# Patient Record
Sex: FEMALE | Race: WHITE | NOT HISPANIC OR LATINO | Employment: OTHER | URBAN - NONMETROPOLITAN AREA
[De-identification: names, ages, dates, MRNs, and addresses within clinical notes are randomized per-mention and may not be internally consistent; named-entity substitution may affect disease eponyms.]

---

## 2022-02-14 ENCOUNTER — TRANSFERRED RECORDS (OUTPATIENT)
Dept: HEALTH INFORMATION MANAGEMENT | Facility: HOSPITAL | Age: 77
End: 2022-02-14

## 2022-02-14 LAB — ALBUMIN/CREATININE RATIO: 18.1 UG/MG (ref 0–29.9)

## 2023-09-26 ENCOUNTER — OFFICE VISIT (OUTPATIENT)
Dept: FAMILY MEDICINE | Facility: OTHER | Age: 78
End: 2023-09-26
Attending: FAMILY MEDICINE
Payer: COMMERCIAL

## 2023-09-26 VITALS
TEMPERATURE: 98.2 F | HEART RATE: 85 BPM | WEIGHT: 174.06 LBS | SYSTOLIC BLOOD PRESSURE: 91 MMHG | OXYGEN SATURATION: 97 % | DIASTOLIC BLOOD PRESSURE: 54 MMHG

## 2023-09-26 DIAGNOSIS — N18.31 STAGE 3A CHRONIC KIDNEY DISEASE (H): ICD-10-CM

## 2023-09-26 DIAGNOSIS — R46.89 COGNITIVE AND BEHAVIORAL CHANGES: ICD-10-CM

## 2023-09-26 DIAGNOSIS — M10.9 GOUT OF MULTIPLE SITES, UNSPECIFIED CAUSE, UNSPECIFIED CHRONICITY: ICD-10-CM

## 2023-09-26 DIAGNOSIS — Z76.89 ESTABLISHING CARE WITH NEW DOCTOR, ENCOUNTER FOR: Primary | ICD-10-CM

## 2023-09-26 DIAGNOSIS — M51.369 DDD (DEGENERATIVE DISC DISEASE), LUMBAR: ICD-10-CM

## 2023-09-26 DIAGNOSIS — Z12.31 ENCOUNTER FOR SCREENING MAMMOGRAM FOR BREAST CANCER: ICD-10-CM

## 2023-09-26 DIAGNOSIS — M17.0 PRIMARY OSTEOARTHRITIS OF BOTH KNEES: ICD-10-CM

## 2023-09-26 DIAGNOSIS — E78.2 MIXED HYPERLIPIDEMIA: ICD-10-CM

## 2023-09-26 DIAGNOSIS — N76.4 VULVAR ABSCESS: ICD-10-CM

## 2023-09-26 DIAGNOSIS — R41.89 COGNITIVE AND BEHAVIORAL CHANGES: ICD-10-CM

## 2023-09-26 DIAGNOSIS — I50.22 CHRONIC SYSTOLIC CONGESTIVE HEART FAILURE (H): ICD-10-CM

## 2023-09-26 LAB
ALBUMIN SERPL BCG-MCNC: 4.3 G/DL (ref 3.5–5.2)
ALP SERPL-CCNC: 110 U/L (ref 35–104)
ALT SERPL W P-5'-P-CCNC: <5 U/L (ref 0–50)
ANION GAP SERPL CALCULATED.3IONS-SCNC: 14 MMOL/L (ref 7–15)
AST SERPL W P-5'-P-CCNC: 14 U/L (ref 0–45)
BASOPHILS # BLD AUTO: 0.1 10E3/UL (ref 0–0.2)
BASOPHILS NFR BLD AUTO: 1 %
BILIRUB SERPL-MCNC: 0.7 MG/DL
BUN SERPL-MCNC: 39.1 MG/DL (ref 8–23)
CALCIUM SERPL-MCNC: 9.4 MG/DL (ref 8.8–10.2)
CHLORIDE SERPL-SCNC: 103 MMOL/L (ref 98–107)
CREAT SERPL-MCNC: 1.38 MG/DL (ref 0.51–0.95)
DEPRECATED HCO3 PLAS-SCNC: 23 MMOL/L (ref 22–29)
EGFRCR SERPLBLD CKD-EPI 2021: 40 ML/MIN/1.73M2
EOSINOPHIL # BLD AUTO: 0.2 10E3/UL (ref 0–0.7)
EOSINOPHIL NFR BLD AUTO: 2 %
ERYTHROCYTE [DISTWIDTH] IN BLOOD BY AUTOMATED COUNT: 13.5 % (ref 10–15)
GLUCOSE SERPL-MCNC: 106 MG/DL (ref 70–99)
HCT VFR BLD AUTO: 36.9 % (ref 35–47)
HGB BLD-MCNC: 11.9 G/DL (ref 11.7–15.7)
IMM GRANULOCYTES # BLD: 0 10E3/UL
IMM GRANULOCYTES NFR BLD: 0 %
LYMPHOCYTES # BLD AUTO: 2.1 10E3/UL (ref 0.8–5.3)
LYMPHOCYTES NFR BLD AUTO: 23 %
MCH RBC QN AUTO: 32.8 PG (ref 26.5–33)
MCHC RBC AUTO-ENTMCNC: 32.2 G/DL (ref 31.5–36.5)
MCV RBC AUTO: 102 FL (ref 78–100)
MONOCYTES # BLD AUTO: 0.7 10E3/UL (ref 0–1.3)
MONOCYTES NFR BLD AUTO: 8 %
NEUTROPHILS # BLD AUTO: 6.3 10E3/UL (ref 1.6–8.3)
NEUTROPHILS NFR BLD AUTO: 66 %
NRBC # BLD AUTO: 0 10E3/UL
NRBC BLD AUTO-RTO: 0 /100
PLATELET # BLD AUTO: 349 10E3/UL (ref 150–450)
POTASSIUM SERPL-SCNC: 4.8 MMOL/L (ref 3.4–5.3)
PROT SERPL-MCNC: 7.6 G/DL (ref 6.4–8.3)
RBC # BLD AUTO: 3.63 10E6/UL (ref 3.8–5.2)
SODIUM SERPL-SCNC: 140 MMOL/L (ref 135–145)
TSH SERPL DL<=0.005 MIU/L-ACNC: 2.49 UIU/ML (ref 0.3–4.2)
WBC # BLD AUTO: 9.4 10E3/UL (ref 4–11)

## 2023-09-26 PROCEDURE — 82746 ASSAY OF FOLIC ACID SERUM: CPT | Mod: ZL | Performed by: FAMILY MEDICINE

## 2023-09-26 PROCEDURE — 82607 VITAMIN B-12: CPT | Mod: ZL | Performed by: FAMILY MEDICINE

## 2023-09-26 PROCEDURE — 2894A PR VOIDCORRECT: CPT | Performed by: FAMILY MEDICINE

## 2023-09-26 PROCEDURE — 90662 IIV NO PRSV INCREASED AG IM: CPT | Performed by: FAMILY MEDICINE

## 2023-09-26 PROCEDURE — 85025 COMPLETE CBC W/AUTO DIFF WBC: CPT | Mod: ZL | Performed by: FAMILY MEDICINE

## 2023-09-26 PROCEDURE — 84443 ASSAY THYROID STIM HORMONE: CPT | Mod: ZL | Performed by: FAMILY MEDICINE

## 2023-09-26 PROCEDURE — G0008 ADMIN INFLUENZA VIRUS VAC: HCPCS | Performed by: FAMILY MEDICINE

## 2023-09-26 PROCEDURE — 80053 COMPREHEN METABOLIC PANEL: CPT | Mod: ZL | Performed by: FAMILY MEDICINE

## 2023-09-26 PROCEDURE — 36415 COLL VENOUS BLD VENIPUNCTURE: CPT | Mod: ZL | Performed by: FAMILY MEDICINE

## 2023-09-26 PROCEDURE — G0463 HOSPITAL OUTPT CLINIC VISIT: HCPCS | Mod: 25 | Performed by: FAMILY MEDICINE

## 2023-09-26 PROCEDURE — 99204 OFFICE O/P NEW MOD 45 MIN: CPT | Mod: 25 | Performed by: FAMILY MEDICINE

## 2023-09-26 RX ORDER — CARVEDILOL 6.25 MG/1
3.12 TABLET ORAL
COMMUNITY
Start: 2023-08-29 | End: 2023-09-26

## 2023-09-26 RX ORDER — COLCHICINE 0.6 MG/1
TABLET ORAL
Qty: 15 TABLET | Refills: 0 | COMMUNITY
Start: 2023-09-26 | End: 2023-09-26

## 2023-09-26 RX ORDER — CARVEDILOL 6.25 MG/1
3.12 TABLET ORAL 2 TIMES DAILY WITH MEALS
Qty: 90 TABLET | Refills: 3 | COMMUNITY
Start: 2023-09-26 | End: 2023-09-26

## 2023-09-26 RX ORDER — FUROSEMIDE 20 MG
20 TABLET ORAL DAILY
Qty: 90 TABLET | Refills: 3 | Status: SHIPPED | OUTPATIENT
Start: 2023-09-26 | End: 2024-06-17

## 2023-09-26 RX ORDER — LISINOPRIL 10 MG/1
10 TABLET ORAL AT BEDTIME
COMMUNITY
Start: 2023-08-29 | End: 2023-09-26

## 2023-09-26 RX ORDER — LISINOPRIL 10 MG/1
10 TABLET ORAL DAILY
Qty: 90 TABLET | Refills: 3 | Status: ON HOLD | OUTPATIENT
Start: 2023-09-26 | End: 2023-10-04

## 2023-09-26 RX ORDER — PREDNISONE 10 MG/1
TABLET ORAL
Qty: 28 TABLET | Refills: 0 | COMMUNITY
Start: 2023-09-26 | End: 2023-09-26

## 2023-09-26 RX ORDER — LIDOCAINE 50 MG/G
OINTMENT TOPICAL
COMMUNITY
Start: 2023-08-07 | End: 2023-09-26

## 2023-09-26 RX ORDER — CARVEDILOL 6.25 MG/1
3.12 TABLET ORAL 2 TIMES DAILY WITH MEALS
Qty: 90 TABLET | Refills: 3 | Status: SHIPPED | OUTPATIENT
Start: 2023-09-26 | End: 2024-06-17

## 2023-09-26 RX ORDER — PREDNISONE 10 MG/1
TABLET ORAL
Qty: 28 TABLET | Refills: 1 | Status: SHIPPED | OUTPATIENT
Start: 2023-09-26 | End: 2024-04-17

## 2023-09-26 RX ORDER — FUROSEMIDE 20 MG
20 TABLET ORAL DAILY
COMMUNITY
Start: 2023-06-09 | End: 2023-09-26

## 2023-09-26 RX ORDER — COLCHICINE 0.6 MG/1
TABLET ORAL
Qty: 15 TABLET | Refills: 1 | Status: SHIPPED | OUTPATIENT
Start: 2023-09-26 | End: 2024-05-21

## 2023-09-26 RX ORDER — ACETAMINOPHEN 500 MG
500 TABLET ORAL EVERY 8 HOURS PRN
Status: ON HOLD | COMMUNITY
Start: 2022-09-01 | End: 2023-10-15

## 2023-09-26 RX ORDER — ASPIRIN 81 MG/1
81 TABLET ORAL DAILY
COMMUNITY
Start: 2023-03-03 | End: 2023-10-13

## 2023-09-26 ASSESSMENT — PAIN SCALES - GENERAL: PAINLEVEL: NO PAIN (0)

## 2023-09-26 NOTE — PATIENT INSTRUCTIONS
Results for orders placed or performed in visit on 09/26/23   Comprehensive metabolic panel     Status: Abnormal   Result Value Ref Range    Sodium 140 135 - 145 mmol/L    Potassium 4.8 3.4 - 5.3 mmol/L    Carbon Dioxide (CO2) 23 22 - 29 mmol/L    Anion Gap 14 7 - 15 mmol/L    Urea Nitrogen 39.1 (H) 8.0 - 23.0 mg/dL    Creatinine 1.38 (H) 0.51 - 0.95 mg/dL    GFR Estimate 40 (L) >60 mL/min/1.73m2    Calcium 9.4 8.8 - 10.2 mg/dL    Chloride 103 98 - 107 mmol/L    Glucose 106 (H) 70 - 99 mg/dL    Alkaline Phosphatase 110 (H) 35 - 104 U/L    AST 14 0 - 45 U/L    ALT <5 0 - 50 U/L    Protein Total 7.6 6.4 - 8.3 g/dL    Albumin 4.3 3.5 - 5.2 g/dL    Bilirubin Total 0.7 <=1.2 mg/dL   CBC with platelets and differential     Status: Abnormal   Result Value Ref Range    WBC Count 9.4 4.0 - 11.0 10e3/uL    RBC Count 3.63 (L) 3.80 - 5.20 10e6/uL    Hemoglobin 11.9 11.7 - 15.7 g/dL    Hematocrit 36.9 35.0 - 47.0 %     (H) 78 - 100 fL    MCH 32.8 26.5 - 33.0 pg    MCHC 32.2 31.5 - 36.5 g/dL    RDW 13.5 10.0 - 15.0 %    Platelet Count 349 150 - 450 10e3/uL    % Neutrophils 66 %    % Lymphocytes 23 %    % Monocytes 8 %    % Eosinophils 2 %    % Basophils 1 %    % Immature Granulocytes 0 %    NRBCs per 100 WBC 0 <1 /100    Absolute Neutrophils 6.3 1.6 - 8.3 10e3/uL    Absolute Lymphocytes 2.1 0.8 - 5.3 10e3/uL    Absolute Monocytes 0.7 0.0 - 1.3 10e3/uL    Absolute Eosinophils 0.2 0.0 - 0.7 10e3/uL    Absolute Basophils 0.1 0.0 - 0.2 10e3/uL    Absolute Immature Granulocytes 0.0 <=0.4 10e3/uL    Absolute NRBCs 0.0 10e3/uL   TSH     Status: Normal   Result Value Ref Range    TSH 2.49 0.30 - 4.20 uIU/mL   CBC with Platelets & Differential     Status: Abnormal    Narrative    The following orders were created for panel order CBC with Platelets & Differential.  Procedure                               Abnormality         Status                     ---------                               -----------         ------                      CBC with platelets and d...[[id number]]  Abnormal            Final result                 Please view results for these tests on the individual orders.

## 2023-09-26 NOTE — PROGRESS NOTES
Assessment & Plan     Establishing care with new doctor, encounter for  Will assume care   - CBC with Platelets & Differential; Future  - Comprehensive metabolic panel; Future  - LABELS; Future  - Folate; Future  - Vitamin B12; Future  - LABELS  - CBC with Platelets & Differential  - Comprehensive metabolic panel  - Folate  - Vitamin B12    Vulvar abscess  Resolved. No s/s of malignancy that can find in her chart   - CBC with Platelets & Differential; Future  - Comprehensive metabolic panel; Future  - LABELS; Future  - Folate; Future  - Vitamin B12; Future  - LABELS  - CBC with Platelets & Differential  - Comprehensive metabolic panel  - Folate  - Vitamin B12    Cognitive and behavioral changes  Mild changes per dtr.  Some hoarding and some different behaviors new to her-- keeping lots of cash on her etc.  Labs ordered and will monitor.   - CBC with Platelets & Differential; Future  - Comprehensive metabolic panel; Future  - LABELS; Future  - Folate; Future  - Vitamin B12; Future  - LABELS  - CBC with Platelets & Differential  - Comprehensive metabolic panel  - Folate  - Vitamin B12  - TSH  - furosemide (LASIX) 20 MG tablet; Take 1 tablet (20 mg) by mouth daily  - lisinopril (ZESTRIL) 10 MG tablet; Take 1 tablet (10 mg) by mouth daily  - carvedilol (COREG) 6.25 MG tablet; Take 0.5 tablets (3.125 mg) by mouth 2 times daily (with meals)    Chronic systolic congestive heart failure (H)  Well compensated with meds - Pt has been instructed on behavioral changes to improve CHF including fluid and salt restrictions along with watching weights and elevating legs. Pt is aware of when to come back or call  if signs and symptoms of CHF worsen.    Stage 3a chronic kidney disease (H)  Stable - will watch     Gout of multiple sites, unspecified cause, unspecified chronicity  Rare - PRN meds given   - predniSONE (DELTASONE) 10 MG tablet; 4 tabs orally for 4 days then 2 tabs orally for 4 days then 1 tablet orally for 4 days  -  colchicine (COLCRYS) 0.6 MG tablet; 2 tablets at onset of gout and may repeat in 1 hour to improve gout at onset and may take 1 tablet daily for 5 more days if gout persists    Mixed hyperlipidemia  Stable in past. Recheck in future     Primary osteoarthritis of both knees  Needs handicap sticker for this and her CHF- done     DDD (degenerative disc disease), lumbar  As aboe. Stable     Refusal of blood transfusions as patient is Mu-ism  Will honor     Encounter for screening mammogram for breast cancer  Due - ordered mammogram  - MA Screening Digital Bilateral; Future      Flu shot given     Review of external notes as documented elsewhere in note  Assessment requiring an independent historian(s) - family - dtr   Diagnosis or treatment significantly limited by social determinants of health - complex hx/ new pt / updated chart via looking at Care Every[facility]  Ordering of each unique test  Prescription drug management  45 minutes spent by me on the date of the encounter doing chart review, history and exam, documentation and further activities per the note           No follow-ups on file.    Nikita Hutchinson MD  [facility] - DEVIN Agarwal is a 77 year old, presenting for the following health issues:  Establish Care        9/26/2023    12:50 PM   Additional Questions   Roomed by Aleah Harry   Accompanied by Daughter - Amanda         9/26/2023    12:50 PM   Patient Reported Additional Medications   Patient reports taking the following new medications None       HPI     **Patient is wanting flu shot, and needs Disability parking form filled out     Establish Care -- pt moved from [place] with dtr to live with r here  [facility] reviewed  Just had Vulvar abscess drained    Hypertension Follow-up    Do you check your blood pressure regularly outside of the clinic? Yes   Are you following a low salt diet? Yes  Are your blood pressures ever more than 140 on the top  number (systolic) OR more   than 90 on the bottom number (diastolic), for example 140/90? No    Heart Failure Follow-up  Are you experiencing any shortness of breath? No  Are you experiencing any swelling in your legs or feet?  Stable  Are you using more pillows than usual? No  Do you cough at night?  No  Do you check your weight daily?  No  Have you had a weight change recently?  No  Are you having any of the following side effects from your medications? (Select all that apply)    Since your last visit, how many times have you gone to the cardiologist, urgent care, emergency room, or hospital because of your heart failure?     Last Echo: No results found. CARE EVERY WHERE EF around 40%      Chronic Kidney Disease Follow-up    Do you take any over the counter pain medicine?: No      Review of Systems   Constitutional, HEENT, cardiovascular, pulmonary, GI, , musculoskeletal, neuro, skin, endocrine and psych systems are negative, except as otherwise noted.      Objective    BP 91/54 (BP Location: Right arm, Patient Position: Sitting, Cuff Size: Adult Regular)   Pulse 85   Temp 98.2  F (36.8  C) (Tympanic)   Wt 79 kg (174 lb 1 oz)   SpO2 97%   There is no height or weight on file to calculate BMI.  Physical Exam   GENERAL: healthy, alert and no distress  EYES: Eyes grossly normal to inspection, PERRL and conjunctivae and sclerae normal  HENT: ear canals and TM's normal, nose and mouth without ulcers or lesions  NECK: no adenopathy, no asymmetry, masses, or scars and thyroid normal to palpation  RESP: lungs clear to auscultation - no rales, rhonchi or wheezes  BREAST: normal without masses, tenderness or nipple discharge and no palpable axillary masses or adenopathy  CV: regular rate and rhythm, normal S1 S2, no S3 or S4, no murmur, click or rub, no peripheral edema and peripheral pulses strong  ABDOMEN: soft, nontender, no hepatosplenomegaly, no masses and bowel sounds normal   (female): normal female external  genitalia, normal urethral meatus, vaginal mucosa pink,hemorrhoidal extra skin noted   MS: no gross musculoskeletal defects noted, plus 1 edema  SKIN: no suspicious lesions or rashes  NEURO: Normal strength and tone, mentation intact and speech normal  PSYCH: mentation appears normal, affect normal/bright        Results for orders placed or performed in visit on 09/26/23   Comprehensive metabolic panel     Status: Abnormal   Result Value Ref Range    Sodium 140 135 - 145 mmol/L    Potassium 4.8 3.4 - 5.3 mmol/L    Carbon Dioxide (CO2) 23 22 - 29 mmol/L    Anion Gap 14 7 - 15 mmol/L    Urea Nitrogen 39.1 (H) 8.0 - 23.0 mg/dL    Creatinine 1.38 (H) 0.51 - 0.95 mg/dL    GFR Estimate 40 (L) >60 mL/min/1.73m2    Calcium 9.4 8.8 - 10.2 mg/dL    Chloride 103 98 - 107 mmol/L    Glucose 106 (H) 70 - 99 mg/dL    Alkaline Phosphatase 110 (H) 35 - 104 U/L    AST 14 0 - 45 U/L    ALT <5 0 - 50 U/L    Protein Total 7.6 6.4 - 8.3 g/dL    Albumin 4.3 3.5 - 5.2 g/dL    Bilirubin Total 0.7 <=1.2 mg/dL   CBC with platelets and differential     Status: Abnormal   Result Value Ref Range    WBC Count 9.4 4.0 - 11.0 10e3/uL    RBC Count 3.63 (L) 3.80 - 5.20 10e6/uL    Hemoglobin 11.9 11.7 - 15.7 g/dL    Hematocrit 36.9 35.0 - 47.0 %     (H) 78 - 100 fL    MCH 32.8 26.5 - 33.0 pg    MCHC 32.2 31.5 - 36.5 g/dL    RDW 13.5 10.0 - 15.0 %    Platelet Count 349 150 - 450 10e3/uL    % Neutrophils 66 %    % Lymphocytes 23 %    % Monocytes 8 %    % Eosinophils 2 %    % Basophils 1 %    % Immature Granulocytes 0 %    NRBCs per 100 WBC 0 <1 /100    Absolute Neutrophils 6.3 1.6 - 8.3 10e3/uL    Absolute Lymphocytes 2.1 0.8 - 5.3 10e3/uL    Absolute Monocytes 0.7 0.0 - 1.3 10e3/uL    Absolute Eosinophils 0.2 0.0 - 0.7 10e3/uL    Absolute Basophils 0.1 0.0 - 0.2 10e3/uL    Absolute Immature Granulocytes 0.0 <=0.4 10e3/uL    Absolute NRBCs 0.0 10e3/uL   TSH     Status: Normal   Result Value Ref Range    TSH 2.49 0.30 - 4.20 uIU/mL   CBC with  Platelets & Differential     Status: Abnormal    Narrative    The following orders were created for panel order CBC with Platelets & Differential.  Procedure                               Abnormality         Status                     ---------                               -----------         ------                     CBC with platelets and d...[[id number]]  Abnormal            Final result                 Please view results for these tests on the individual orders.

## 2023-09-27 LAB
FOLATE SERPL-MCNC: 7.1 NG/ML (ref 4.6–34.8)
VIT B12 SERPL-MCNC: 352 PG/ML (ref 232–1245)

## 2023-10-02 ENCOUNTER — TELEPHONE (OUTPATIENT)
Dept: FAMILY MEDICINE | Facility: OTHER | Age: 78
End: 2023-10-02

## 2023-10-02 ENCOUNTER — ANESTHESIA (OUTPATIENT)
Dept: MEDSURG UNIT | Facility: HOSPITAL | Age: 78
DRG: 854 | End: 2023-10-02
Payer: MEDICARE

## 2023-10-02 ENCOUNTER — HOSPITAL ENCOUNTER (INPATIENT)
Facility: HOSPITAL | Age: 78
LOS: 3 days | Discharge: HOME | DRG: 854 | End: 2023-10-05
Attending: NURSE PRACTITIONER | Admitting: SURGERY
Payer: MEDICARE

## 2023-10-02 ENCOUNTER — ANESTHESIA EVENT (OUTPATIENT)
Dept: MEDSURG UNIT | Facility: HOSPITAL | Age: 78
DRG: 854 | End: 2023-10-02
Payer: MEDICARE

## 2023-10-02 ENCOUNTER — APPOINTMENT (OUTPATIENT)
Dept: CT IMAGING | Facility: HOSPITAL | Age: 78
DRG: 854 | End: 2023-10-02
Attending: NURSE PRACTITIONER
Payer: MEDICARE

## 2023-10-02 ENCOUNTER — ANESTHESIA EVENT (OUTPATIENT)
Dept: SURGERY | Facility: HOSPITAL | Age: 78
DRG: 854 | End: 2023-10-02
Payer: MEDICARE

## 2023-10-02 ENCOUNTER — ANESTHESIA (OUTPATIENT)
Dept: SURGERY | Facility: HOSPITAL | Age: 78
DRG: 854 | End: 2023-10-02
Payer: MEDICARE

## 2023-10-02 DIAGNOSIS — R46.89 COGNITIVE AND BEHAVIORAL CHANGES: ICD-10-CM

## 2023-10-02 DIAGNOSIS — K61.1 PERIRECTAL ABSCESS: Primary | ICD-10-CM

## 2023-10-02 DIAGNOSIS — N18.31 STAGE 3A CHRONIC KIDNEY DISEASE (H): ICD-10-CM

## 2023-10-02 DIAGNOSIS — K61.0 PERIANAL ABSCESS: ICD-10-CM

## 2023-10-02 DIAGNOSIS — R41.89 COGNITIVE AND BEHAVIORAL CHANGES: ICD-10-CM

## 2023-10-02 PROBLEM — I50.1 LEFT HEART FAILURE (H): Chronic | Status: ACTIVE | Noted: 2019-01-04

## 2023-10-02 LAB
ALBUMIN UR-MCNC: NEGATIVE MG/DL
ANION GAP SERPL CALCULATED.3IONS-SCNC: 12 MMOL/L (ref 7–15)
APPEARANCE UR: CLEAR
BACTERIA #/AREA URNS HPF: ABNORMAL /HPF
BASO+EOS+MONOS # BLD AUTO: ABNORMAL 10*3/UL
BASO+EOS+MONOS NFR BLD AUTO: ABNORMAL %
BASOPHILS # BLD AUTO: 0.1 10E3/UL (ref 0–0.2)
BASOPHILS NFR BLD AUTO: 0 %
BILIRUB UR QL STRIP: NEGATIVE
BUN SERPL-MCNC: 38.1 MG/DL (ref 8–23)
CALCIUM SERPL-MCNC: 8.8 MG/DL (ref 8.8–10.2)
CHLORIDE SERPL-SCNC: 104 MMOL/L (ref 98–107)
COLOR UR AUTO: ABNORMAL
CREAT SERPL-MCNC: 1.6 MG/DL (ref 0.51–0.95)
DEPRECATED HCO3 PLAS-SCNC: 23 MMOL/L (ref 22–29)
EGFRCR SERPLBLD CKD-EPI 2021: 33 ML/MIN/1.73M2
EOSINOPHIL # BLD AUTO: 0.1 10E3/UL (ref 0–0.7)
EOSINOPHIL NFR BLD AUTO: 0 %
ERYTHROCYTE [DISTWIDTH] IN BLOOD BY AUTOMATED COUNT: 13.2 % (ref 10–15)
ERYTHROCYTE [DISTWIDTH] IN BLOOD BY AUTOMATED COUNT: 13.2 % (ref 10–15)
GLUCOSE SERPL-MCNC: 110 MG/DL (ref 70–99)
GLUCOSE UR STRIP-MCNC: NEGATIVE MG/DL
HCT VFR BLD AUTO: 33.1 % (ref 35–47)
HCT VFR BLD AUTO: 33.2 % (ref 35–47)
HGB BLD-MCNC: 10.7 G/DL (ref 11.7–15.7)
HGB BLD-MCNC: 10.8 G/DL (ref 11.7–15.7)
HGB UR QL STRIP: NEGATIVE
HOLD SPECIMEN: NORMAL
HYALINE CASTS: 6 /LPF
IMM GRANULOCYTES # BLD: 0.1 10E3/UL
IMM GRANULOCYTES NFR BLD: 1 %
KETONES UR STRIP-MCNC: NEGATIVE MG/DL
LACTATE SERPL-SCNC: 1 MMOL/L (ref 0.7–2)
LEUKOCYTE ESTERASE UR QL STRIP: NEGATIVE
LYMPHOCYTES # BLD AUTO: 1.5 10E3/UL (ref 0.8–5.3)
LYMPHOCYTES NFR BLD AUTO: 7 %
MAGNESIUM SERPL-MCNC: 2 MG/DL (ref 1.7–2.3)
MCH RBC QN AUTO: 32.4 PG (ref 26.5–33)
MCH RBC QN AUTO: 32.9 PG (ref 26.5–33)
MCHC RBC AUTO-ENTMCNC: 32.3 G/DL (ref 31.5–36.5)
MCHC RBC AUTO-ENTMCNC: 32.5 G/DL (ref 31.5–36.5)
MCV RBC AUTO: 100 FL (ref 78–100)
MCV RBC AUTO: 101 FL (ref 78–100)
MONOCYTES # BLD AUTO: 1.3 10E3/UL (ref 0–1.3)
MONOCYTES NFR BLD AUTO: 6 %
MUCOUS THREADS #/AREA URNS LPF: PRESENT /LPF
NEUTROPHILS # BLD AUTO: 17.6 10E3/UL (ref 1.6–8.3)
NEUTROPHILS NFR BLD AUTO: 86 %
NITRATE UR QL: NEGATIVE
NRBC # BLD AUTO: 0 10E3/UL
NRBC BLD AUTO-RTO: 0 /100
NT-PROBNP SERPL-MCNC: 5092 PG/ML (ref 0–900)
PH UR STRIP: 5 [PH] (ref 4.7–8)
PLATELET # BLD AUTO: 268 10E3/UL (ref 150–450)
PLATELET # BLD AUTO: 305 10E3/UL (ref 150–450)
POTASSIUM SERPL-SCNC: 4.3 MMOL/L (ref 3.4–5.3)
PROCALCITONIN SERPL IA-MCNC: 0.31 NG/ML
RBC # BLD AUTO: 3.28 10E6/UL (ref 3.8–5.2)
RBC # BLD AUTO: 3.3 10E6/UL (ref 3.8–5.2)
RBC URINE: 1 /HPF
SODIUM SERPL-SCNC: 139 MMOL/L (ref 135–145)
SP GR UR STRIP: 1.01 (ref 1–1.03)
SQUAMOUS EPITHELIAL: 1 /HPF
TSH SERPL DL<=0.005 MIU/L-ACNC: 1.15 UIU/ML (ref 0.3–4.2)
UROBILINOGEN UR STRIP-MCNC: NORMAL MG/DL
WBC # BLD AUTO: 20.7 10E3/UL (ref 4–11)
WBC # BLD AUTO: 21.7 10E3/UL (ref 4–11)
WBC URINE: <1 /HPF

## 2023-10-02 PROCEDURE — 96374 THER/PROPH/DIAG INJ IV PUSH: CPT

## 2023-10-02 PROCEDURE — 999N000157 HC STATISTIC RCP TIME EA 10 MIN

## 2023-10-02 PROCEDURE — 360N000075 HC SURGERY LEVEL 2, PER MIN: Performed by: SURGERY

## 2023-10-02 PROCEDURE — 710N000010 HC RECOVERY PHASE 1, LEVEL 2, PER MIN: Performed by: SURGERY

## 2023-10-02 PROCEDURE — 74177 CT ABD & PELVIS W/CONTRAST: CPT

## 2023-10-02 PROCEDURE — 99140 ANES COMP EMERGENCY COND: CPT | Performed by: NURSE ANESTHETIST, CERTIFIED REGISTERED

## 2023-10-02 PROCEDURE — 250N000011 HC RX IP 250 OP 636: Performed by: NURSE PRACTITIONER

## 2023-10-02 PROCEDURE — 87077 CULTURE AEROBIC IDENTIFY: CPT | Performed by: SURGERY

## 2023-10-02 PROCEDURE — 83735 ASSAY OF MAGNESIUM: CPT | Performed by: INTERNAL MEDICINE

## 2023-10-02 PROCEDURE — 250N000013 HC RX MED GY IP 250 OP 250 PS 637: Performed by: SURGERY

## 2023-10-02 PROCEDURE — 258N000003 HC RX IP 258 OP 636: Performed by: NURSE PRACTITIONER

## 2023-10-02 PROCEDURE — 87075 CULTR BACTERIA EXCEPT BLOOD: CPT | Performed by: SURGERY

## 2023-10-02 PROCEDURE — 84443 ASSAY THYROID STIM HORMONE: CPT | Performed by: INTERNAL MEDICINE

## 2023-10-02 PROCEDURE — 36415 COLL VENOUS BLD VENIPUNCTURE: CPT | Performed by: NURSE PRACTITIONER

## 2023-10-02 PROCEDURE — 80048 BASIC METABOLIC PNL TOTAL CA: CPT | Performed by: NURSE PRACTITIONER

## 2023-10-02 PROCEDURE — 83880 ASSAY OF NATRIURETIC PEPTIDE: CPT | Performed by: INTERNAL MEDICINE

## 2023-10-02 PROCEDURE — 83605 ASSAY OF LACTIC ACID: CPT | Performed by: NURSE PRACTITIONER

## 2023-10-02 PROCEDURE — 46050 I&D PERIANAL ABSCESS SUPFC: CPT | Performed by: NURSE ANESTHETIST, CERTIFIED REGISTERED

## 2023-10-02 PROCEDURE — 0W9N0ZZ DRAINAGE OF FEMALE PERINEUM, OPEN APPROACH: ICD-10-PCS | Performed by: SURGERY

## 2023-10-02 PROCEDURE — 370N000017 HC ANESTHESIA TECHNICAL FEE, PER MIN: Performed by: SURGERY

## 2023-10-02 PROCEDURE — 87205 SMEAR GRAM STAIN: CPT | Performed by: SURGERY

## 2023-10-02 PROCEDURE — 46050 I&D PERIANAL ABSCESS SUPFC: CPT | Performed by: SURGERY

## 2023-10-02 PROCEDURE — 272N000001 HC OR GENERAL SUPPLY STERILE: Performed by: SURGERY

## 2023-10-02 PROCEDURE — 250N000009 HC RX 250: Performed by: NURSE ANESTHETIST, CERTIFIED REGISTERED

## 2023-10-02 PROCEDURE — 99285 EMERGENCY DEPT VISIT HI MDM: CPT | Mod: 25

## 2023-10-02 PROCEDURE — 258N000003 HC RX IP 258 OP 636: Performed by: SURGERY

## 2023-10-02 PROCEDURE — 85027 COMPLETE CBC AUTOMATED: CPT | Performed by: INTERNAL MEDICINE

## 2023-10-02 PROCEDURE — 99100 ANES PT EXTEME AGE<1 YR&>70: CPT | Performed by: NURSE ANESTHETIST, CERTIFIED REGISTERED

## 2023-10-02 PROCEDURE — 258N000003 HC RX IP 258 OP 636: Performed by: NURSE ANESTHETIST, CERTIFIED REGISTERED

## 2023-10-02 PROCEDURE — 99285 EMERGENCY DEPT VISIT HI MDM: CPT | Performed by: NURSE PRACTITIONER

## 2023-10-02 PROCEDURE — 36415 COLL VENOUS BLD VENIPUNCTURE: CPT | Performed by: INTERNAL MEDICINE

## 2023-10-02 PROCEDURE — 81001 URINALYSIS AUTO W/SCOPE: CPT | Performed by: NURSE PRACTITIONER

## 2023-10-02 PROCEDURE — 85025 COMPLETE CBC W/AUTO DIFF WBC: CPT | Performed by: NURSE PRACTITIONER

## 2023-10-02 PROCEDURE — 250N000011 HC RX IP 250 OP 636: Performed by: STUDENT IN AN ORGANIZED HEALTH CARE EDUCATION/TRAINING PROGRAM

## 2023-10-02 PROCEDURE — 250N000011 HC RX IP 250 OP 636: Mod: JZ | Performed by: NURSE ANESTHETIST, CERTIFIED REGISTERED

## 2023-10-02 PROCEDURE — 87040 BLOOD CULTURE FOR BACTERIA: CPT | Performed by: NURSE PRACTITIONER

## 2023-10-02 PROCEDURE — 120N000001 HC R&B MED SURG/OB

## 2023-10-02 PROCEDURE — 84145 PROCALCITONIN (PCT): CPT | Performed by: NURSE PRACTITIONER

## 2023-10-02 RX ORDER — FENTANYL CITRATE 50 UG/ML
INJECTION, SOLUTION INTRAMUSCULAR; INTRAVENOUS PRN
Status: DISCONTINUED | OUTPATIENT
Start: 2023-10-02 | End: 2023-10-02

## 2023-10-02 RX ORDER — HYDROMORPHONE HCL IN WATER/PF 6 MG/30 ML
0.4 PATIENT CONTROLLED ANALGESIA SYRINGE INTRAVENOUS
Status: DISCONTINUED | OUTPATIENT
Start: 2023-10-02 | End: 2023-10-05 | Stop reason: HOSPADM

## 2023-10-02 RX ORDER — HYDRALAZINE HYDROCHLORIDE 20 MG/ML
2.5-5 INJECTION INTRAMUSCULAR; INTRAVENOUS EVERY 10 MIN PRN
Status: DISCONTINUED | OUTPATIENT
Start: 2023-10-02 | End: 2023-10-02

## 2023-10-02 RX ORDER — LIDOCAINE 40 MG/G
CREAM TOPICAL
Status: DISCONTINUED | OUTPATIENT
Start: 2023-10-02 | End: 2023-10-05 | Stop reason: HOSPADM

## 2023-10-02 RX ORDER — ONDANSETRON 2 MG/ML
4 INJECTION INTRAMUSCULAR; INTRAVENOUS EVERY 30 MIN PRN
Status: DISCONTINUED | OUTPATIENT
Start: 2023-10-02 | End: 2023-10-02 | Stop reason: HOSPADM

## 2023-10-02 RX ORDER — FUROSEMIDE 20 MG
20 TABLET ORAL DAILY
Status: DISCONTINUED | OUTPATIENT
Start: 2023-10-02 | End: 2023-10-02

## 2023-10-02 RX ORDER — HYDROMORPHONE HCL IN WATER/PF 6 MG/30 ML
0.2 PATIENT CONTROLLED ANALGESIA SYRINGE INTRAVENOUS
Status: DISCONTINUED | OUTPATIENT
Start: 2023-10-02 | End: 2023-10-05 | Stop reason: HOSPADM

## 2023-10-02 RX ORDER — LABETALOL 20 MG/4 ML (5 MG/ML) INTRAVENOUS SYRINGE
10
Status: DISCONTINUED | OUTPATIENT
Start: 2023-10-02 | End: 2023-10-02 | Stop reason: HOSPADM

## 2023-10-02 RX ORDER — SODIUM CHLORIDE, SODIUM LACTATE, POTASSIUM CHLORIDE, CALCIUM CHLORIDE 600; 310; 30; 20 MG/100ML; MG/100ML; MG/100ML; MG/100ML
INJECTION, SOLUTION INTRAVENOUS CONTINUOUS
Status: DISCONTINUED | OUTPATIENT
Start: 2023-10-02 | End: 2023-10-02 | Stop reason: HOSPADM

## 2023-10-02 RX ORDER — LIDOCAINE HYDROCHLORIDE 20 MG/ML
INJECTION, SOLUTION INFILTRATION; PERINEURAL PRN
Status: DISCONTINUED | OUTPATIENT
Start: 2023-10-02 | End: 2023-10-02

## 2023-10-02 RX ORDER — SODIUM CHLORIDE, SODIUM LACTATE, POTASSIUM CHLORIDE, CALCIUM CHLORIDE 600; 310; 30; 20 MG/100ML; MG/100ML; MG/100ML; MG/100ML
INJECTION, SOLUTION INTRAVENOUS CONTINUOUS
Status: DISCONTINUED | OUTPATIENT
Start: 2023-10-02 | End: 2023-10-02

## 2023-10-02 RX ORDER — PROCHLORPERAZINE MALEATE 5 MG
5 TABLET ORAL EVERY 6 HOURS PRN
Status: DISCONTINUED | OUTPATIENT
Start: 2023-10-02 | End: 2023-10-05 | Stop reason: HOSPADM

## 2023-10-02 RX ORDER — ONDANSETRON 2 MG/ML
INJECTION INTRAMUSCULAR; INTRAVENOUS PRN
Status: DISCONTINUED | OUTPATIENT
Start: 2023-10-02 | End: 2023-10-02

## 2023-10-02 RX ORDER — METRONIDAZOLE 500 MG/100ML
500 INJECTION, SOLUTION INTRAVENOUS EVERY 12 HOURS
Status: DISCONTINUED | OUTPATIENT
Start: 2023-10-03 | End: 2023-10-04

## 2023-10-02 RX ORDER — METRONIDAZOLE 500 MG/100ML
500 INJECTION, SOLUTION INTRAVENOUS ONCE
Status: COMPLETED | OUTPATIENT
Start: 2023-10-02 | End: 2023-10-02

## 2023-10-02 RX ORDER — CIPROFLOXACIN 2 MG/ML
200 INJECTION, SOLUTION INTRAVENOUS ONCE
Status: COMPLETED | OUTPATIENT
Start: 2023-10-02 | End: 2023-10-02

## 2023-10-02 RX ORDER — OXYCODONE HYDROCHLORIDE 5 MG/1
10 TABLET ORAL EVERY 4 HOURS PRN
Status: DISCONTINUED | OUTPATIENT
Start: 2023-10-02 | End: 2023-10-05 | Stop reason: HOSPADM

## 2023-10-02 RX ORDER — OXYCODONE HYDROCHLORIDE 5 MG/1
5 TABLET ORAL EVERY 4 HOURS PRN
Status: DISCONTINUED | OUTPATIENT
Start: 2023-10-02 | End: 2023-10-05 | Stop reason: HOSPADM

## 2023-10-02 RX ORDER — ONDANSETRON 4 MG/1
4 TABLET, ORALLY DISINTEGRATING ORAL EVERY 30 MIN PRN
Status: DISCONTINUED | OUTPATIENT
Start: 2023-10-02 | End: 2023-10-02 | Stop reason: HOSPADM

## 2023-10-02 RX ORDER — ONDANSETRON 4 MG/1
4 TABLET, ORALLY DISINTEGRATING ORAL EVERY 30 MIN PRN
Status: DISCONTINUED | OUTPATIENT
Start: 2023-10-02 | End: 2023-10-02

## 2023-10-02 RX ORDER — LABETALOL 20 MG/4 ML (5 MG/ML) INTRAVENOUS SYRINGE
10
Status: DISCONTINUED | OUTPATIENT
Start: 2023-10-02 | End: 2023-10-02

## 2023-10-02 RX ORDER — ALBUTEROL SULFATE 0.83 MG/ML
2.5 SOLUTION RESPIRATORY (INHALATION) EVERY 4 HOURS PRN
Status: DISCONTINUED | OUTPATIENT
Start: 2023-10-02 | End: 2023-10-02 | Stop reason: HOSPADM

## 2023-10-02 RX ORDER — PROPOFOL 10 MG/ML
INJECTION, EMULSION INTRAVENOUS CONTINUOUS PRN
Status: DISCONTINUED | OUTPATIENT
Start: 2023-10-02 | End: 2023-10-02

## 2023-10-02 RX ORDER — KETAMINE HYDROCHLORIDE 10 MG/ML
INJECTION INTRAMUSCULAR; INTRAVENOUS PRN
Status: DISCONTINUED | OUTPATIENT
Start: 2023-10-02 | End: 2023-10-02

## 2023-10-02 RX ORDER — NALOXONE HYDROCHLORIDE 0.4 MG/ML
0.2 INJECTION, SOLUTION INTRAMUSCULAR; INTRAVENOUS; SUBCUTANEOUS
Status: DISCONTINUED | OUTPATIENT
Start: 2023-10-02 | End: 2023-10-05 | Stop reason: HOSPADM

## 2023-10-02 RX ORDER — LIDOCAINE 40 MG/G
CREAM TOPICAL
Status: DISCONTINUED | OUTPATIENT
Start: 2023-10-02 | End: 2023-10-02 | Stop reason: HOSPADM

## 2023-10-02 RX ORDER — NALOXONE HYDROCHLORIDE 0.4 MG/ML
0.4 INJECTION, SOLUTION INTRAMUSCULAR; INTRAVENOUS; SUBCUTANEOUS
Status: DISCONTINUED | OUTPATIENT
Start: 2023-10-02 | End: 2023-10-05 | Stop reason: HOSPADM

## 2023-10-02 RX ORDER — LISINOPRIL 10 MG/1
10 TABLET ORAL DAILY
Status: DISCONTINUED | OUTPATIENT
Start: 2023-10-02 | End: 2023-10-02

## 2023-10-02 RX ORDER — IOPAMIDOL 755 MG/ML
86 INJECTION, SOLUTION INTRAVASCULAR ONCE
Status: COMPLETED | OUTPATIENT
Start: 2023-10-02 | End: 2023-10-02

## 2023-10-02 RX ORDER — ONDANSETRON 2 MG/ML
4 INJECTION INTRAMUSCULAR; INTRAVENOUS EVERY 30 MIN PRN
Status: DISCONTINUED | OUTPATIENT
Start: 2023-10-02 | End: 2023-10-02

## 2023-10-02 RX ORDER — CARVEDILOL 3.12 MG/1
3.12 TABLET ORAL 2 TIMES DAILY WITH MEALS
Status: DISCONTINUED | OUTPATIENT
Start: 2023-10-02 | End: 2023-10-05 | Stop reason: HOSPADM

## 2023-10-02 RX ORDER — FENTANYL CITRATE 50 UG/ML
50 INJECTION, SOLUTION INTRAMUSCULAR; INTRAVENOUS EVERY 5 MIN PRN
Status: DISCONTINUED | OUTPATIENT
Start: 2023-10-02 | End: 2023-10-02

## 2023-10-02 RX ORDER — SODIUM CHLORIDE, SODIUM LACTATE, POTASSIUM CHLORIDE, CALCIUM CHLORIDE 600; 310; 30; 20 MG/100ML; MG/100ML; MG/100ML; MG/100ML
INJECTION, SOLUTION INTRAVENOUS CONTINUOUS PRN
Status: DISCONTINUED | OUTPATIENT
Start: 2023-10-02 | End: 2023-10-02

## 2023-10-02 RX ORDER — PROPOFOL 10 MG/ML
INJECTION, EMULSION INTRAVENOUS PRN
Status: DISCONTINUED | OUTPATIENT
Start: 2023-10-02 | End: 2023-10-02

## 2023-10-02 RX ORDER — COLCHICINE 0.6 MG/1
0.6 TABLET ORAL DAILY
Status: DISCONTINUED | OUTPATIENT
Start: 2023-10-02 | End: 2023-10-02

## 2023-10-02 RX ORDER — SODIUM CHLORIDE, SODIUM LACTATE, POTASSIUM CHLORIDE, CALCIUM CHLORIDE 600; 310; 30; 20 MG/100ML; MG/100ML; MG/100ML; MG/100ML
INJECTION, SOLUTION INTRAVENOUS CONTINUOUS
Status: DISCONTINUED | OUTPATIENT
Start: 2023-10-02 | End: 2023-10-03

## 2023-10-02 RX ORDER — HYDROMORPHONE HYDROCHLORIDE 1 MG/ML
0.5 INJECTION, SOLUTION INTRAMUSCULAR; INTRAVENOUS; SUBCUTANEOUS EVERY 5 MIN PRN
Status: DISCONTINUED | OUTPATIENT
Start: 2023-10-02 | End: 2023-10-02

## 2023-10-02 RX ORDER — DEXAMETHASONE SODIUM PHOSPHATE 4 MG/ML
INJECTION, SOLUTION INTRA-ARTICULAR; INTRALESIONAL; INTRAMUSCULAR; INTRAVENOUS; SOFT TISSUE PRN
Status: DISCONTINUED | OUTPATIENT
Start: 2023-10-02 | End: 2023-10-02

## 2023-10-02 RX ORDER — ONDANSETRON 4 MG/1
4 TABLET, ORALLY DISINTEGRATING ORAL EVERY 6 HOURS PRN
Status: DISCONTINUED | OUTPATIENT
Start: 2023-10-02 | End: 2023-10-05 | Stop reason: HOSPADM

## 2023-10-02 RX ORDER — FENTANYL CITRATE 50 UG/ML
50 INJECTION, SOLUTION INTRAMUSCULAR; INTRAVENOUS EVERY 5 MIN PRN
Status: DISCONTINUED | OUTPATIENT
Start: 2023-10-02 | End: 2023-10-02 | Stop reason: HOSPADM

## 2023-10-02 RX ORDER — CIPROFLOXACIN 2 MG/ML
400 INJECTION, SOLUTION INTRAVENOUS EVERY 24 HOURS
Status: DISCONTINUED | OUTPATIENT
Start: 2023-10-03 | End: 2023-10-03

## 2023-10-02 RX ORDER — ONDANSETRON 2 MG/ML
4 INJECTION INTRAMUSCULAR; INTRAVENOUS EVERY 6 HOURS PRN
Status: DISCONTINUED | OUTPATIENT
Start: 2023-10-02 | End: 2023-10-05 | Stop reason: HOSPADM

## 2023-10-02 RX ORDER — HYDRALAZINE HYDROCHLORIDE 20 MG/ML
2.5-5 INJECTION INTRAMUSCULAR; INTRAVENOUS EVERY 10 MIN PRN
Status: DISCONTINUED | OUTPATIENT
Start: 2023-10-02 | End: 2023-10-02 | Stop reason: HOSPADM

## 2023-10-02 RX ORDER — HYDROMORPHONE HYDROCHLORIDE 1 MG/ML
0.5 INJECTION, SOLUTION INTRAMUSCULAR; INTRAVENOUS; SUBCUTANEOUS EVERY 5 MIN PRN
Status: DISCONTINUED | OUTPATIENT
Start: 2023-10-02 | End: 2023-10-02 | Stop reason: HOSPADM

## 2023-10-02 RX ADMIN — PROPOFOL 150 MG: 10 INJECTION, EMULSION INTRAVENOUS at 16:57

## 2023-10-02 RX ADMIN — IOPAMIDOL 86 ML: 755 INJECTION, SOLUTION INTRAVENOUS at 14:10

## 2023-10-02 RX ADMIN — PHENYLEPHRINE HYDROCHLORIDE 100 MCG: 10 INJECTION INTRAVENOUS at 17:20

## 2023-10-02 RX ADMIN — PHENYLEPHRINE HYDROCHLORIDE 50 MCG: 10 INJECTION INTRAVENOUS at 17:17

## 2023-10-02 RX ADMIN — OXYCODONE HYDROCHLORIDE 5 MG: 5 TABLET ORAL at 20:05

## 2023-10-02 RX ADMIN — SODIUM CHLORIDE, POTASSIUM CHLORIDE, SODIUM LACTATE AND CALCIUM CHLORIDE: 600; 310; 30; 20 INJECTION, SOLUTION INTRAVENOUS at 19:47

## 2023-10-02 RX ADMIN — CIPROFLOXACIN 200 MG: 2 INJECTION, SOLUTION INTRAVENOUS at 16:45

## 2023-10-02 RX ADMIN — SODIUM CHLORIDE, POTASSIUM CHLORIDE, SODIUM LACTATE AND CALCIUM CHLORIDE 1000 ML: 600; 310; 30; 20 INJECTION, SOLUTION INTRAVENOUS at 13:31

## 2023-10-02 RX ADMIN — PROPOFOL 175 MCG/KG/MIN: 10 INJECTION, EMULSION INTRAVENOUS at 16:58

## 2023-10-02 RX ADMIN — CARVEDILOL 3.12 MG: 3.12 TABLET, FILM COATED ORAL at 20:05

## 2023-10-02 RX ADMIN — LIDOCAINE HYDROCHLORIDE 40 MG: 20 INJECTION, SOLUTION INFILTRATION; PERINEURAL at 16:57

## 2023-10-02 RX ADMIN — Medication 20 MG: at 16:57

## 2023-10-02 RX ADMIN — ONDANSETRON 4 MG: 2 INJECTION INTRAMUSCULAR; INTRAVENOUS at 16:51

## 2023-10-02 RX ADMIN — SODIUM CHLORIDE, POTASSIUM CHLORIDE, SODIUM LACTATE AND CALCIUM CHLORIDE: 600; 310; 30; 20 INJECTION, SOLUTION INTRAVENOUS at 16:53

## 2023-10-02 RX ADMIN — PHENYLEPHRINE HYDROCHLORIDE 50 MCG: 10 INJECTION INTRAVENOUS at 17:07

## 2023-10-02 RX ADMIN — PHENYLEPHRINE HYDROCHLORIDE 50 MCG: 10 INJECTION INTRAVENOUS at 17:10

## 2023-10-02 RX ADMIN — METRONIDAZOLE 500 MG: 500 INJECTION, SOLUTION INTRAVENOUS at 15:44

## 2023-10-02 RX ADMIN — FENTANYL CITRATE 100 MCG: 50 INJECTION, SOLUTION INTRAMUSCULAR; INTRAVENOUS at 16:50

## 2023-10-02 RX ADMIN — DEXAMETHASONE SODIUM PHOSPHATE 10 MG: 4 INJECTION, SOLUTION INTRA-ARTICULAR; INTRALESIONAL; INTRAMUSCULAR; INTRAVENOUS; SOFT TISSUE at 17:01

## 2023-10-02 RX ADMIN — PHENYLEPHRINE HYDROCHLORIDE 50 MCG: 10 INJECTION INTRAVENOUS at 17:14

## 2023-10-02 ASSESSMENT — ACTIVITIES OF DAILY LIVING (ADL)
ADLS_ACUITY_SCORE: 37
ADLS_ACUITY_SCORE: 41
ADLS_ACUITY_SCORE: 41
ADLS_ACUITY_SCORE: 35

## 2023-10-02 NOTE — OR NURSING
"Patient remains awake/alert and interactive.  Patient notes that her surgical site is \"sore\" but feels much better than earlier today.  Patient continues to be on her right side and supported by a pillow.  Denies any nausea.  Patient's VSS; ready for transfer to floor.  "

## 2023-10-02 NOTE — CONSULTS
"[facility]  Consult Note - Hospitalist Service  Date of Admission:  10/2/2023  Consult Requested by: dr. Francisco W, surgery  Reason for Consult: heart failure management in post op patient.     Assessment & Plan   Any Selby is a 77 year old female admitted on 10/2/2023. She underwent incision and drainage of left perianal abscess.     Hospital problems:   H/o systolic heart failure.   - no recent echo. 31-40% documented in PMH from [facility] (care everywhere 4.26.2022).   - on appropriate medications  - will hold lasix (due to frequent multifocal PVC) until will check lytes, BNP    2. Acute on chronic renal failure  - CKD, stage 3a per history  - cr 1.6 today, last (9.26.23 was 1.38  - hold lasix and lisinopril till AM    3. Frequent PVC  - telemetry  - check K, Mg, TSH, T4    4. ARNEL  - will order pulse oxymetry to monitor overnight    5. Gout  - no flair  - will give colchicine per orders if flair    6. Chronic anemia  - will monitor  - anemia panel    7. Mild hypotension  - hold Lisinopril and lasix till AM    8. Perirectal abscess  - surgery manages      Clinically Significant Risk Factors Present on Admission                # Drug Induced Platelet Defect: home medication list includes an antiplatelet medication   # Hypertension: Home medication list includes antihypertensive(s)      # Obesity: Estimated body mass index is 33.99 kg/m  as calculated from the following:    Height as of this encounter: 1.549 m (5' 1\").    Weight as of this encounter: 81.6 kg (179 lb 14.3 oz).              Sloan Gao MD  Hospitalist Service  Securely message with [employer] (more info)  Text page via [facility] Paging/Directory   ______________________________________________________________________    Chief Complaint   Rectal pain    History is obtained from the patient, electronic health record, and emergency department physician    History of Present Illness   Any Selby is a 77 year old female " who has h/o systolic heart failure (ED 31-40% documented April 2022, on appropriate medications), CKD stage 3a, ARNEL, anemia, vulvar abscess, gout, HTN, degenerative joint dz (post R knee replacement) presents to ED with vulvar pain. Newly established PCP (patient moved to [place] from [place] in September 2023 and established care with dr. Hutchinson on September 26.2023). Abscess suspected. H/o vulvar abscess which was drained in August 2023 when still was living in [place]. Denies fever, chills. Denies chest pain, orthopnea, PND, cough. Admits having difficulty walking due to knee pain, uses pain, urinary incontinence, chronic back pain, nausea, vomitig. Upset that the last few years (especially after covid pneumonia she survived) her health is not as good as it was before. 10 points of ROS obtained. The rest is negative.   ED course and tests results:  Presented hemodynamically stable, but was found hypotensive on few measurements.   Cr 1.6 Procalcitonin 0.31 lactic acid 1.0 Gl 110  WBC: 20.7 Hg 10.8 Plats 305   CT: Rim-enhancing fluid collection with a few foci of air in the subcutaneous tissues of the left buttocks adjacent to the left aspect of the anus measuring up to 6.8 cm, most compatible with a perianal abscess. There is no discrete supralevator extension.    Past Medical History    Past Medical History:   Diagnosis Date    Refusal of blood transfusions as patient is Pentecostalism        Past Surgical History   Past Surgical History:   Procedure Laterality Date    COLONOSCOPY      CV CORONARY ANGIOGRAM  2018    NO stent needed    EYE MUSCLE SURGERY      TOTAL KNEE ARTHROPLASTY Right     TUBAL LIGATION         Medications   I have reviewed this patient's current medications       Review of Systems    Pertinent positives and negatives documented in HPI.      Physical Exam   Vital Signs: Temp: 99.1  F (37.3  C) Temp src: Tympanic BP: (!) 102/40 Pulse: 70   Resp: 20 SpO2: 93 % O2 Device: None  (Room air)    Weight: 179 lbs 14.33 oz    General Appearance: Not in distress. Fully awake.   Respiratory: clear vesicular  Cardiovascular: regular, no S3, no M/G/R  GI: soft, not tender, no HSM  Skin: warm, no rash  MS: post R total knee. No signs of gout flair.  Other: Awake, alert, mildly anxious.      Medical Decision Making       50 MINUTES SPENT BY ME on the date of service doing chart review, history, exam, documentation & further activities per the note.      Data     I have personally reviewed the following data over the past 24 hrs:    20.7 (H)  \   10.8 (L)   / 305     139 104 38.1 (H) /  110 (H)   4.3 23 1.60 (H) \     Procal: 0.31 (H) CRP: N/A Lactic Acid: 1.0         Imaging results reviewed over the past 24 hrs:   Recent Results (from the past 24 hour(s))   CT Abdomen Pelvis w Contrast    Narrative    Exam: CT ABDOMEN PELVIS W CONTRAST    Exam reason: left perineal mass    Technique: Using helical CT technique, axial images of the abdomen and  pelvis were obtained with IV contrast.  Coronal and sagittal  reconstructions also performed. This CT was performed using one or  more of the following dose reduction techniques: automated exposure  control, adjustment of the mA and/or kV according to patient size,  and/or use of iterative reconstruction technique.    Meds/Contrast: Isovue 370 86ml Given    Comparison: None.     FINDINGS:    ABDOMEN:    Liver: No mass or any significant abnormality.  Gallbladder: No calcified gallstones.   Bile Ducts: No biliary ductal dilation.   Spleen:  No splenomegaly or focal lesion.  Pancreas: No mass, ductal dilatation, or inflammatory changes.  Kidneys: No solid mass, hydronephrosis, or any definite calculi.   Adrenals:  No nodules.   Lymph Nodes: No adenopathy.   Vascular: No aortic aneurysm.   Abdominal Wall: No acute findings.     PELVIS:   No mass or adenopathy.     There is a rim-enhancing fluid collection with a few foci of air in  the subcutaneous tissues of the  left buttocks, to the left of the  anus. This measures 6.8 x 5.3 x 4.2 cm (series 2 image 87 and series 4  image 64). This is most compatible with a perianal abscess. There is  no discrete supralevator extension. There is stranding of the  surrounding fat.    Bowel/Mesentery/Peritoneum:   -No bowel obstruction.   -Normal appendix.  -No ascites.    Visualized portions of the Chest: No consolidation or pleural  effusion. No acute osseous abnormalities. There are scattered  degenerative changes of the visualized spine.  Musculoskeletal: No acute findings.       Impression    IMPRESSION:  Rim-enhancing fluid collection with a few foci of air in the  subcutaneous tissues of the left buttocks adjacent to the left aspect  of the anus measuring up to 6.8 cm, most compatible with a perianal  abscess. There is no discrete supralevator extension.    CATHY LIN MD         SYSTEM ID:  [id number]

## 2023-10-02 NOTE — TELEPHONE ENCOUNTER
Spoke with dtr -- abscess restarted 36hrs ago.  Pt can not sit . Getting large again. I spoke with DR Sandoval - he wants her evaluated in ER. Spoke with DR Benitez. - pt sent from home to ER.

## 2023-10-02 NOTE — OR NURSING
Patient arrives to 3124 via stretcher.  Patient has eyes open and is awake at this time.  Patient is shocked that she is all done with her surgery.  Bedside report received from Efraín RASMUSSEN and Jordyn RAMIREZ.  Patient placed on cardiac monitor, BP monitor and continuous pulse oximetry.  Patient assisted to roll on her side and tolerated well.  Wound check done; Iodoform packing noted; ABD pad replaced as well as an ice pack.  Rest of nursing assessment as charted.

## 2023-10-02 NOTE — ANESTHESIA PREPROCEDURE EVALUATION
Anesthesia Pre-Procedure Evaluation    Patient: Any Selby   MRN: [id number] : 1945        Procedure : Procedure(s):  INCISION AND DRAINAGE, ABSCESS, COMPLEX          Past Medical History:   Diagnosis Date     Refusal of blood transfusions as patient is Hoahaoism       Past Surgical History:   Procedure Laterality Date     COLONOSCOPY       CV CORONARY ANGIOGRAM      NO stent needed     EYE MUSCLE SURGERY       TOTAL KNEE ARTHROPLASTY Right      TUBAL LIGATION        No Known Allergies   Social History     Tobacco Use     Smoking status: Never     Smokeless tobacco: Never   Substance Use Topics     Alcohol use: Not on file      Wt Readings from Last 1 Encounters:   23 79 kg (174 lb 1 oz)        Anesthesia Evaluation   Pt has had prior anesthetic. Type: General.    History of anesthetic complications  - PONV.      ROS/MED HX  ENT/Pulmonary:     (+) sleep apnea, mild, doesn't use CPAP,                                     Neurologic: Comment: Hx cognitive and behavioral changes       Cardiovascular:     (+) Dyslipidemia - -   -  - -      CHF  Last EF: 40                              METS/Exercise Tolerance: >4 METS    Hematologic:  - neg hematologic  ROS     Musculoskeletal:   (+)  arthritis,             GI/Hepatic:  - neg GI/hepatic ROS     Renal/Genitourinary:     (+) renal disease, type: CRI,            Endo:     (+)               Obesity,       Psychiatric/Substance Use:  - neg psychiatric ROS     Infectious Disease: Comment: Perianal abscess    (+) Recent Fever,           Malignancy:  - neg malignancy ROS     Other:  - neg other ROS    (+)  , H/O Chronic Pain,         Physical Exam    Airway        Mallampati: III   TM distance: > 3 FB   Neck ROM: full   Mouth opening: < 3 cm    Respiratory Devices and Support         Dental       (+) Modest Abnormalities - crowns, retainers, 1 or 2 missing teeth      Cardiovascular   cardiovascular exam normal          Pulmonary   pulmonary exam  normal            OUTSIDE LABS:  CBC:   Lab Results   Component Value Date    WBC 20.7 (H) 10/02/2023    WBC 9.4 09/26/2023    HGB 10.8 (L) 10/02/2023    HGB 11.9 09/26/2023    HCT 33.2 (L) 10/02/2023    HCT 36.9 09/26/2023     10/02/2023     09/26/2023     BMP:   Lab Results   Component Value Date     10/02/2023     09/26/2023    POTASSIUM 4.3 10/02/2023    POTASSIUM 4.8 09/26/2023    CHLORIDE 104 10/02/2023    CHLORIDE 103 09/26/2023    CO2 23 10/02/2023    CO2 23 09/26/2023    BUN 38.1 (H) 10/02/2023    BUN 39.1 (H) 09/26/2023    CR 1.60 (H) 10/02/2023    CR 1.38 (H) 09/26/2023     (H) 10/02/2023     (H) 09/26/2023     COAGS: No results found for: PTT, INR, FIBR  POC: No results found for: BGM, HCG, HCGS  HEPATIC:   Lab Results   Component Value Date    ALBUMIN 4.3 09/26/2023    PROTTOTAL 7.6 09/26/2023    ALT <5 09/26/2023    AST 14 09/26/2023    ALKPHOS 110 (H) 09/26/2023    BILITOTAL 0.7 09/26/2023     OTHER:   Lab Results   Component Value Date    LACT 1.0 10/02/2023    GEORGE 8.8 10/02/2023    TSH 2.49 09/26/2023       Anesthesia Plan    ASA Status:  3, emergent    NPO Status:  NPO Appropriate    Anesthesia Type: General.     - Airway: LMA   Induction: Intravenous, Propofol.   Maintenance: TIVA.        Consents    Anesthesia Plan(s) and associated risks, benefits, and realistic alternatives discussed. Questions answered and patient/representative(s) expressed understanding.     - Discussed: Risks, Benefits and Alternatives for BOTH SEDATION and the PROCEDURE were discussed     - Discussed with:  Patient      - Extended Intubation/Ventilatory Support Discussed: Yes.      - Patient is DNR/DNI Status: No     Use of blood products discussed: Yes.     - Discussed with: Patient.      - Refused: Blood products refused             Reason for refusal: Gnosticist.     - Willing to accept: None     Postoperative Care    Pain management: IV analgesics.   PONV prophylaxis:  Ondansetron (or other 5HT-3), Dexamethasone or Solumedrol     Comments:    Other Comments: Discussed risks and benefits with patient for general anesthesia including sore throat, nausea, vomiting, aspiration, dental damage, loss of airway, CV complications, stroke, MI, death. Pt wishes to proceed.             Sarabjit Lau, LESTER CRNA

## 2023-10-02 NOTE — OP NOTE
REPORT OF OPERATION  DATE OF PROCEDURE: 10/2/2023    PATIENT: Any Selby    SURGERY PERFORMED: Patient and drainage of left perianal abscess    PREOPERATIVE DIAGNOSIS: Left perianal abscess    POSTOPERATIVE DIAGNOSIS: Same    SURGEON: Stu Francisco MD    ASSISTANTS: None    ANESTHESIA: General Endotracheal Anesthesia    COMPLICATIONS: None apparent    TRANSFUSIONS: None    TISSUE TO PATHOLOGY: Cultures to pathology for culture and sensitivity    FINDINGS: Relatively large perianal abscess on the left extending from the perianal area up towards the left vulva    INDICATIONS: This is a 77 year old female with left perianal abscess.  Patient will be taken the operating room for incision and drainage.    DESCRIPTIONS OF PROCEDURE IN DETAIL: After consent was obtained the patient was taken to the operative suite and buffy in the supine position.  The patient was identified and the correct patient was confirmed.  General Endotracheal Anesthesia was administered by anesthesia.  The patient was then placed in a high lithotomy position.  The patient was sterilely prepped and draped in the usual fashion.  A time out was performed verifying the correct patient and the correct procedure.  The entire operative team was in agreement.  All necessary equipment and supplies were in the room.    On the left side there was an obvious area of firmness consistent with a perianal abscess.  Using an 11 blade introduced and the abdomen was accomplished.  A large amount of pus was removed.  Cultures were taken.  These were sent to pathology for culture and sensitivity.  Desiccation yielded a deep cavity that extended towards the left vulva.  Cavity was completely explored and no other abnormalities were noted.  The cavity was irrigated with saline and then packed with 2 inch iodoform gauze.  Dressings were applied.    All needle,  instrument and sponge counts were correct x2.  The patient was awakened in the operating room and  taken to the recovery room in stable condition tolerated procedure well.

## 2023-10-02 NOTE — ANESTHESIA PROCEDURE NOTES
Airway       Patient location during procedure: OR  Staff -        CRNA: Sarabjit Lau APRN CRNA       Performed By: CRNA  Consent for Airway        Urgency: elective  Indications and Patient Condition       Indications for airway management: yanna-procedural       Induction type:intravenous       Mask difficulty assessment: 1 - vent by mask    Final Airway Details       Final airway type: supraglottic airway    Supraglottic Airway Details        Type: LMA       Brand: I-Gel       LMA size: 4    Post intubation assessment        Placement verified by: capnometry, equal breath sounds and chest rise        Number of attempts at approach: 1       Secured with: plastic tape       Ease of procedure: easy       Dentition: Intact and Unchanged

## 2023-10-02 NOTE — ED PROVIDER NOTES
"  History     Chief Complaint   Patient presents with    Vaginal Problem     HPI  Any Selby is a 77 year old individual with history of left-sided heart failure, cognitive/behavioral abnormalities, stage III chronic kidney disease, degenerative disc disease of the lumbar spine, chronic pain, comes in for vulvular pain.  Patient states had vulvular abscess drainage conducted in [place] at the end of August.  States that she is starting to get \"a lump in that area\" and difficulty sitting down again so is sent into the ER from PCPs office for evaluation.  Apparently PCPs office did contact OB/GYN and was advised to come to the ER.  Patient denies fever or chills.  States has had urinary incontinence since previous surgery in August 2023.  States has chronic back pain that is unchanged than usual.  Denies any hematuria or dysuria.  Denies any vaginal bleeding or discharge.  Denies any abdominal pain, nausea, vomiting.  States has been having intermittent bowel issues and takes Doculax with good relief.    Allergies:  No Known Allergies    Problem List:    Patient Active Problem List    Diagnosis Date Noted    Perianal abscess 10/02/2023     Priority: Medium    Perirectal abscess 10/02/2023     Priority: Medium    Chronic systolic congestive heart failure (H) 09/26/2023     Priority: Medium    Cognitive and behavioral changes 09/26/2023     Priority: Medium    Vulvar abscess 09/26/2023     Priority: Medium    Stage 3a chronic kidney disease (H) 09/26/2023     Priority: Medium    Gout of multiple sites, unspecified cause, unspecified chronicity 09/26/2023     Priority: Medium    Mixed hyperlipidemia 09/26/2023     Priority: Medium    Primary osteoarthritis of both knees 09/26/2023     Priority: Medium    DDD (degenerative disc disease), lumbar 09/26/2023     Priority: Medium    Refusal of blood transfusions as patient is Orthodoxy 09/26/2023     Priority: Medium    Left heart failure (H) 01/04/2019    "  Priority: Medium    Chronic pain 06/21/2012     Priority: Medium    Obesity 01/10/2011     Priority: Medium        Past Medical History:    Past Medical History:   Diagnosis Date    Refusal of blood transfusions as patient is Jew        Past Surgical History:    Past Surgical History:   Procedure Laterality Date    COLONOSCOPY      CV CORONARY ANGIOGRAM  2018    NO stent needed    EYE MUSCLE SURGERY      TOTAL KNEE ARTHROPLASTY Right     TUBAL LIGATION         Family History:    No family history on file.    Social History:  Marital Status:   [5]  Social History     Tobacco Use    Smoking status: Never    Smokeless tobacco: Never   Vaping Use    Vaping Use: Never used        Medications:    acetaminophen (TYLENOL) 500 MG tablet  aspirin 81 MG EC tablet  carvedilol (COREG) 6.25 MG tablet  colchicine (COLCRYS) 0.6 MG tablet  furosemide (LASIX) 20 MG tablet  lisinopril (ZESTRIL) 10 MG tablet  predniSONE (DELTASONE) 10 MG tablet              Physical Exam   BP: 95/62  Pulse: 85  Temp: 99.6  F (37.6  C)  Resp: 16  SpO2: 96 %      GENERAL APPEARANCE:  The patient is a 77 year old well-developed, well-nourished individual that appears as stated age.  NECK:  Supple.  Trachea is midline.    LUNGS:  Breathing is easy.  Breath sounds are equal and clear bilaterally.  No wheezes, rhonchi, or rales.  HEART:  Regular rate and rhythm with normal S1 and S2.  No murmurs, gallops, or rubs.  ABDOMEN:  Soft and rotund.  No mass, guarding, or rebound.  Lower abdominal tenderness to palpation.  No organomegaly or hernia.  Bowel sounds are present.  Bilateral CVA tenderness to palpation.  No abdominal bruits or thrills present upon auscultation/palpation.  GENITOURINARY:  Assessment completed with chaperone Frankie Gage RN present.  External genitalia normal.  Left perineum with mass and tenderness present.  No crepitus present.   NEUROLOGIC:  No focal sensory or motor deficits are noted.    PSYCHIATRIC:  The  patient is awake, alert, and oriented x4.  Recent and remote memory is intact.  Appropriate mood and affect.  Calm and cooperative with history and physical exam.  SKIN:  Warm, dry, and well perfused.  Good turgor.  Tenderness to palpation to left perineal area with mass.  No open areas present.      Comment: Discrepancies between my note and notes on behalf of the nursing team or other care providers are secondary to my findings reflecting my physical examination and questioning of the patient.  Any conflicting information provided is not in line with my examination of the patient.       ED Course              ED Course as of 10/02/23 1613   Mon Oct 02, 2023   1225 In to see patient and history/physical completed.    1225 Labs ordered.   1316 CBC with platelets differential(!)  WBC 20.7 when 6 days prior was 9.4.  Sepsis lab work initiated.  1 L LR ordered.   1500 CT scan of abdomen pelvis with IV contrast shows rim-enhancing fluid collection with new foci of air in the subcutaneous tissues of the left buttocks adjacent to the left aspect of the anus measuring 6.8 cm, most compatible with perianal abscess.  There is no discrete supralevator extension.   1515 Cipro 200 mg IV and Flagyl 500 mg IV ordered.   1549 Discussed case with general surgeon Dr. Francisco.  Dr. Francisco did come down and see patient.   1607 Will be admitted to surgery and then will need Medr bed.  Patient to be admitted by Dr. Stu Francisco and have Hospitalist to consult.                 Results for orders placed or performed during the hospital encounter of 10/02/23 (from the past 24 hour(s))   CBC with platelets differential    Narrative    The following orders were created for panel order CBC with platelets differential.  Procedure                               Abnormality         Status                     ---------                               -----------         ------                     CBC with platelets and d...[[id number]]  Abnormal             Final result                 Please view results for these tests on the individual orders.   Basic metabolic panel   Result Value Ref Range    Sodium 139 135 - 145 mmol/L    Potassium 4.3 3.4 - 5.3 mmol/L    Chloride 104 98 - 107 mmol/L    Carbon Dioxide (CO2) 23 22 - 29 mmol/L    Anion Gap 12 7 - 15 mmol/L    Urea Nitrogen 38.1 (H) 8.0 - 23.0 mg/dL    Creatinine 1.60 (H) 0.51 - 0.95 mg/dL    GFR Estimate 33 (L) >60 mL/min/1.73m2    Calcium 8.8 8.8 - 10.2 mg/dL    Glucose 110 (H) 70 - 99 mg/dL   CBC with platelets and differential   Result Value Ref Range    WBC Count 20.7 (H) 4.0 - 11.0 10e3/uL    RBC Count 3.28 (L) 3.80 - 5.20 10e6/uL    Hemoglobin 10.8 (L) 11.7 - 15.7 g/dL    Hematocrit 33.2 (L) 35.0 - 47.0 %     (H) 78 - 100 fL    MCH 32.9 26.5 - 33.0 pg    MCHC 32.5 31.5 - 36.5 g/dL    RDW 13.2 10.0 - 15.0 %    Platelet Count 305 150 - 450 10e3/uL    % Neutrophils 86 %    % Lymphocytes 7 %    % Monocytes 6 %    Mids % (Monos, Eos, Basos)      % Eosinophils 0 %    % Basophils 0 %    % Immature Granulocytes 1 %    NRBCs per 100 WBC 0 <1 /100    Absolute Neutrophils 17.6 (H) 1.6 - 8.3 10e3/uL    Absolute Lymphocytes 1.5 0.8 - 5.3 10e3/uL    Absolute Monocytes 1.3 0.0 - 1.3 10e3/uL    Mids Abs (Monos, Eos, Basos)      Absolute Eosinophils 0.1 0.0 - 0.7 10e3/uL    Absolute Basophils 0.1 0.0 - 0.2 10e3/uL    Absolute Immature Granulocytes 0.1 <=0.4 10e3/uL    Absolute NRBCs 0.0 10e3/uL   Extra Tube    Narrative    The following orders were created for panel order Extra Tube.  Procedure                               Abnormality         Status                     ---------                               -----------         ------                     Extra Blue Top Tube[[id number]]                              Final result               Extra Red Top Tube[[id number]]                               Final result               Extra Green Top (Lithium...[[id number]]                      Final result                  Please view results for these tests on the individual orders.   Extra Blue Top Tube   Result Value Ref Range    Hold Specimen JIC    Extra Red Top Tube   Result Value Ref Range    Hold Specimen JIC    Extra Green Top (Lithium Heparin) ON ICE   Result Value Ref Range    Hold Specimen JIC    Procalcitonin   Result Value Ref Range    Procalcitonin 0.31 (H) <0.05 ng/mL   UA with Microscopic reflex to Culture    Specimen: Urine, Clean Catch   Result Value Ref Range    Color Urine Light Yellow Colorless, Straw, Light Yellow, Yellow    Appearance Urine Clear Clear    Glucose Urine Negative Negative mg/dL    Bilirubin Urine Negative Negative    Ketones Urine Negative Negative mg/dL    Specific Gravity Urine 1.011 1.003 - 1.035    Blood Urine Negative Negative    pH Urine 5.0 4.7 - 8.0    Protein Albumin Urine Negative Negative mg/dL    Urobilinogen Urine Normal Normal, 2.0 mg/dL    Nitrite Urine Negative Negative    Leukocyte Esterase Urine Negative Negative    Bacteria Urine Few (A) None Seen /HPF    Mucus Urine Present (A) None Seen /LPF    RBC Urine 1 <=2 /HPF    WBC Urine <1 <=5 /HPF    Squamous Epithelials Urine 1 <=1 /HPF    Hyaline Casts Urine 6 (H) <=2 /LPF    Narrative    Urine Culture not indicated   Lactic acid whole blood   Result Value Ref Range    Lactic Acid 1.0 0.7 - 2.0 mmol/L   CT Abdomen Pelvis w Contrast    Narrative    Exam: CT ABDOMEN PELVIS W CONTRAST    Exam reason: left perineal mass    Technique: Using helical CT technique, axial images of the abdomen and  pelvis were obtained with IV contrast.  Coronal and sagittal  reconstructions also performed. This CT was performed using one or  more of the following dose reduction techniques: automated exposure  control, adjustment of the mA and/or kV according to patient size,  and/or use of iterative reconstruction technique.    Meds/Contrast: Isovue 370 86ml Given    Comparison: None.     FINDINGS:    ABDOMEN:    Liver: No mass or any significant  abnormality.  Gallbladder: No calcified gallstones.   Bile Ducts: No biliary ductal dilation.   Spleen:  No splenomegaly or focal lesion.  Pancreas: No mass, ductal dilatation, or inflammatory changes.  Kidneys: No solid mass, hydronephrosis, or any definite calculi.   Adrenals:  No nodules.   Lymph Nodes: No adenopathy.   Vascular: No aortic aneurysm.   Abdominal Wall: No acute findings.     PELVIS:   No mass or adenopathy.     There is a rim-enhancing fluid collection with a few foci of air in  the subcutaneous tissues of the left buttocks, to the left of the  anus. This measures 6.8 x 5.3 x 4.2 cm (series 2 image 87 and series 4  image 64). This is most compatible with a perianal abscess. There is  no discrete supralevator extension. There is stranding of the  surrounding fat.    Bowel/Mesentery/Peritoneum:   -No bowel obstruction.   -Normal appendix.  -No ascites.    Visualized portions of the Chest: No consolidation or pleural  effusion. No acute osseous abnormalities. There are scattered  degenerative changes of the visualized spine.  Musculoskeletal: No acute findings.       Impression    IMPRESSION:  Rim-enhancing fluid collection with a few foci of air in the  subcutaneous tissues of the left buttocks adjacent to the left aspect  of the anus measuring up to 6.8 cm, most compatible with a perianal  abscess. There is no discrete supralevator extension.    CATHY LIN MD         SYSTEM ID:  [id number]       Medications   ciprofloxacin (CIPRO) infusion 200 mg (has no administration in time range)   metroNIDAZOLE (FLAGYL) infusion 500 mg (500 mg Intravenous $New Bag 10/2/23 1544)   lactated ringers BOLUS 1,000 mL (1,000 mLs Intravenous $New Bag 10/2/23 1331)   sodium chloride (PF) 0.9% PF flush 50 mL (50 mLs Intravenous $Given 10/2/23 1410)   iopamidol (ISOVUE-370) solution 86 mL (86 mLs Intravenous $Given 10/2/23 1410)       Assessments & Plan (with Medical Decision Making)     I have reviewed the nursing  notes.    I have reviewed the findings, diagnosis, plan and need for follow up with the patient.      Summary:  Patient presents to the ER today vulvular abscess.  Potential diagnosis which have been considered and evaluated include abscess PID, UTI, perianal abscess, as well as others. Many of these have been excluded using the various modalities and assessment as noted on the chart. At the present time, the diagnosis given seems to be the most likely perianal abscess.  Upon arrival, vitals signs show blood pressure 95/62 with a pulse of 85.  Temperature 37.6  C.  Respirations 16 with oxygenation of 95% on room air.  The patient is alert and oriented.  Cardiac and respiratory examination normal.  Lower abdominal/pelvic tenderness to palpation but no hernia or mass.  Bilateral CVA tenderness to palpation present.  Genitourinary examination did show mass and tenderness to left perineal area.  No abnormality of vulva present.  Lab work obtained showing WBC of 20.7 with hemoglobin of 10.8 which has worsened from 6 days previous.  Sepsis work-up was initiated showing lactic acid of 1.0 but does have procalcitonin of 0.31.  Electrolytes benign but does have BUN of 38.1 with creatinine of 1.6 and GFR of 33.  1 L of LR initiated.  UA obtained showing no acute abnormalities.  CT of abdomen pelvis was ordered showing likely perianal abscess on the left with no supraelevator extension.  Patient received Cipro 200 mg and Flagyl 500 mg in ER due to these findings.  With these findings did discuss case with general surgeon Dr. Stu Francisco.  Patient was seen by general surgery.  Patient to be admitted by surgery with Hospitalist to consult.  Patient to go to surgery for drainage of perianal abscess.      Critical Care Time: None     Impression and plan discussed with patient. Questions answered, concerns addressed, indications for urgent re-evaluation reviewed, and  given. Patient/Parent/Caregiver agree with treatment  plan and have no further questions at this time.      This note was created by the Dragon Voice Dictation System. Inadvertent typographical errors, due to software recognition problems, may still exist.             New Prescriptions    No medications on file       Final diagnoses:   Perianal abscess       10/2/2023   [facility]       Major Guerra, LESTER CNP  10/02/23 5907

## 2023-10-02 NOTE — OR NURSING
"Patient moved to inpatient room.  Bedside report given to Fan RN's.  Patient remains awake/alert and interactive.  Pain 3/10 and \"tolerable\"  assisted in turning patient and placing new ABD pad to surgical site as well as new mesh underwear.  Patient tolerated well.  IV infusing and remains intact.  No further questions and/or concerns at this time.  Care transferred.  Daughter called and update given.  "

## 2023-10-02 NOTE — ANESTHESIA PREPROCEDURE EVALUATION
Anesthesia Pre-Procedure Evaluation    Patient: Any Selby   MRN: [id number] : 1945        Procedure :           Past Medical History:   Diagnosis Date     Refusal of blood transfusions as patient is Buddhism       Past Surgical History:   Procedure Laterality Date     COLONOSCOPY       CV CORONARY ANGIOGRAM      NO stent needed     EYE MUSCLE SURGERY       TOTAL KNEE ARTHROPLASTY Right      TUBAL LIGATION        No Known Allergies   Social History     Tobacco Use     Smoking status: Never     Smokeless tobacco: Never   Substance Use Topics     Alcohol use: Not on file      Wt Readings from Last 1 Encounters:   23 79 kg (174 lb 1 oz)        Anesthesia Evaluation   Pt has had prior anesthetic. Type: MAC and General.        ROS/MED HX  ENT/Pulmonary:     (+) sleep apnea, doesn't use CPAP,                                     Neurologic: Comment: Hx cognitive and behavioral changes       Cardiovascular:     (+) Dyslipidemia - -   -  - -      CHF  Last EF: 40                              METS/Exercise Tolerance: >4 METS    Hematologic:  - neg hematologic  ROS     Musculoskeletal:   (+)  arthritis,             GI/Hepatic:  - neg GI/hepatic ROS     Renal/Genitourinary:     (+) renal disease, type: CRI,            Endo:     (+)               Obesity,       Psychiatric/Substance Use:  - neg psychiatric ROS     Infectious Disease: Comment: Perianal abscess    (+) Recent Fever,           Malignancy:  - neg malignancy ROS     Other:  - neg other ROS    (+)  , H/O Chronic Pain,         Physical Exam    Airway        Mallampati: III   TM distance: > 3 FB   Neck ROM: full   Mouth opening: < 3 cm    Respiratory Devices and Support         Dental       (+) Modest Abnormalities - crowns, retainers, 1 or 2 missing teeth      Cardiovascular   cardiovascular exam normal          Pulmonary   pulmonary exam normal            OUTSIDE LABS:  CBC:   Lab Results   Component Value Date    WBC 20.7 (H)  10/02/2023    WBC 9.4 09/26/2023    HGB 10.8 (L) 10/02/2023    HGB 11.9 09/26/2023    HCT 33.2 (L) 10/02/2023    HCT 36.9 09/26/2023     10/02/2023     09/26/2023     BMP:   Lab Results   Component Value Date     10/02/2023     09/26/2023    POTASSIUM 4.3 10/02/2023    POTASSIUM 4.8 09/26/2023    CHLORIDE 104 10/02/2023    CHLORIDE 103 09/26/2023    CO2 23 10/02/2023    CO2 23 09/26/2023    BUN 38.1 (H) 10/02/2023    BUN 39.1 (H) 09/26/2023    CR 1.60 (H) 10/02/2023    CR 1.38 (H) 09/26/2023     (H) 10/02/2023     (H) 09/26/2023     COAGS: No results found for: PTT, INR, FIBR  POC: No results found for: BGM, HCG, HCGS  HEPATIC:   Lab Results   Component Value Date    ALBUMIN 4.3 09/26/2023    PROTTOTAL 7.6 09/26/2023    ALT <5 09/26/2023    AST 14 09/26/2023    ALKPHOS 110 (H) 09/26/2023    BILITOTAL 0.7 09/26/2023     OTHER:   Lab Results   Component Value Date    LACT 1.0 10/02/2023    GEORGE 8.8 10/02/2023    TSH 2.49 09/26/2023       Anesthesia Plan    ASA Status:  3, emergent    NPO Status:  NPO Appropriate    Anesthesia Type: General.     - Airway: LMA   Induction: Intravenous, Propofol.   Maintenance: Balanced.        Consents    Anesthesia Plan(s) and associated risks, benefits, and realistic alternatives discussed. Questions answered and patient/representative(s) expressed understanding.     - Discussed: Risks, Benefits and Alternatives for BOTH SEDATION and the PROCEDURE were discussed     - Discussed with:  Patient      - Extended Intubation/Ventilatory Support Discussed: Yes.      - Patient is DNR/DNI Status: No     Use of blood products discussed: Yes.     - Discussed with: Patient.      - Refused: Blood products refused             Reason for refusal: Latter day.     - Willing to accept: None     Postoperative Care    Pain management: IV analgesics.   PONV prophylaxis: Dexamethasone or Solumedrol, Ondansetron (or other 5HT-3)     Comments:    Other Comments:  Discussed risks and benefits with patient for general anesthesia including sore throat, nausea, vomiting, aspiration, dental damage, loss of airway, CV complications, stroke, MI, death. Pt wishes to proceed.             LESTER Longoria CRNA

## 2023-10-02 NOTE — TELEPHONE ENCOUNTER
10:42 AM    Reason for Call: OVERBOOK    Patient is having the following symptoms: Vulvar abscess    The patient is requesting an appointment for ASAP with Dr Hutchinson.    Was an appointment offered for this call? No    Preferred method for responding to this message: Telephone Call  What is your phone number ?[phone]     If we cannot reach you directly, may we leave a detailed response at the number you provided? Yes    Can this message wait until your PCP/provider returns, if unavailable today? Not applicable    Essence Jackson

## 2023-10-02 NOTE — ED TRIAGE NOTES
Reports vulvar abscess that has been increasing and more painful x2 days. Hx of abscess being surgically drained in August. Reports Dr Hutchinson is PCP and recommended ER.

## 2023-10-02 NOTE — H&P
Admission History and Physical  10/2/2023    Patient: Any Selby    Admitting Physician: Stu Francisco MD    Admitting diagnosis: Rectal abscess on the left    This is a 77 year old female who presented to the emergency room with perirectal pain.  Was found to have a white count of 21,000.  CT was obtained which showed evidence of a perirectal abscess on the left.  General surgery was called.      The patient gives a history of a valvular abscess proxy month ago which required incision and drainage.  Was doing well but now presents with this new onset of perianal pain.  No fever.  No chills.  It is difficult to sit or to defecate.     Past Medical History:  Past Medical History:   Diagnosis Date    Refusal of blood transfusions as patient is [employer]        Past Surgical History:  Past Surgical History:   Procedure Laterality Date    COLONOSCOPY      CV CORONARY ANGIOGRAM  2018    NO stent needed    EYE MUSCLE SURGERY      TOTAL KNEE ARTHROPLASTY Right     TUBAL LIGATION         Family History History:  No family history on file.    History of Tobacco Use:  History   Smoking Status    Never   Smokeless Tobacco    Never       Current Medications:  Current Outpatient Medications   Medication Sig Dispense Refill    acetaminophen (TYLENOL) 500 MG tablet Take 500 mg by mouth (Patient not taking: Reported on 9/26/2023)      aspirin 81 MG EC tablet Take 81 mg by mouth daily      carvedilol (COREG) 6.25 MG tablet Take 0.5 tablets (3.125 mg) by mouth 2 times daily (with meals) 90 tablet 3    colchicine (COLCRYS) 0.6 MG tablet 2 tablets at onset of gout and may repeat in 1 hour to improve gout at onset and may take 1 tablet daily for 5 more days if gout persists 15 tablet 1    furosemide (LASIX) 20 MG tablet Take 1 tablet (20 mg) by mouth daily 90 tablet 3    lisinopril (ZESTRIL) 10 MG tablet Take 1 tablet (10 mg) by mouth daily 90 tablet 3    predniSONE (DELTASONE) 10 MG tablet 4 tabs orally for 4  days then 2 tabs orally for 4 days then 1 tablet orally for 4 days 28 tablet 1       Allergies:  No Known Allergies    ROS:  Pertinent items are noted in HPI.  All other systems are negative.    PHYSICAL EXAM:     Vital signs: /58   Pulse 85   Temp 99.6  F (37.6  C) (Tympanic)   Resp 16   SpO2 99%    Weight: [unfilled]   BMI: There is no height or weight on file to calculate BMI.   General: Normal, healthy, cooperative, in no acute distress, alert   Skin: no jaundice   HEENT: PERRLA and EOMI   Neck: supple   Lungs: clear to auscultation   CV: Regular rate and rhythm without murmer   Abdominal: abdomen is soft without significant tenderness, masses, organomegaly or guarding   Extremities: No cyanosis, clubbing or edema noted bilaterally in Upper and Lower Extremities   Neurological: without deficit    LABORATORY:  CBC Results:   Recent Labs   Lab Test 10/02/23  1248   WBC 20.7*   RBC 3.28*   HGB 10.8*   HCT 33.2*   *   MCH 32.9   MCHC 32.5   RDW 13.2          Last Comprehensive Metabolic Panel:  Sodium   Date Value Ref Range Status   10/02/2023 139 135 - 145 mmol/L Final     Comment:     Reference intervals for this test were updated on 09/26/2023 to more accurately reflect our healthy population. There may be differences in the flagging of prior results with similar values performed with this method. Interpretation of those prior results can be made in the context of the updated reference intervals.      Potassium   Date Value Ref Range Status   10/02/2023 4.3 3.4 - 5.3 mmol/L Final     Chloride   Date Value Ref Range Status   10/02/2023 104 98 - 107 mmol/L Final     Carbon Dioxide (CO2)   Date Value Ref Range Status   10/02/2023 23 22 - 29 mmol/L Final     Anion Gap   Date Value Ref Range Status   10/02/2023 12 7 - 15 mmol/L Final     Glucose   Date Value Ref Range Status   10/02/2023 110 (H) 70 - 99 mg/dL Final     Urea Nitrogen   Date Value Ref Range Status   10/02/2023 38.1 (H) 8.0 - 23.0  mg/dL Final     Creatinine   Date Value Ref Range Status   10/02/2023 1.60 (H) 0.51 - 0.95 mg/dL Final     GFR Estimate   Date Value Ref Range Status   10/02/2023 33 (L) >60 mL/min/1.73m2 Final     Calcium   Date Value Ref Range Status   10/02/2023 8.8 8.8 - 10.2 mg/dL Final     Bilirubin Total   Date Value Ref Range Status   09/26/2023 0.7 <=1.2 mg/dL Final     Alkaline Phosphatase   Date Value Ref Range Status   09/26/2023 110 (H) 35 - 104 U/L Final     ALT   Date Value Ref Range Status   09/26/2023 <5 0 - 50 U/L Final     Comment:     Reference intervals for this test were updated on 6/12/2023 to more accurately reflect our healthy population. There may be differences in the flagging of prior results with similar values performed with this method. Interpretation of those prior results can be made in the context of the updated reference intervals.       AST   Date Value Ref Range Status   09/26/2023 14 0 - 45 U/L Final     Comment:     Reference intervals for this test were updated on 6/12/2023 to more accurately reflect our healthy population. There may be differences in the flagging of prior results with similar values performed with this method. Interpretation of those prior results can be made in the context of the updated reference intervals.       RADIOLOGY:    I have reviewed the patients CT of the abdomen and pelvis and agree with its findings.    ASSESSMENT: This is a 77 year old female with a perirectal abscess on the left    PLAN: The patient will be admitted to the hospital and will be taken to the operating room for incision and drainage of left yanna-rectal abscess.       The plan was dicussed with the patient and/or his/her care giver. All of their questions were answered.  They consent to the plan above.     The risks, benefits, and alternatives to the planned procedure were fully discussed with the patient and/or the patient's representative(s). The risks of bleeding, infection, death, missing  pathology, the need for additional procedures intra-operatively, the possible need for intra-operative consults, the possible need for transfusion therapy, cardiopulmonary compromise, the possible need for additional surgery for a complication were discussed with the patient and/or the patient's representative(s). The patient's and/or patient's representative(s) questions were addressed and answered. Informed consent was obtained from the patient and/or the patient's representative(s). The patient and/or the patient's representative(s) consent to proceed.

## 2023-10-02 NOTE — OR NURSING
PACU Respiratory Event Documentation     1) Episodes of Apnea greater than or equal to 10 seconds: 0    2) Bradypnea - less than 8 breaths per minute: 0    3) Pain score on 0 to 10 scale: 3    4) Pain-sedation mismatch (yes or no): no    5) Repeated 02 desaturation less than 90% (yes or no): no    Anesthesia notified? (yes or no): n/a    Any of the above events occuring repeatedly in separate 30 minute intervals may be considered recurrent PACU respiratory events.

## 2023-10-02 NOTE — ANESTHESIA CARE TRANSFER NOTE
Patient: Any Selby    Procedure: Procedure(s):  INCISION AND DRAINAGE, LEFT PERIRECTAL ABSCESS, COMPLEX       Diagnosis: Perirectal abscess [K61.1]  Diagnosis Additional Information: No value filed.    Anesthesia Type:   General     Note:    Oropharynx: spontaneously breathing  Level of Consciousness: awake  Oxygen Supplementation: nasal cannula  Level of Supplemental Oxygen (L/min / FiO2): 2  Independent Airway: airway patency satisfactory and stable  Dentition: dentition unchanged  Vital Signs Stable: post-procedure vital signs reviewed and stable  Report to RN Given: handoff report given  Patient transferred to: ICU    ICU Handoff: Call for PAUSE to initiate/utilize ICU HANDOFF, Identified Patient, Identified Responsible Provider, Reviewed the Pertinent Medical History, Discussed Surgical Course, Reviewed Intra-OP Anesthesia Management and Issues during Anesthesia, Set Expectations for Post Procedure Period and Allowed Opportunity for Questions and Acknowledgement of Understanding  Vitals:  Vitals Value Taken Time   BP     Temp     Pulse     Resp     SpO2         Electronically Signed By: LESTER Longoria CRNA  October 2, 2023  5:25 PM

## 2023-10-02 NOTE — ANESTHESIA POSTPROCEDURE EVALUATION
Patient: Any Selby    Procedure: Procedure(s):  INCISION AND DRAINAGE, LEFT PERIRECTAL ABSCESS, COMPLEX       Anesthesia Type:  General    Note:  Disposition: Inpatient   Postop Pain Control: Uneventful            Sign Out: Well controlled pain   PONV: No   Neuro/Psych: Uneventful            Sign Out: Acceptable/Baseline neuro status   Airway/Respiratory: Uneventful            Sign Out: Acceptable/Baseline resp. status   CV/Hemodynamics: Uneventful            Sign Out: Acceptable CV status; No obvious hypovolemia; No obvious fluid overload   Other NRE: NONE   DID A NON-ROUTINE EVENT OCCUR? No       Last vitals:  Vitals Value Taken Time   BP     Temp     Pulse 75 10/02/23 1743   Resp 30 10/02/23 1743   SpO2 96 % 10/02/23 1743   Vitals shown include unvalidated device data.    Electronically Signed By: LESTER Longoria CRNA  October 2, 2023  5:44 PM

## 2023-10-03 ENCOUNTER — APPOINTMENT (OUTPATIENT)
Dept: CARDIOLOGY | Facility: HOSPITAL | Age: 78
DRG: 854 | End: 2023-10-03
Attending: HOSPITALIST
Payer: MEDICARE

## 2023-10-03 LAB
ALBUMIN SERPL BCG-MCNC: 3.3 G/DL (ref 3.5–5.2)
ALBUMIN UR-MCNC: 20 MG/DL
ALP SERPL-CCNC: 83 U/L (ref 35–104)
ALT SERPL W P-5'-P-CCNC: 5 U/L (ref 0–50)
ANION GAP SERPL CALCULATED.3IONS-SCNC: 13 MMOL/L (ref 7–15)
APPEARANCE UR: ABNORMAL
AST SERPL W P-5'-P-CCNC: 11 U/L (ref 0–45)
BILIRUB DIRECT SERPL-MCNC: <0.2 MG/DL (ref 0–0.3)
BILIRUB SERPL-MCNC: 0.4 MG/DL
BILIRUB UR QL STRIP: NEGATIVE
BUN SERPL-MCNC: 35 MG/DL (ref 8–23)
CALCIUM SERPL-MCNC: 8.5 MG/DL (ref 8.8–10.2)
CHLORIDE SERPL-SCNC: 103 MMOL/L (ref 98–107)
COLOR UR AUTO: YELLOW
CREAT SERPL-MCNC: 1.44 MG/DL (ref 0.51–0.95)
DEPRECATED HCO3 PLAS-SCNC: 20 MMOL/L (ref 22–29)
EGFRCR SERPLBLD CKD-EPI 2021: 38 ML/MIN/1.73M2
ERYTHROCYTE [DISTWIDTH] IN BLOOD BY AUTOMATED COUNT: 13 % (ref 10–15)
GLUCOSE BLDC GLUCOMTR-MCNC: 143 MG/DL (ref 70–99)
GLUCOSE SERPL-MCNC: 145 MG/DL (ref 70–99)
GLUCOSE UR STRIP-MCNC: NEGATIVE MG/DL
GRAM STAIN RESULT: ABNORMAL
HCT VFR BLD AUTO: 30.1 % (ref 35–47)
HGB BLD-MCNC: 10.2 G/DL (ref 11.7–15.7)
HGB BLD-MCNC: 9.8 G/DL (ref 11.7–15.7)
HGB UR QL STRIP: NEGATIVE
HYALINE CASTS: 4 /LPF
KETONES UR STRIP-MCNC: 10 MG/DL
LEUKOCYTE ESTERASE UR QL STRIP: ABNORMAL
LVEF ECHO: NORMAL
MCH RBC QN AUTO: 32.9 PG (ref 26.5–33)
MCHC RBC AUTO-ENTMCNC: 32.6 G/DL (ref 31.5–36.5)
MCV RBC AUTO: 101 FL (ref 78–100)
MUCOUS THREADS #/AREA URNS LPF: PRESENT /LPF
NITRATE UR QL: NEGATIVE
PH UR STRIP: 5 [PH] (ref 4.7–8)
PLATELET # BLD AUTO: 260 10E3/UL (ref 150–450)
POTASSIUM SERPL-SCNC: 4.5 MMOL/L (ref 3.4–5.3)
PROT SERPL-MCNC: 6.7 G/DL (ref 6.4–8.3)
RBC # BLD AUTO: 2.98 10E6/UL (ref 3.8–5.2)
RBC URINE: 2 /HPF
SODIUM SERPL-SCNC: 136 MMOL/L (ref 135–145)
SP GR UR STRIP: 1.03 (ref 1–1.03)
SQUAMOUS EPITHELIAL: 5 /HPF
UROBILINOGEN UR STRIP-MCNC: NORMAL MG/DL
WBC # BLD AUTO: 17.1 10E3/UL (ref 4–11)
WBC URINE: 6 /HPF

## 2023-10-03 PROCEDURE — 999N000157 HC STATISTIC RCP TIME EA 10 MIN

## 2023-10-03 PROCEDURE — 250N000013 HC RX MED GY IP 250 OP 250 PS 637: Performed by: HOSPITALIST

## 2023-10-03 PROCEDURE — 80053 COMPREHEN METABOLIC PANEL: CPT | Performed by: HOSPITALIST

## 2023-10-03 PROCEDURE — 85027 COMPLETE CBC AUTOMATED: CPT | Performed by: SURGERY

## 2023-10-03 PROCEDURE — 93010 ELECTROCARDIOGRAM REPORT: CPT | Mod: RTG | Performed by: INTERNAL MEDICINE

## 2023-10-03 PROCEDURE — 93010 ELECTROCARDIOGRAM REPORT: CPT | Mod: RTG | Performed by: HOSPITALIST

## 2023-10-03 PROCEDURE — 93005 ELECTROCARDIOGRAM TRACING: CPT

## 2023-10-03 PROCEDURE — 250N000011 HC RX IP 250 OP 636: Mod: JZ | Performed by: SURGERY

## 2023-10-03 PROCEDURE — 250N000013 HC RX MED GY IP 250 OP 250 PS 637: Performed by: SURGERY

## 2023-10-03 PROCEDURE — 120N000001 HC R&B MED SURG/OB

## 2023-10-03 PROCEDURE — 87086 URINE CULTURE/COLONY COUNT: CPT | Performed by: HOSPITALIST

## 2023-10-03 PROCEDURE — 93306 TTE W/DOPPLER COMPLETE: CPT

## 2023-10-03 PROCEDURE — 93306 TTE W/DOPPLER COMPLETE: CPT | Mod: 26 | Performed by: INTERNAL MEDICINE

## 2023-10-03 PROCEDURE — 85018 HEMOGLOBIN: CPT | Performed by: HOSPITALIST

## 2023-10-03 PROCEDURE — 82248 BILIRUBIN DIRECT: CPT | Performed by: HOSPITALIST

## 2023-10-03 PROCEDURE — 36415 COLL VENOUS BLD VENIPUNCTURE: CPT | Performed by: HOSPITALIST

## 2023-10-03 PROCEDURE — 99232 SBSQ HOSP IP/OBS MODERATE 35: CPT | Performed by: HOSPITALIST

## 2023-10-03 PROCEDURE — 81001 URINALYSIS AUTO W/SCOPE: CPT | Performed by: HOSPITALIST

## 2023-10-03 PROCEDURE — 36415 COLL VENOUS BLD VENIPUNCTURE: CPT | Performed by: SURGERY

## 2023-10-03 PROCEDURE — 258N000003 HC RX IP 258 OP 636: Performed by: SURGERY

## 2023-10-03 RX ORDER — BISACODYL 5 MG/1
2 TABLET, DELAYED RELEASE ORAL DAILY PRN
COMMUNITY
End: 2024-04-17

## 2023-10-03 RX ORDER — BISACODYL 5 MG
10 TABLET, DELAYED RELEASE (ENTERIC COATED) ORAL DAILY PRN
Status: DISCONTINUED | OUTPATIENT
Start: 2023-10-03 | End: 2023-10-05 | Stop reason: HOSPADM

## 2023-10-03 RX ORDER — CIPROFLOXACIN 2 MG/ML
400 INJECTION, SOLUTION INTRAVENOUS EVERY 12 HOURS
Status: DISCONTINUED | OUTPATIENT
Start: 2023-10-03 | End: 2023-10-04

## 2023-10-03 RX ORDER — LIDOCAINE 50 MG/G
OINTMENT TOPICAL PRN
COMMUNITY
End: 2024-04-17

## 2023-10-03 RX ADMIN — METRONIDAZOLE 500 MG: 500 INJECTION, SOLUTION INTRAVENOUS at 03:48

## 2023-10-03 RX ADMIN — CIPROFLOXACIN 400 MG: 400 INJECTION, SOLUTION INTRAVENOUS at 18:58

## 2023-10-03 RX ADMIN — CARVEDILOL 3.12 MG: 3.12 TABLET, FILM COATED ORAL at 08:30

## 2023-10-03 RX ADMIN — OXYCODONE HYDROCHLORIDE 5 MG: 5 TABLET ORAL at 02:02

## 2023-10-03 RX ADMIN — CARVEDILOL 3.12 MG: 3.12 TABLET, FILM COATED ORAL at 17:39

## 2023-10-03 RX ADMIN — METRONIDAZOLE 500 MG: 500 INJECTION, SOLUTION INTRAVENOUS at 17:19

## 2023-10-03 RX ADMIN — BISACODYL 10 MG: 5 TABLET, COATED ORAL at 11:44

## 2023-10-03 RX ADMIN — BISACODYL 10 MG: 5 TABLET, COATED ORAL at 20:31

## 2023-10-03 RX ADMIN — SODIUM CHLORIDE, POTASSIUM CHLORIDE, SODIUM LACTATE AND CALCIUM CHLORIDE: 600; 310; 30; 20 INJECTION, SOLUTION INTRAVENOUS at 02:07

## 2023-10-03 ASSESSMENT — ACTIVITIES OF DAILY LIVING (ADL)
ADLS_ACUITY_SCORE: 33
ADLS_ACUITY_SCORE: 37
ADLS_ACUITY_SCORE: 32
EQUIPMENT_CURRENTLY_USED_AT_HOME: CANE, STRAIGHT
TOILETING_ISSUES: NO
ADLS_ACUITY_SCORE: 32
BATHING: 1-->ASSISTANCE NEEDED
DRESSING/BATHING_MANAGEMENT: ASSISTANCE
DRESS: 0-->ASSISTANCE NEEDED (DEVELOPMENTALLY APPROPRIATE)
DIFFICULTY_EATING/SWALLOWING: NO
CONCENTRATING,_REMEMBERING_OR_MAKING_DECISIONS_DIFFICULTY: NO
ADLS_ACUITY_SCORE: 32
ADLS_ACUITY_SCORE: 37
DRESSING/BATHING_DIFFICULTY: NO
ADLS_ACUITY_SCORE: 30
ADLS_ACUITY_SCORE: 32
ADLS_ACUITY_SCORE: 31
DRESSING/BATHING: DRESSING DIFFICULTY, ASSISTANCE 1 PERSON
ADLS_ACUITY_SCORE: 32
CHANGE_IN_FUNCTIONAL_STATUS_SINCE_ONSET_OF_CURRENT_ILLNESS/INJURY: NO
DOING_ERRANDS_INDEPENDENTLY_DIFFICULTY: YES
ADLS_ACUITY_SCORE: 32
TRANSFERRING: 0-->INDEPENDENT
FALL_HISTORY_WITHIN_LAST_SIX_MONTHS: NO
WALKING_OR_CLIMBING_STAIRS_DIFFICULTY: YES
WALKING_OR_CLIMBING_STAIRS: AMBULATION DIFFICULTY, REQUIRES EQUIPMENT;STAIR CLIMBING DIFFICULTY, REQUIRES EQUIPMENT
ADLS_ACUITY_SCORE: 37
DIFFICULTY_COMMUNICATING: NO
TRANSFERRING: 0-->INDEPENDENT
DRESS: 1-->ASSISTANCE (EQUIPMENT/PERSON) NEEDED
HEARING_DIFFICULTY_OR_DEAF: NO
VISION_MANAGEMENT: GLASSES
WEAR_GLASSES_OR_BLIND: YES

## 2023-10-03 NOTE — PROVIDER NOTIFICATION
notified of runs of bigeminy and increasing ectopy. Checked on patient and was asymptomatic and BP was stable. Provider to order EKG. Primary nurse updated.

## 2023-10-03 NOTE — MEDICATION SCRIBE - ADMISSION MEDICATION HISTORY
Medication Scribe Admission Medication History    Admission medication history is complete. The information provided in this note is only as accurate as the sources available at the time of the update.    Medication reconciliation/reorder completed by provider prior to medication history? Yes    Information Source(s): Patient, Family member, and CareEverywhere/SureScripts via in-person and phone    Pertinent Information:   Patient manages medications and daughter assists. Medications last filled in [place]-pharmacy not open at this time.     Changes made to PTA medication list:  Added: PRN dulcolax, nifedipine, lidocaine  Deleted: None  Changed: None    Medication Affordability: did not assess     Allergies reviewed with patient and updates made in EHR: yes    Medication History Completed By: Monica Pradhan 10/3/2023 10:38 AM    Prior to Admission medications    Medication Sig Last Dose Taking? Auth Provider Long Term End Date   acetaminophen (TYLENOL) 500 MG tablet Take 500 mg by mouth every 8 hours as needed for pain . Do not exceed 4000mg a day of acetaminophen from any source. Past Week Yes Reported, Patient  8/17/25   aspirin 81 MG EC tablet Take 81 mg by mouth daily 10/2/2023 at AM Yes Reported, Patient  3/2/25   bisacodyl (DULCOLAX) 5 MG EC tablet Take 2 tablets by mouth daily as needed for constipation Unknown Yes Reported, Patient     carvedilol (COREG) 6.25 MG tablet Take 0.5 tablets (3.125 mg) by mouth 2 times daily (with meals) 10/2/2023 at AM/HS Yes Nikita Hutchinson MD Yes    colchicine (COLCRYS) 0.6 MG tablet 2 tablets at onset of gout and may repeat in 1 hour to improve gout at onset and may take 1 tablet daily for 5 more days if gout persists Unknown Yes Nikita Hutchinson MD     furosemide (LASIX) 20 MG tablet Take 1 tablet (20 mg) by mouth daily 10/2/2023 at AM Yes Nikita Hutchinson MD Yes    lidocaine (XYLOCAINE) 5 % external ointment Apply topically as needed (hemorrhoids) More than a month Yes  Reported, Patient No    lisinopril (ZESTRIL) 10 MG tablet Take 1 tablet (10 mg) by mouth daily  Patient taking differently: Take 10 mg by mouth At Bedtime 10/1/2023 at HS Yes Nikita Hutchinson MD Yes    nifedipine 0.2% in white petrolatum 0.2 % OINT ointment Place rectally as needed (hemorrhoids) More than a month Yes Reported, Patient     predniSONE (DELTASONE) 10 MG tablet 4 tabs orally for 4 days then 2 tabs orally for 4 days then 1 tablet orally for 4 days Unknown Yes Nikita Hutchinson MD

## 2023-10-03 NOTE — PHARMACY-MEDICATION REGIMEN REVIEW
[facility]    Pharmacy      Antimicrobial Stewardship Note     Current antimicrobial therapy:            Culture Results:         Recommendations/Interventions:  1. I renally adjusted ciprofloxacin dosing.  2. Await culture results.      Eugenia Spencer [facility]  October 3, 2023

## 2023-10-03 NOTE — PROGRESS NOTES
"INPATIENT ROUNDING NOTE  10/3/2023    Patient: Any Selby    Physician of Record: Stu Francisco MD    Admitting diagnosis: Perianal abscess [K61.0]  Perirectal abscess [K61.1]    Procedure(s):  INCISION AND DRAINAGE, LEFT PERIRECTAL ABSCESS, COMPLEX     POD: 1 Day Post-Op    Current Diet: Regular    CURRENT MEDICATIONS:  Continuous Medications:  Current Facility-Administered Medications   Medication Last Rate    lactated ringers 75 mL/hr at 10/03/23 0806       Scheduled Medications:  Current Facility-Administered Medications   Medication Dose Route Frequency    carvedilol  3.125 mg Oral BID w/meals    ciprofloxacin  400 mg Intravenous Q12H    metroNIDAZOLE  500 mg Intravenous Q12H    sodium chloride (PF)  3 mL Intracatheter Q8H       PRN Medications:  Current Facility-Administered Medications   Medication Dose Route Frequency    HYDROmorphone  0.2 mg Intravenous Q2H PRN    Or    HYDROmorphone  0.4 mg Intravenous Q2H PRN    lidocaine 4%   Topical Q1H PRN    lidocaine (buffered or not buffered)  0.1-1 mL Other Q1H PRN    naloxone  0.2 mg Intravenous Q2 Min PRN    Or    naloxone  0.4 mg Intravenous Q2 Min PRN    Or    naloxone  0.2 mg Intramuscular Q2 Min PRN    Or    naloxone  0.4 mg Intramuscular Q2 Min PRN    ondansetron  4 mg Oral Q6H PRN    Or    ondansetron  4 mg Intravenous Q6H PRN    oxyCODONE  5 mg Oral Q4H PRN    Or    oxyCODONE  10 mg Oral Q4H PRN    prochlorperazine  5 mg Intravenous Q6H PRN    Or    prochlorperazine  5 mg Oral Q6H PRN    sodium chloride (PF)  3 mL Intracatheter q1 min prn       SUBJECTIVE:   Nausea: No. Vomiting: No. Fever: No. Chills: No. Excessive burping: No. Pain control: good. Tolerating current diet: Yes.      PHYSICAL EXAM:   Vital signs: BP (!) 112/48 (BP Location: Right arm, Patient Position: Supine, Cuff Size: Adult Regular)   Pulse 76   Temp 98  F (36.7  C) (Tympanic)   Resp 18   Ht 1.549 m (5' 1\")   Wt 81.6 kg (179 lb 14.3 oz)   SpO2 94%   BMI 33.99 kg/m  "    BMI: Body mass index is 33.99 kg/m .   General: Normal, healthy, cooperative, in no acute distress, alert   Lungs: respirations are non-labored   Abdominal: non--distended   Wound: granular without signs/symptoms of infection   Extremities: No cyanosis, clubbing or edema noted bilaterally in Upper and Lower Extremities   Neurological: without deficit    INPUT/OUTPUT:      Intake/Output Summary (Last 24 hours) at 10/3/2023 0920  Last data filed at 10/3/2023 0531  Gross per 24 hour   Intake 1208 ml   Output 600 ml   Net 608 ml       I/O last 3 completed shifts:  In: 1208 [P.O.:240; I.V.:968]  Out: 600 [Urine:600]    LABS:    Last CBC Rrsults:   Recent Labs   Lab Test 10/03/23  0507 10/02/23  2116 10/02/23  1248   WBC 17.1* 21.7* 20.7*   RBC 2.98* 3.30* 3.28*   HGB 9.8* 10.7* 10.8*   HCT 30.1* 33.1* 33.2*   * 100 101*   MCH 32.9 32.4 32.9   MCHC 32.6 32.3 32.5   RDW 13.0 13.2 13.2    268 305       Last Comprehensive Metabolic panel:  Recent Labs   Lab Test 10/03/23  0542 10/03/23  0507 10/02/23  1248 09/26/23  1254   NA  --  136 139 140   POTASSIUM  --  4.5 4.3 4.8   CHLORIDE  --  103 104 103   CO2  --  20* 23 23   ANIONGAP  --  13 12 14   * 145* 110* 106*   BUN  --  35.0* 38.1* 39.1*   CR  --  1.44* 1.60* 1.38*   GFRESTIMATED  --  38* 33* 40*   GEORGE  --  8.5* 8.8 9.4   BILITOTAL  --   --   --  0.7   ALKPHOS  --   --   --  110*   ALT  --   --   --  <5   AST  --   --   --  14       Recent Labs   Lab Test 10/02/23  2116 09/26/23  1254   MAG 2.0  --    ALBUMIN  --  4.3       ASSESSMENT:    1 Day Post-Op from Procedure(s):  INCISION AND DRAINAGE, LEFT PERIRECTAL ABSCESS, COMPLEX.      PLAN:  Packing was removed from the incisional site without problems  Sitz baths recommended and keeping incisional site clean and dry

## 2023-10-03 NOTE — PROGRESS NOTES
"[facility]    Medicine Progress Note - Hospitalist Service    Date of Admission:  10/2/2023    Assessment & Plan   Any Selby is a 77 year old female admitted on 10/2/2023. She underwent incision and drainage of left perianal abscess. Hospital Medicine consulted for medical management of her chronic medical problems. She has systolic CHF  last LVEF is 31-40% last year in [place]. Her BNP is up and we are repeating ECHO here.     Hospital problems:     Perirectal abscess  S/p I and D with Dr Francisco on 10/2  Sepsis with hypotension  Leukocytosis  -Monitor cultures  -On cipro and flagyl  -hold home bp meds for now but will re-eval on 10/3    H/o systolic heart failure.   - home asix and lisnipril will be restarted on 10/3 once cr is back   -decrease iv fluids     2. Acute on chronic kidney disease stage 3a      -monitor cr  -hold nephrotic meds for now   -ua pending     3. Frequent PVC  -may need B-blocker  -monitor lytes   -tele for now likely 2nd from sepsis     4. ARNEL  - needs out patient sleep study    5. Gout  - monitor no flare at this time     6. Chronic anemia  -anemia work up   -monitor for bleeding        Diet: Clear Liquid Diet    DVT Prophylaxis: Defer to primary service  Barnard Catheter: Not present  Lines: None     Cardiac Monitoring: ACTIVE order. Indication: frequent PVC, h/o HFrEF  Code Status: Full Code      Clinically Significant Risk Factors Present on Admission                # Drug Induced Platelet Defect: home medication list includes an antiplatelet medication   # Hypertension: Home medication list includes antihypertensive(s)      # Obesity: Estimated body mass index is 33.99 kg/m  as calculated from the following:    Height as of this encounter: 1.549 m (5' 1\").    Weight as of this encounter: 81.6 kg (179 lb 14.3 oz).              Disposition Plan      Expected Discharge Date: 10/06/2023                  Jeramie Castelan, DO  Hospitalist Service  [facility]  Securely message with [employer] (more info)  Text page via [facility] Paging/Directory   ______________________________________________________________________    Interval History   Patient was seen this morning for medical rounds. No chest pain or shortness of breath. Underwent surgery yesterday. RN mentioned there was bradycardia overnight but nothing was documented. I did get EKG this morning.     Physical Exam   Vital Signs: Temp: 98  F (36.7  C) Temp src: Tympanic BP: 100/52 Pulse: 67   Resp: 18 SpO2: 95 % O2 Device: None (Room air)    Weight: 179 lbs 14.33 oz    Physical Exam  Constitutional:       Comments: Frail appearing stated female    HENT:      Right Ear: External ear normal.      Left Ear: External ear normal.      Nose: Nose normal.      Mouth/Throat:      Pharynx: Oropharynx is clear.   Cardiovascular:      Rate and Rhythm: Normal rate.   Pulmonary:      Effort: Pulmonary effort is normal.   Abdominal:      General: Abdomen is flat.   Musculoskeletal:         General: No swelling.   Skin:     Coloration: Skin is not jaundiced.   Neurological:      Mental Status: She is alert. Mental status is at baseline.         Medical Decision Making       46 MINUTES SPENT BY ME on the date of service doing chart review, history, exam, documentation & further activities per the note.      Data     I have personally reviewed the following data over the past 24 hrs:    17.1 (H)  \   9.8 (L)   / 260     139 104 38.1 (H) /  143 (H)   4.3 23 1.60 (H) \     Trop: N/A BNP: 5,092 (H)     TSH: 1.15 T4: N/A A1C: N/A     Procal: 0.31 (H) CRP: N/A Lactic Acid: 1.0         Imaging results reviewed over the past 24 hrs:   Recent Results (from the past 24 hour(s))   CT Abdomen Pelvis w Contrast    Narrative    Exam: CT ABDOMEN PELVIS W CONTRAST    Exam reason: left perineal mass    Technique: Using helical CT technique, axial images of the abdomen and  pelvis were obtained with IV contrast.  Coronal and  sagittal  reconstructions also performed. This CT was performed using one or  more of the following dose reduction techniques: automated exposure  control, adjustment of the mA and/or kV according to patient size,  and/or use of iterative reconstruction technique.    Meds/Contrast: Isovue 370 86ml Given    Comparison: None.     FINDINGS:    ABDOMEN:    Liver: No mass or any significant abnormality.  Gallbladder: No calcified gallstones.   Bile Ducts: No biliary ductal dilation.   Spleen:  No splenomegaly or focal lesion.  Pancreas: No mass, ductal dilatation, or inflammatory changes.  Kidneys: No solid mass, hydronephrosis, or any definite calculi.   Adrenals:  No nodules.   Lymph Nodes: No adenopathy.   Vascular: No aortic aneurysm.   Abdominal Wall: No acute findings.     PELVIS:   No mass or adenopathy.     There is a rim-enhancing fluid collection with a few foci of air in  the subcutaneous tissues of the left buttocks, to the left of the  anus. This measures 6.8 x 5.3 x 4.2 cm (series 2 image 87 and series 4  image 64). This is most compatible with a perianal abscess. There is  no discrete supralevator extension. There is stranding of the  surrounding fat.    Bowel/Mesentery/Peritoneum:   -No bowel obstruction.   -Normal appendix.  -No ascites.    Visualized portions of the Chest: No consolidation or pleural  effusion. No acute osseous abnormalities. There are scattered  degenerative changes of the visualized spine.  Musculoskeletal: No acute findings.       Impression    IMPRESSION:  Rim-enhancing fluid collection with a few foci of air in the  subcutaneous tissues of the left buttocks adjacent to the left aspect  of the anus measuring up to 6.8 cm, most compatible with a perianal  abscess. There is no discrete supralevator extension.    CATHY LIN MD         SYSTEM ID:  [id number]

## 2023-10-03 NOTE — PROGRESS NOTES
Assessment completed by face to face visit with patient.    LOC: alert & oriented x 4.     Dx: Perirectal abscess  Chronic Disease Management: CKD, CHF, Gout, Hyperlipidemia, Osteoarthritis of both knees, DDD- lumbar    Lives with: daughter    Primary PCP: Nikita Hutchinson  Insurance:  [employer], Medicare    Support System:  Daughter  Homecare/PCA: No  /County Services:   No    Guardian: No  POA: No    Adequate Resources for needs (housing, utilities, food/med): YES    ADLs: Independent  Ambulation:Independent  Falls: Denies  Nutrition: Denies concerns  Sleep: Denies concerns    Able to Return to Prior Living Arrangements: YES    Choice of Vendor: N/A    Barriers: None noted    Plan: Anticipate discharge to home via private transportation.

## 2023-10-03 NOTE — PLAN OF CARE
Pt is A&O, afebrile, vs and assessments as charted. Rating pain at 8/10 to yanna area. 5 mg oxycodone given per orders with adequate relief. Packing remains in place to surgical opening. ABD in place with yanna ice packs applied. Up ambulating, voiding, and drinking w/o difficulty. Pt did have some trouble staying asleep t/o night. Cares were clustered to promote sleep with some success. IVF infusing per orders. Flagyl given x1 this shift. A1 with walker. POD 1 . Bed is locked and low, call light within reach, pt is able to make needs known.         Face to face report given with opportunity to observe patient.    Report given to JAMES De La Torre RN   10/3/2023  7:06 AM

## 2023-10-03 NOTE — PROVIDER NOTIFICATION
Dr. CARRERO notified of patient HR dropping into the 30's and frequent ectopy. EKG to be ordered.

## 2023-10-03 NOTE — PLAN OF CARE
"Goal Outcome Evaluation:  [facility] Inpatient Admission Note:    Patient admitted to 3114/3114-1 at approximately 1802 via bed accompanied by nurse from surgery . Report received from JAMES Varghese \"surgical\" in SBAR format at bedside via face to face in room. Patient transferred to bed via slide board.. Patient is alert and oriented X 3, reports pain; rates at 3 on 0-10 scale.  Patient oriented to room, unit, hourly rounding, and plan of care. Explained admission packet and patient handbook with patient bill of rights brochure. Will continue to monitor and document as needed.     Inpatient Nursing criteria listed below was met:    Health care directives status obtained and documented: No    Patient identifies a surrogate decision maker: No If yes, who: N/A Contact Information:N/A     If initial lactic acid greater than 2.0, repeat lactic acid drawn within one hour of arrival to unit: NA. If no, state reason: WDL    Clergy visit ordered if patient requests: No    Skin issues/needs documented: Yes    Isolation Patient: no Education given, correct sign in place and documentation row added to PCS:   N/A    Fall Prevention Yes: Care plan updated, education given and documented, sticker and magnet in place: Yes    Care Plan initiated: Yes    Education Documented (including assessment): Yes    Patient has discharge needs : No If yes, please explain:N/A                       "

## 2023-10-04 LAB
ANION GAP SERPL CALCULATED.3IONS-SCNC: 12 MMOL/L (ref 7–15)
BASO+EOS+MONOS # BLD AUTO: ABNORMAL 10*3/UL
BASO+EOS+MONOS NFR BLD AUTO: ABNORMAL %
BASOPHILS # BLD AUTO: 0 10E3/UL (ref 0–0.2)
BASOPHILS NFR BLD AUTO: 0 %
BUN SERPL-MCNC: 45.2 MG/DL (ref 8–23)
CALCIUM SERPL-MCNC: 8.3 MG/DL (ref 8.8–10.2)
CHLORIDE SERPL-SCNC: 108 MMOL/L (ref 98–107)
CREAT SERPL-MCNC: 1.7 MG/DL (ref 0.51–0.95)
DEPRECATED HCO3 PLAS-SCNC: 21 MMOL/L (ref 22–29)
EGFRCR SERPLBLD CKD-EPI 2021: 31 ML/MIN/1.73M2
EOSINOPHIL # BLD AUTO: 0 10E3/UL (ref 0–0.7)
EOSINOPHIL NFR BLD AUTO: 0 %
ERYTHROCYTE [DISTWIDTH] IN BLOOD BY AUTOMATED COUNT: 13.3 % (ref 10–15)
GLUCOSE SERPL-MCNC: 129 MG/DL (ref 70–99)
HCT VFR BLD AUTO: 28.3 % (ref 35–47)
HGB BLD-MCNC: 9.3 G/DL (ref 11.7–15.7)
IMM GRANULOCYTES # BLD: 0.1 10E3/UL
IMM GRANULOCYTES NFR BLD: 1 %
LYMPHOCYTES # BLD AUTO: 1.7 10E3/UL (ref 0.8–5.3)
LYMPHOCYTES NFR BLD AUTO: 9 %
MCH RBC QN AUTO: 32.7 PG (ref 26.5–33)
MCHC RBC AUTO-ENTMCNC: 32.9 G/DL (ref 31.5–36.5)
MCV RBC AUTO: 100 FL (ref 78–100)
MONOCYTES # BLD AUTO: 1 10E3/UL (ref 0–1.3)
MONOCYTES NFR BLD AUTO: 5 %
NEUTROPHILS # BLD AUTO: 15.5 10E3/UL (ref 1.6–8.3)
NEUTROPHILS NFR BLD AUTO: 85 %
NRBC # BLD AUTO: 0 10E3/UL
NRBC BLD AUTO-RTO: 0 /100
PLATELET # BLD AUTO: 276 10E3/UL (ref 150–450)
POTASSIUM SERPL-SCNC: 4.3 MMOL/L (ref 3.4–5.3)
PROCALCITONIN SERPL IA-MCNC: 0.15 NG/ML
RBC # BLD AUTO: 2.84 10E6/UL (ref 3.8–5.2)
SODIUM SERPL-SCNC: 141 MMOL/L (ref 135–145)
WBC # BLD AUTO: 18.3 10E3/UL (ref 4–11)

## 2023-10-04 PROCEDURE — 36415 COLL VENOUS BLD VENIPUNCTURE: CPT | Performed by: HOSPITALIST

## 2023-10-04 PROCEDURE — 99233 SBSQ HOSP IP/OBS HIGH 50: CPT | Performed by: HOSPITALIST

## 2023-10-04 PROCEDURE — 250N000013 HC RX MED GY IP 250 OP 250 PS 637: Performed by: SURGERY

## 2023-10-04 PROCEDURE — 120N000001 HC R&B MED SURG/OB

## 2023-10-04 PROCEDURE — 80048 BASIC METABOLIC PNL TOTAL CA: CPT | Performed by: HOSPITALIST

## 2023-10-04 PROCEDURE — 250N000011 HC RX IP 250 OP 636: Mod: JZ | Performed by: SURGERY

## 2023-10-04 PROCEDURE — 85025 COMPLETE CBC W/AUTO DIFF WBC: CPT | Performed by: HOSPITALIST

## 2023-10-04 PROCEDURE — 999N000157 HC STATISTIC RCP TIME EA 10 MIN

## 2023-10-04 PROCEDURE — 84145 PROCALCITONIN (PCT): CPT | Performed by: HOSPITALIST

## 2023-10-04 PROCEDURE — 250N000013 HC RX MED GY IP 250 OP 250 PS 637: Performed by: HOSPITALIST

## 2023-10-04 PROCEDURE — 250N000011 HC RX IP 250 OP 636: Mod: JZ | Performed by: HOSPITALIST

## 2023-10-04 RX ORDER — CIPROFLOXACIN 2 MG/ML
400 INJECTION, SOLUTION INTRAVENOUS EVERY 24 HOURS
Status: DISCONTINUED | OUTPATIENT
Start: 2023-10-05 | End: 2023-10-05

## 2023-10-04 RX ORDER — LISINOPRIL 10 MG/1
10 TABLET ORAL DAILY
Qty: 90 TABLET | Refills: 3 | COMMUNITY
Start: 2023-10-11 | End: 2023-10-10

## 2023-10-04 RX ORDER — METRONIDAZOLE 500 MG/1
500 TABLET ORAL 3 TIMES DAILY
Status: DISCONTINUED | OUTPATIENT
Start: 2023-10-04 | End: 2023-10-04

## 2023-10-04 RX ORDER — METRONIDAZOLE 500 MG/100ML
500 INJECTION, SOLUTION INTRAVENOUS EVERY 12 HOURS
Status: DISCONTINUED | OUTPATIENT
Start: 2023-10-04 | End: 2023-10-05 | Stop reason: HOSPADM

## 2023-10-04 RX ORDER — FUROSEMIDE 20 MG
20 TABLET ORAL DAILY
Status: DISCONTINUED | OUTPATIENT
Start: 2023-10-05 | End: 2023-10-05 | Stop reason: HOSPADM

## 2023-10-04 RX ORDER — CIPROFLOXACIN 500 MG/1
500 TABLET, FILM COATED ORAL
Status: DISCONTINUED | OUTPATIENT
Start: 2023-10-05 | End: 2023-10-04

## 2023-10-04 RX ADMIN — BISACODYL 10 MG: 5 TABLET, COATED ORAL at 14:20

## 2023-10-04 RX ADMIN — CARVEDILOL 3.12 MG: 3.12 TABLET, FILM COATED ORAL at 08:00

## 2023-10-04 RX ADMIN — METRONIDAZOLE 500 MG: 500 INJECTION, SOLUTION INTRAVENOUS at 03:29

## 2023-10-04 RX ADMIN — METRONIDAZOLE 500 MG: 500 TABLET ORAL at 14:20

## 2023-10-04 RX ADMIN — METRONIDAZOLE 500 MG: 500 INJECTION, SOLUTION INTRAVENOUS at 21:32

## 2023-10-04 RX ADMIN — CARVEDILOL 3.12 MG: 3.12 TABLET, FILM COATED ORAL at 17:32

## 2023-10-04 RX ADMIN — CIPROFLOXACIN 400 MG: 400 INJECTION, SOLUTION INTRAVENOUS at 04:38

## 2023-10-04 ASSESSMENT — ACTIVITIES OF DAILY LIVING (ADL)
ADLS_ACUITY_SCORE: 31
ADLS_ACUITY_SCORE: 33
ADLS_ACUITY_SCORE: 32
ADLS_ACUITY_SCORE: 31
ADLS_ACUITY_SCORE: 30
ADLS_ACUITY_SCORE: 31
ADLS_ACUITY_SCORE: 33
ADLS_ACUITY_SCORE: 32
ADLS_ACUITY_SCORE: 30
ADLS_ACUITY_SCORE: 31

## 2023-10-04 NOTE — PLAN OF CARE
Vitals as charted. Denies pain. Up in chair for all meals. No drainage. Dulcolax given for constipation. IV SL. Call light within reach, use of call light appropriately, makes needs known. Pt will get up on her own if she has to use the restroom & no one comes to answer her light in a timely fashion.   Face to face report given with opportunity to observe patient.    Report given to Sanya NICHOLS RN & Susan NICHOLS RN.     Veronica Spangler RN   10/3/2023  7:59 PM

## 2023-10-04 NOTE — PROGRESS NOTES
[facility]    Medicine Progress Note - Hospitalist Service    Date of Admission:  10/2/2023    Assessment & Plan   Any Selby is a 77 year old female admitted on 10/2/2023. She underwent incision and drainage of left perianal abscess. Hospital Medicine consulted for medical management of her chronic medical problems. She has systolic CHF  last LVEF is 31-40% last year in [place]. Her BNP was elevated, ECHO done LVEF seemed improved from prior and was The ejection fraction is 45-50% . She did not have chest pain or shortness of breath . She did have PVC's that were frequent in hospital. EKG was done no acute St Changes. She has seen cardiology prior and can follow up out patient as asymptomatic. She also was on Cipro and  flagyl as per surgery, blood cultures NGTD, wound culture pending. She was feeling better on 10/4 her WBC was slightly elevated na surgery wanted to keep her one more day,. I did speak with her PCP Dr Hutchinson  and plan was  n out patient follow up on Monday and repeat draw of labs on Friday as she had slight cr elevation and will need to restart her lisinopril with dr Hutchinson  on follow up. We did restart lasix starting tomorrow.   Hospital medicine will sign off, please re-consult if needed. Orders placed for follow up.    Hospital problems:     Perirectal abscess  S/p I and D with Dr Francisco on 10/2  Sepsis with hypotension   Leukocytosis  -Monitor cultures  -On cipro and flagyl   -management as per surgery     H/o systolic heart failure.   -off  iv fluids   -Held home lisinopril as cr elevated and 's sytolic on 10/4   -home coreg and lasix ok    2. Acute on chronic kidney disease stage 3a      -monitor cr ( ok to get BMP out patient)   -hold nephrotic meds for now   -ua done NGTD     3. Frequent PVC resolved   -on coreg   -monitor lytes   -ok to be off tele     4. ARNEL  - needs out patient sleep study    5. Gout  - monitor no flare at this time     6. Chronic  "anemia  -anemia work up out patient   -monitor for bleeding        Diet: Regular Diet Adult    DVT Prophylaxis: Defer to primary service  Barnard Catheter: Not present  Lines: None     Cardiac Monitoring: None  Code Status: Full Code      Clinically Significant Risk Factors          # Hypocalcemia: Lowest Ca = 8.3 mg/dL in last 2 days, will monitor and replace as appropriate     # Hypoalbuminemia: Lowest albumin = 3.3 g/dL at 10/3/2023  3:47 PM, will monitor as appropriate      # Heart failure, NOS: heart failure noted on the problem list and last echo with EF 40-50%       # Obesity: Estimated body mass index is 33.99 kg/m  as calculated from the following:    Height as of this encounter: 1.549 m (5' 1\").    Weight as of this encounter: 81.6 kg (179 lb 14.3 oz)., PRESENT ON ADMISSION            Disposition Plan     Expected Discharge Date: 10/06/2023      Destination: home with family            Phoeberafaela CARREROMauricio Castelan DO  Hospitalist Service  [facility]  Securely message with [employer] (more info)  Text page via [facility] Paging/Directory   ______________________________________________________________________    Interval History    Patient was seen this morning for medical rounds. No chest   pain or shortness of breath, patient was seen  with her daughter,. We discussed plan. Surgery was evaluating her and her wbc was slightly elevated. I did call her PCP and we discussed plan on repeating labs  on Friday and she will be seen on Monday and holding her lisinopril as cr elevation. We will restart lasix tomorrow. Dr Hutchinson  was ok with plan. Patient and daughter were ok with plan. From hospitalitis stand point she can be discharged. Surgery was going to make final decision as she is a surgical patient.     Physical Exam   Vital Signs: Temp: 98.6  F (37  C) Temp src: Tympanic BP: 117/61 Pulse: 69   Resp: 16 SpO2: 95 % O2 Device: None (Room air)    Weight: 179 lbs 14.33 oz    Physical Exam  Constitutional:       " Comments: Frail appearing stated age female    HENT:      Right Ear: External ear normal.      Left Ear: External ear normal.      Mouth/Throat:      Pharynx: Oropharynx is clear.   Cardiovascular:      Rate and Rhythm: Normal rate.   Pulmonary:      Effort: Pulmonary effort is normal.   Abdominal:      General: Abdomen is flat.   Musculoskeletal:         General: No swelling.   Skin:     Coloration: Skin is not jaundiced.   Neurological:      Mental Status: She is alert. Mental status is at baseline.           Medical Decision Making       50 MINUTES SPENT BY ME on the date of service doing chart review, history, exam, documentation & further activities per the note.      Data     I have personally reviewed the following data over the past 24 hrs:    18.3 (H)  \   9.3 (L)   / 276     141 108 (H) 45.2 (H) /  129 (H)   4.3 21 (L) 1.70 (H) \     ALT: 5 AST: 11 AP: 83 TBILI: 0.4   ALB: 3.3 (L) TOT PROTEIN: 6.7 LIPASE: N/A     Procal: 0.15 (H) CRP: N/A Lactic Acid: N/A         Imaging results reviewed over the past 24 hrs:   No results found for this or any previous visit (from the past 24 hour(s)).

## 2023-10-04 NOTE — PROGRESS NOTES
I did speak with Dr Francisco regarding antibiotics  She did have antibiotics changed to IV as bioavailability and her renal function required dose adjustment to once a day for Cipro.   She did have mild erythema of left arm and IV was removed for possible infiltration as that can cause mild WBC elevation.   Dr Francisco would like IV antibiotics tonight and re-eval WBC in am.   Surgery will continue to management   I spoke with Isabelle pharmacist and she will enter the IV medications as appropriate for renal dosing.

## 2023-10-04 NOTE — PROGRESS NOTES
"INPATIENT ROUNDING NOTE  10/4/2023    Patient: Any Selby    Physician of Record: Stu Francisco MD    Admitting diagnosis: Perianal abscess [K61.0]  Perirectal abscess [K61.1]    Procedure(s):  INCISION AND DRAINAGE, LEFT PERIRECTAL ABSCESS, COMPLEX     POD: 2 Days Post-Op    Current Diet: Regular    CURRENT MEDICATIONS:  Continuous Medications:  Current Facility-Administered Medications   Medication Last Rate       Scheduled Medications:  Current Facility-Administered Medications   Medication Dose Route Frequency    carvedilol  3.125 mg Oral BID w/meals    ciprofloxacin  400 mg Intravenous Q12H    metroNIDAZOLE  500 mg Intravenous Q12H    sodium chloride (PF)  3 mL Intracatheter Q8H       PRN Medications:  Current Facility-Administered Medications   Medication Dose Route Frequency    bisacodyl  10 mg Oral Daily PRN    HYDROmorphone  0.2 mg Intravenous Q2H PRN    Or    HYDROmorphone  0.4 mg Intravenous Q2H PRN    lidocaine 4%   Topical Q1H PRN    lidocaine (buffered or not buffered)  0.1-1 mL Other Q1H PRN    naloxone  0.2 mg Intravenous Q2 Min PRN    Or    naloxone  0.4 mg Intravenous Q2 Min PRN    Or    naloxone  0.2 mg Intramuscular Q2 Min PRN    Or    naloxone  0.4 mg Intramuscular Q2 Min PRN    ondansetron  4 mg Oral Q6H PRN    Or    ondansetron  4 mg Intravenous Q6H PRN    oxyCODONE  5 mg Oral Q4H PRN    Or    oxyCODONE  10 mg Oral Q4H PRN    prochlorperazine  5 mg Intravenous Q6H PRN    Or    prochlorperazine  5 mg Oral Q6H PRN    sodium chloride (PF)  3 mL Intracatheter q1 min prn       SUBJECTIVE:   Nausea: No. Vomiting: No. Fever: No. Chills: No. Excessive burping: No. ain control: good. Tolerating current diet: Yes.      PHYSICAL EXAM:   Vital signs: /61   Pulse 65   Temp 97  F (36.1  C) (Tympanic)   Resp 18   Ht 1.549 m (5' 1\")   Wt 81.6 kg (179 lb 14.3 oz)   SpO2 94%   BMI 33.99 kg/m     BMI: Body mass index is 33.99 kg/m .   General: Normal, healthy, cooperative, in no acute " distress, alert   Lungs: respirations are non-labored   Abdominal: non-distended   Wound:  granular incisional site noted without erythema or redness   Extremities: No cyanosis, clubbing or edema noted bilaterally in Upper and Lower Extremities   Neurological: without deficit    INPUT/OUTPUT:      Intake/Output Summary (Last 24 hours) at 10/4/2023 0834  Last data filed at 10/4/2023 0600  Gross per 24 hour   Intake 1637 ml   Output --   Net 1637 ml       I/O last 3 completed shifts:  In: 1637 [P.O.:600; I.V.:1037]  Out: -     LABS:    Last CBC Rrsults:   Recent Labs   Lab Test 10/04/23  0518 10/03/23  1547 10/03/23  0507 10/02/23  2116   WBC 18.3*  --  17.1* 21.7*   RBC 2.84*  --  2.98* 3.30*   HGB 9.3* 10.2* 9.8* 10.7*   HCT 28.3*  --  30.1* 33.1*     --  101* 100   MCH 32.7  --  32.9 32.4   MCHC 32.9  --  32.6 32.3   RDW 13.3  --  13.0 13.2     --  260 268       Last Comprehensive Metabolic panel:  Recent Labs   Lab Test 10/04/23  0518 10/03/23  1547 10/03/23  0542 10/03/23  0507 10/02/23  1248 09/26/23  1254     --   --  136 139 140   POTASSIUM 4.3  --   --  4.5 4.3 4.8   CHLORIDE 108*  --   --  103 104 103   CO2 21*  --   --  20* 23 23   ANIONGAP 12  --   --  13 12 14   *  --  143* 145* 110* 106*   BUN 45.2*  --   --  35.0* 38.1* 39.1*   CR 1.70*  --   --  1.44* 1.60* 1.38*   GFRESTIMATED 31*  --   --  38* 33* 40*   GEORGE 8.3*  --   --  8.5* 8.8 9.4   BILITOTAL  --  0.4  --   --   --  0.7   ALKPHOS  --  83  --   --   --  110*   ALT  --  5  --   --   --  <5   AST  --  11  --   --   --  14       Recent Labs   Lab Test 10/03/23  1547 10/02/23  2116 09/26/23  1254   MAG  --  2.0  --    ALBUMIN 3.3*  --  4.3       ASSESSMENT:    2 Days Post-Op from Procedure(s):  INCISION AND DRAINAGE, LEFT PERIRECTAL ABSCESS, COMPLEX.      PLAN:   Continue current wound care  Continue IV antibiotics  Continue to monitor from a surgical standpoint at this time.

## 2023-10-04 NOTE — PLAN OF CARE
"Reason for admission: Perirectal abscess  Pt is Aox4 forgetful at times. SBA with gait belt and walker. BR voiding. Makes needs known, Call light within reach, wheels locked, ID band on. Denies pain throughout shift. IV SL, ABX IV Cipro, flagyl  Pt rinsing abscess with warm water using yanna bottle after voiding.    /57 (BP Location: Right arm, Patient Position: Semi-Hernandez's, Cuff Size: Adult Large)   Pulse 70   Temp 98.6  F (37  C) (Tympanic)   Resp 16   Ht 1.549 m (5' 1\")   Wt 81.6 kg (179 lb 14.3 oz)   SpO2 96%   BMI 33.99 kg/m    Face to face report given with opportunity to observe patient.    Report given to Annie Stewart RN on 10/4/2023 at 7:12 PM         "

## 2023-10-04 NOTE — PLAN OF CARE
"/53 (BP Location: Right arm, Cuff Size: Adult Large)   Pulse 72   Temp 97  F (36.1  C) (Tympanic)   Resp 18   Ht 1.549 m (5' 1\")   Wt 81.6 kg (179 lb 14.3 oz)   SpO2 94%   BMI 33.99 kg/m          A&O x4. Forgetful at times. Sinus rhythm 70s w frequent PVCs per tele. Reports 4/10 pain in perirectal region- cold pack placed w relief per pt. Perirectal incision open to air on left buttock- scant serosanguinous drainage, rinsed w yanna bottle after voiding. PRN dulcolax given per pt hx of constipation- no results this shift.  IV Flagyl & Cipro cont. SBA w walker, alarms in place and room near unit.      Face to face report given with opportunity to observe patient.    Report given to Letty HONG RN & JAMES Katz RN   10/4/2023  7:04 AM            "

## 2023-10-04 NOTE — PHARMACY-MEDICATION REGIMEN REVIEW
Pharmacy Antimicrobial Stewardship Assessment     Current Antimicrobial Therapy:  Anti-infectives (From now, onward)      Start     Dose/Rate Route Frequency Ordered Stop    10/05/23 0900  ciprofloxacin (CIPRO) tablet 500 mg         500 mg Oral EVERY 24 HOURS SCHEDULED 10/04/23 1146      10/04/23 1500  metroNIDAZOLE (FLAGYL) tablet 500 mg         500 mg Oral 3 TIMES DAILY 10/04/23 1144              Indication: Abscess    Days of Therapy: 3     Pertinent Labs:  Recent Labs   Lab Test 10/04/23  0518 10/03/23  0507 10/02/23  2116   WBC 18.3* 17.1* 21.7*     Recent Labs   Lab Test 10/04/23  0518 10/02/23  1325 10/02/23  1248   LACT  --  1.0  --    PCAL 0.15*  --  0.31*        Temperature:  Temp (24hrs), Av  F (36.7  C), Min:97  F (36.1  C), Max:98.6  F (37  C)    Culture Results:   7-Day Micro Results    Collected Updated Procedure Result Status    10/03/2023 1111 10/04/2023 0727 Urine Culture [[id number]]   Urine, Clean Catch    Preliminary result Component Value   Culture No growth, less than 1 day P             10/02/2023 1713 10/04/2023 0740 Anaerobic Bacterial Culture Routine [[id number]]   Abscess from Other    Preliminary result Component Value   Culture Culture in progress P             10/02/2023 1713 10/03/2023 0729 Gram Stain [[id number]]   (Abnormal)   Abscess from Other    Final result Component Value   Gram Stain Result 4+ PMNs/low power field Abnormal    Gram Stain Result 2+ Squamous epithelial cells/low power field Abnormal    Gram Stain Result 3+ Gram positive cocci in pairs and chains Abnormal    Gram Stain Result 2+ Gram negative bacilli Abnormal           10/02/2023 1713 10/04/2023 0659 Abscess Aerobic Bacterial Culture Routine [[id number]]   (Abnormal)   Abscess from Other    Preliminary result Component Value   Culture 2+ Non lactose fermenting gram negative bacilli Abnormal  P    2+ Lactose fermenting gram negative bacilli Abnormal  P             10/02/2023 1329 10/04/2023 0614 Blood  Culture Peripheral Blood [[id number]]   Peripheral Blood    Preliminary result Component Value   Culture No growth after 1 day P             10/02/2023 1329 10/04/2023 0614 Blood Culture Peripheral Blood [[id number]]   Peripheral Blood    Preliminary result Component Value   Culture No growth after 1 day P            Recommendations/Interventions:  Patient lost IV access, provider asked if antibiotics could be switched to oral. Due to renal function, ciprofloxacin was changed to 500 mg q24h. Metronidazole was changed from IV BID to PO TID.      Mary Kate Bailey RPH  October 4, 2023

## 2023-10-04 NOTE — DISCHARGE INSTRUCTIONS
Hold lisinopril until seen by Dr Hutchinson   Restart Lasix tomorrow  Check labs this week  Follow up with Dr Hutchinson next week.   Follow up with general surgery

## 2023-10-05 ENCOUNTER — TELEPHONE (OUTPATIENT)
Dept: FAMILY MEDICINE | Facility: OTHER | Age: 78
End: 2023-10-05

## 2023-10-05 VITALS
TEMPERATURE: 97.2 F | DIASTOLIC BLOOD PRESSURE: 67 MMHG | OXYGEN SATURATION: 96 % | SYSTOLIC BLOOD PRESSURE: 116 MMHG | HEIGHT: 61 IN | RESPIRATION RATE: 18 BRPM | HEART RATE: 75 BPM | BODY MASS INDEX: 33.96 KG/M2 | WEIGHT: 179.9 LBS

## 2023-10-05 PROBLEM — G47.33 OBSTRUCTIVE SLEEP APNEA: Chronic | Status: ACTIVE | Noted: 2019-01-24

## 2023-10-05 PROBLEM — I70.0 ATHEROSCLEROSIS OF AORTA (H): Chronic | Status: ACTIVE | Noted: 2018-12-07

## 2023-10-05 PROBLEM — M17.11 OSTEOARTHRITIS OF RIGHT KNEE: Status: ACTIVE | Noted: 2022-01-27

## 2023-10-05 PROBLEM — N17.9 ACUTE NONTRAUMATIC KIDNEY INJURY (H): Status: ACTIVE | Noted: 2020-01-30

## 2023-10-05 PROBLEM — R07.81 PLEURITIC CHEST PAIN: Status: ACTIVE | Noted: 2020-08-20

## 2023-10-05 PROBLEM — M85.80 OSTEOPENIA: Status: ACTIVE | Noted: 2021-10-31

## 2023-10-05 PROBLEM — D64.9 ANEMIA: Status: ACTIVE | Noted: 2020-08-23

## 2023-10-05 PROBLEM — J11.1 INFLUENZA: Status: ACTIVE | Noted: 2020-01-30

## 2023-10-05 PROBLEM — Z86.19 HX OF SEPSIS: Status: ACTIVE | Noted: 2020-01-30

## 2023-10-05 PROBLEM — J18.9 RIGHT LOWER LOBE PNEUMONIA: Status: ACTIVE | Noted: 2020-08-20

## 2023-10-05 PROBLEM — N18.31 CHRONIC KIDNEY DISEASE, STAGE 3A (H): Chronic | Status: ACTIVE | Noted: 2019-08-30

## 2023-10-05 LAB
ANION GAP SERPL CALCULATED.3IONS-SCNC: 9 MMOL/L (ref 7–15)
ATRIAL RATE - MUSE: 69 BPM
BACTERIA UR CULT: NO GROWTH
BASO+EOS+MONOS # BLD AUTO: ABNORMAL 10*3/UL
BASO+EOS+MONOS NFR BLD AUTO: ABNORMAL %
BASOPHILS # BLD AUTO: 0.1 10E3/UL (ref 0–0.2)
BASOPHILS NFR BLD AUTO: 1 %
BUN SERPL-MCNC: 31.7 MG/DL (ref 8–23)
CALCIUM SERPL-MCNC: 8.4 MG/DL (ref 8.8–10.2)
CHLORIDE SERPL-SCNC: 108 MMOL/L (ref 98–107)
CREAT SERPL-MCNC: 1.4 MG/DL (ref 0.51–0.95)
DEPRECATED HCO3 PLAS-SCNC: 23 MMOL/L (ref 22–29)
DIASTOLIC BLOOD PRESSURE - MUSE: NORMAL MMHG
EGFRCR SERPLBLD CKD-EPI 2021: 39 ML/MIN/1.73M2
EOSINOPHIL # BLD AUTO: 0.3 10E3/UL (ref 0–0.7)
EOSINOPHIL NFR BLD AUTO: 3 %
ERYTHROCYTE [DISTWIDTH] IN BLOOD BY AUTOMATED COUNT: 13.1 % (ref 10–15)
GLUCOSE SERPL-MCNC: 96 MG/DL (ref 70–99)
HCT VFR BLD AUTO: 32.4 % (ref 35–47)
HGB BLD-MCNC: 10.2 G/DL (ref 11.7–15.7)
IMM GRANULOCYTES # BLD: 0.1 10E3/UL
IMM GRANULOCYTES NFR BLD: 1 %
INTERPRETATION ECG - MUSE: NORMAL
LYMPHOCYTES # BLD AUTO: 2.7 10E3/UL (ref 0.8–5.3)
LYMPHOCYTES NFR BLD AUTO: 23 %
MCH RBC QN AUTO: 31.9 PG (ref 26.5–33)
MCHC RBC AUTO-ENTMCNC: 31.5 G/DL (ref 31.5–36.5)
MCV RBC AUTO: 101 FL (ref 78–100)
MONOCYTES # BLD AUTO: 0.9 10E3/UL (ref 0–1.3)
MONOCYTES NFR BLD AUTO: 8 %
NEUTROPHILS # BLD AUTO: 7.6 10E3/UL (ref 1.6–8.3)
NEUTROPHILS NFR BLD AUTO: 64 %
NRBC # BLD AUTO: 0 10E3/UL
NRBC BLD AUTO-RTO: 0 /100
P AXIS - MUSE: 53 DEGREES
PLATELET # BLD AUTO: 310 10E3/UL (ref 150–450)
POTASSIUM SERPL-SCNC: 4 MMOL/L (ref 3.4–5.3)
PR INTERVAL - MUSE: 170 MS
QRS DURATION - MUSE: 106 MS
QT - MUSE: 418 MS
QTC - MUSE: 447 MS
R AXIS - MUSE: 46 DEGREES
RBC # BLD AUTO: 3.2 10E6/UL (ref 3.8–5.2)
SODIUM SERPL-SCNC: 140 MMOL/L (ref 135–145)
SYSTOLIC BLOOD PRESSURE - MUSE: NORMAL MMHG
T AXIS - MUSE: 39 DEGREES
VENTRICULAR RATE- MUSE: 69 BPM
WBC # BLD AUTO: 11.7 10E3/UL (ref 4–11)

## 2023-10-05 PROCEDURE — 999N000157 HC STATISTIC RCP TIME EA 10 MIN

## 2023-10-05 PROCEDURE — 36415 COLL VENOUS BLD VENIPUNCTURE: CPT | Performed by: HOSPITALIST

## 2023-10-05 PROCEDURE — 250N000013 HC RX MED GY IP 250 OP 250 PS 637: Performed by: HOSPITALIST

## 2023-10-05 PROCEDURE — 80048 BASIC METABOLIC PNL TOTAL CA: CPT | Performed by: HOSPITALIST

## 2023-10-05 PROCEDURE — 250N000011 HC RX IP 250 OP 636: Performed by: HOSPITALIST

## 2023-10-05 PROCEDURE — 85025 COMPLETE CBC W/AUTO DIFF WBC: CPT | Performed by: HOSPITALIST

## 2023-10-05 PROCEDURE — 250N000013 HC RX MED GY IP 250 OP 250 PS 637: Performed by: SURGERY

## 2023-10-05 RX ORDER — CIPROFLOXACIN 2 MG/ML
400 INJECTION, SOLUTION INTRAVENOUS EVERY 12 HOURS
Status: DISCONTINUED | OUTPATIENT
Start: 2023-10-05 | End: 2023-10-05 | Stop reason: HOSPADM

## 2023-10-05 RX ADMIN — CARVEDILOL 3.12 MG: 3.12 TABLET, FILM COATED ORAL at 08:24

## 2023-10-05 RX ADMIN — METRONIDAZOLE 500 MG: 500 INJECTION, SOLUTION INTRAVENOUS at 08:24

## 2023-10-05 RX ADMIN — FUROSEMIDE 20 MG: 20 TABLET ORAL at 08:24

## 2023-10-05 RX ADMIN — CIPROFLOXACIN 400 MG: 2 INJECTION, SOLUTION INTRAVENOUS at 04:13

## 2023-10-05 ASSESSMENT — ACTIVITIES OF DAILY LIVING (ADL)
ADLS_ACUITY_SCORE: 32

## 2023-10-05 NOTE — DISCHARGE SUMMARY
INPATIENT DISCHARGE SUMMARY  10/5/2023    Patient'S Name: Any Selby    Admitting Physician of Record: Stu Francisco MD    Discharging Physician: Stu Francisco MD    Date of admission: 10/2/2023     Date of discharge: 10/5/2023    Admitting diagnosis: Perianal abscess [K61.0]  Perirectal abscess [K61.1]    Discharge diagnosis: Same    Procedures: Procedure(s):  INCISION AND DRAINAGE, LEFT PERIRECTAL ABSCESS, COMPLEX    Consultants: Medicine    Hospital course: The patient was admitted to the hospital and taken to the operating room and underwent an Procedure(s):  INCISION AND DRAINAGE, LEFT PERIRECTAL ABSCESS, COMPLEX.  The patient tolerated the procedure(s) well and was transferred to the sidhu.  Her postoperative course has been completely unremarkable.  At the time of discharge she is eating a Regular diet, is having good pain control on oral medications, and is passing gas and is having bowel movements.  The patient will be discharged home in good condition.    Discharge instructions include:    Patient will be discharged to Home   Diet:   Active Diet and Nourishment Order   Procedures    Regular Diet Adult    Diet      Activity : no lifting restrictions   Follow-up:     The patient will follow up with her primary care provider in 1 weeks.      The patient will follow up with me in 1 weeks.   Medications include:    All prior medications

## 2023-10-05 NOTE — PLAN OF CARE
Patient discharged at  13:30 PM via wheel chair accompanied by daughter and staff. Prescriptions sent to patients preferred pharmacy. All belongings sent with patient.     Discharge instructions reviewed with patient. Listed belongings gathered and returned to patient. Clothing, cane, shoes, phone    Patient discharged to home.     Surgical Patient   Surgical Procedures during stay: I&D  Did patient receive discharge instruction on wound care and recognition of infection symptoms? Yes    MISC  Follow up appointment made:  Yes by daughter  Home medications returned to patient: N/A  Patient reports pain was well managed at discharge:  N/A

## 2023-10-05 NOTE — PHARMACY-MEDICATION REGIMEN REVIEW
Pharmacy Antimicrobial Stewardship Assessment     Current Antimicrobial Therapy:  Anti-infectives (From now, onward)      Start     Dose/Rate Route Frequency Ordered Stop    10/05/23 0430  ciprofloxacin (CIPRO) infusion 400 mg         400 mg  over 1 Hours Intravenous EVERY 24 HOURS 10/04/23 1710      10/04/23 2100  metroNIDAZOLE (FLAGYL) infusion 500 mg        Note to Pharmacy: Metronidazole IV may be dosed more frequently than Q12h for bacteremia, CNS infection and Clostridium difficile.    500 mg  over 60 Minutes Intravenous EVERY 12 HOURS 10/04/23 1712              Indication: Abscess    Days of Therapy: 4     Pertinent Labs:  Recent Labs   Lab Test 10/05/23  0512 10/04/23  0518 10/03/23  0507   WBC 11.7* 18.3* 17.1*     Recent Labs   Lab Test 10/04/23  0518 10/02/23  1325 10/02/23  1248   LACT  --  1.0  --    PCAL 0.15*  --  0.31*        Temperature:  Temp (24hrs), Av  F (36.7  C), Min:97.5  F (36.4  C), Max:98.6  F (37  C)    Culture Results:   7-Day Micro Results    Collected Updated Procedure Result Status    10/03/2023 1111 10/04/2023 0727 Urine Culture [[id number]]   Urine, Clean Catch    Preliminary result Component Value   Culture No growth, less than 1 day P             10/02/2023 1713 10/04/2023 0740 Anaerobic Bacterial Culture Routine [[id number]]   Abscess from Other    Preliminary result Component Value   Culture Culture in progress P             10/02/2023 1713 10/03/2023 0729 Gram Stain [[id number]]   (Abnormal)   Abscess from Other    Final result Component Value   Gram Stain Result 4+ PMNs/low power field Abnormal    Gram Stain Result 2+ Squamous epithelial cells/low power field Abnormal    Gram Stain Result 3+ Gram positive cocci in pairs and chains Abnormal    Gram Stain Result 2+ Gram negative bacilli Abnormal           10/02/2023 1713 10/05/2023 0631 Abscess Aerobic Bacterial Culture Routine [[id number]]    (Abnormal)   Abscess from Other    Preliminary result Component Value    Culture 2+ Escherichia coli Abnormal  P    2+ Escherichia coli Abnormal  P    Strain 2       Susceptibility     Escherichia coli (1) Escherichia coli (2)     JOSE (Preliminary) JOSE (Preliminary)     Amoxicillin/Clavulanate 8 Susceptible * <=2 Susceptible *     Ampicillin >=32 Resistant <=2 Susceptible     Ampicillin/ Sulbactam 8 Susceptible <=2 Susceptible     Cefazolin <=4 Intermediate * <=4 Intermediate *     Cefepime <=1 Susceptible <=1 Susceptible     Ceftazidime <=1 Susceptible <=1 Susceptible     Ceftriaxone <=1 Susceptible <=1 Susceptible     Ciprofloxacin <=0.25 Susceptible <=0.25 Susceptible     Ertapenem <=0.5 Susceptible <=0.5 Susceptible     Extended Spectrum Beta-Lactamase Negative ESBL Negative * Negative ESBL Negative *     Gentamicin <=1 Susceptible <=1 Susceptible     Imipenem <=0.25 Susceptible <=0.25 Susceptible     Levofloxacin <=0.12 Susceptible <=0.12 Susceptible     Nitrofurantoin <=16 Susceptible * <=16 Susceptible *     Piperacillin/Tazobactam <=4 Susceptible <=4 Susceptible     Tobramycin <=1 Susceptible <=1 Susceptible     Trimethoprim/Sulfamethoxazole <=1/19 Susceptible <=1/19 Susceptible                * Suppressed Antibiotic            10/02/2023 1329 10/04/2023 0614 Blood Culture Peripheral Blood [[id number]]   Peripheral Blood    Preliminary result Component Value   Culture No growth after 1 day P             10/02/2023 1329 10/04/2023 0614 Blood Culture Peripheral Blood [[id number]]   Peripheral Blood    Preliminary result Component Value   Culture No growth after 1 day P            Recommendations/Interventions:  Renal function has been hovering right above and below 30 mL/min. Will adjust ciprofloxacin to 400 mg q12h. Will continue to closely monitor renal function and adjust dose when necessary.       Mary Kate Bailey, GARCIA  October 5, 2023

## 2023-10-05 NOTE — CARE PLAN
"Most recent vitals: /52 (BP Location: Left arm, Patient Position: Right side, Cuff Size: Adult Regular)   Pulse 68   Temp 97.5  F (36.4  C) (Tympanic)   Resp 18   Ht 1.549 m (5' 1\")   Wt 81.6 kg (179 lb 14.3 oz)   SpO2 95%   BMI 33.99 kg/m      IVF:  SL R. Wrist  ABX:  IV Flagyl & Cipro    Nutrition: Regular diet  Ambulation: SBA w/ walker (patient refuses gait belt)    Comments:    A/O, confused @ times, VSS, afebrile, abdominal pain 2-5/10 (reports abd. Pain/cramping, increased Loose BM's), 95% on RA, Lungs clear & equal.  Perirectal incision has no drainage, open to air, cleaned with yanna-bottle independently per patient.      Face to face report given with opportunity to observe patient.    Report given to Dolores RUSSELL RN & JAMES Luna RN   10/5/2023  7:00 AM      "

## 2023-10-05 NOTE — PLAN OF CARE
"Reason for hospital stay:  Perirectal Abscess  Living situation PTA: Home with daughter  Most recent vitals: /70 (BP Location: Right arm, Patient Position: Sitting, Cuff Size: Adult Regular)   Pulse 63   Temp 97.8  F (36.6  C) (Tympanic)   Resp 18   Ht 1.549 m (5' 1\")   Wt 81.6 kg (179 lb 14.3 oz)   SpO2 97%   BMI 33.99 kg/m      Pain Management:  Denied any pain this shift. Patient used cold therapy.   LOC:  A&O x4 with intermittent confusion  Cardiac:  Apical pulse regular  Respiratory:  Lung sounds clear but diminished and equal bilaterally. Maintaining sats on room air.  GI:  Had 1 BM this shift  :  Voiding spontaneously without difficulty  Skin Issues:  Surgical incision to perirectal area - Open to air and cleansed with yanna bottle after voiding.     IVF:  SL  ABX:  IV Flagyl and Cipro    Nutrition: Regular diet  Activity: Ambulates to bathroom with stand by assist and a walker. - Refuses gait belt  Safety:  Alarms active and audible, call light within reach, calls appropriately, makes needs known.     Face to face report given with opportunity to observe patient.    Report given to JAMES Marcum RN   10/4/2023  11:06 PM    " Chief Complaint   Patient presents with     Pelvic Pain     Patinent is here for pelvic pain.      Luis Daniel Jacobson LPN 11:07 AM January 6, 2017

## 2023-10-05 NOTE — TELEPHONE ENCOUNTER
1:14 PM    Reason for Call: OVERBOOK    Patient is needing hospital follow up / [facility] / dis 10-5-23 / abscess   Hospital called for appt    The patient is requesting an appointment for with in 7 days of discharge with Dr. Hutchinson.    Was an appointment offered for this call? No  If yes : Appointment type              Date    Preferred method for responding to this message: Telephone Call  What is your phone number ? [phone]     If we cannot reach you directly, may we leave a detailed response at the number you provided? No    Can this message wait until your PCP/provider returns, if unavailable today? Abiola Turner

## 2023-10-06 ENCOUNTER — PATIENT OUTREACH (OUTPATIENT)
Dept: CARE COORDINATION | Facility: OTHER | Age: 78
End: 2023-10-06

## 2023-10-06 NOTE — Clinical Note
TCM completed. Offered to schedule a follow up appointment with you for next week. Daughter declined and stated she would wait for you to text her and let her know. No further outreach by this RN CC.

## 2023-10-06 NOTE — PROGRESS NOTES
"Clinic Care Coordination Contact  [facility]: Post-Discharge Note  SITUATION                                                      Admission:    Admission Date: 10/02/23   Reason for Admission: Perianal abscess (K61.0)  Perirectal abscess (K61.1)  Discharge:   Discharge Date: 10/05/23  Discharge Diagnosis: Perianal abscess (K61.0)  Perirectal abscess (K61.1)    BACKGROUND                                                      Per hospital discharge summary and inpatient provider notes:  underwent an Procedure(s):  INCISION AND DRAINAGE, LEFT PERIRECTAL ABSCESS, COMPLEX.  The patient tolerated the procedure(s) well and was transferred to the sidhu.  Her postoperative course has been completely unremarkable.  At the time of discharge she is eating a Regular diet, is having good pain control on oral medications, and is passing gas and is having bowel movements.  The patient will be discharged home in good condition.     Discharge instructions include:               Patient will be discharged to Home              Diet:       Active Diet and Nourishment Order   Procedures    Regular Diet Adult    Diet                  Activity : no lifting restrictions              Follow-up:                           The patient will follow up with her primary care provider in 1 weeks.                          The patient will follow up with me in 1 weeks.              Medications include:                          All prior medications                              ASSESSMENT           Discharge Assessment  How are you doing now that you are home?: Spoke with daughter Amanda. Amanda states patient \"just got discharged at 3 pm yesterday\", so was unable to answer how she is doing since being discharged. States patient is currently sleeping. 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 Daughter stated \"No, Nikita will text me on Monday and tell me when to come in.\" No further outreach will be made by this RN CC. Program closed.  How are your symptoms? (Red Flag symptoms escalate to triage hotline per guidelines): Unchanged  Do you feel your condition is stable enough to be safe at home until your provider visit?: Yes  Does the patient have their discharge instructions? : Yes  Does the patient have questions regarding their discharge instructions? : No  Were you started on any new medications or were there changes to any of your previous medications? : Yes  Does the patient have all of their medications?: Yes  Do you have questions regarding any of your medications? : No  Do you have all of your needed medical supplies or equipment (DME)?  (i.e. oxygen tank, CPAP, cane, etc.): Yes  Discharge follow-up appointment scheduled within 14 calendar days? : No  Is patient agreeable to assistance with scheduling? : No              PLAN                                                      Outpatient Plan:  Spoke with daughter Amanda. Amanda states patient \"just got discharged at 3 pm yesterday\", so was unable to answer how she is doing since being discharged. States patient is currently sleeping. 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 Daughter stated \"No, Nikita will text me on Monday and tell me when to come in.\" No further outreach will be made by this RN CC. Program closed.     Future Appointments   Date Time Provider Department Center   10/11/2023 11:00 AM Claudia Hernandez NP [facility]   10/11/2023  4:00 PM [facility]   3/26/2024  2:15 PM Nikita Hutchinson MD [facility]         For any urgent concerns, please contact our 24 hour nurse triage line: [phone] " ([phone])         Janet Umana RN

## 2023-10-06 NOTE — TELEPHONE ENCOUNTER
Called and spoke with patient. Patient is scheduled for a hospital follow up on 10/13/2023 at 145.

## 2023-10-07 LAB
ATRIAL RATE - MUSE: 72 BPM
BACTERIA ABSC ANAEROBE+AEROBE CULT: ABNORMAL
BACTERIA BLD CULT: NO GROWTH
BACTERIA BLD CULT: NO GROWTH
BETA LACTAMASE TEST: NEGATIVE
DIASTOLIC BLOOD PRESSURE - MUSE: NORMAL MMHG
INTERPRETATION ECG - MUSE: NORMAL
P AXIS - MUSE: 49 DEGREES
PR INTERVAL - MUSE: 172 MS
QRS DURATION - MUSE: 106 MS
QT - MUSE: 396 MS
QTC - MUSE: 433 MS
R AXIS - MUSE: 42 DEGREES
SYSTOLIC BLOOD PRESSURE - MUSE: NORMAL MMHG
T AXIS - MUSE: 112 DEGREES
VENTRICULAR RATE- MUSE: 72 BPM

## 2023-10-08 LAB
BACTERIA ABSC ANAEROBE+AEROBE CULT: ABNORMAL

## 2023-10-09 ENCOUNTER — TELEPHONE (OUTPATIENT)
Dept: FAMILY MEDICINE | Facility: OTHER | Age: 78
End: 2023-10-09

## 2023-10-09 NOTE — TELEPHONE ENCOUNTER
EDINM for pt to call me back directly to schedule with Dr. Hutchinson 10/10 at 11am with labs first.

## 2023-10-10 ENCOUNTER — OFFICE VISIT (OUTPATIENT)
Dept: FAMILY MEDICINE | Facility: OTHER | Age: 78
End: 2023-10-10
Attending: FAMILY MEDICINE
Payer: COMMERCIAL

## 2023-10-10 ENCOUNTER — APPOINTMENT (OUTPATIENT)
Dept: LAB | Facility: OTHER | Age: 78
End: 2023-10-10
Payer: MEDICARE

## 2023-10-10 VITALS
SYSTOLIC BLOOD PRESSURE: 104 MMHG | WEIGHT: 179.88 LBS | DIASTOLIC BLOOD PRESSURE: 66 MMHG | TEMPERATURE: 98.5 F | HEART RATE: 88 BPM | OXYGEN SATURATION: 96 % | BODY MASS INDEX: 33.99 KG/M2

## 2023-10-10 DIAGNOSIS — N18.31 STAGE 3A CHRONIC KIDNEY DISEASE (H): ICD-10-CM

## 2023-10-10 DIAGNOSIS — R41.89 COGNITIVE AND BEHAVIORAL CHANGES: ICD-10-CM

## 2023-10-10 DIAGNOSIS — K61.1 PERIRECTAL ABSCESS: ICD-10-CM

## 2023-10-10 DIAGNOSIS — I50.22 CHRONIC SYSTOLIC CONGESTIVE HEART FAILURE (H): ICD-10-CM

## 2023-10-10 DIAGNOSIS — D72.829 LEUKOCYTOSIS, UNSPECIFIED TYPE: ICD-10-CM

## 2023-10-10 DIAGNOSIS — N18.31 CHRONIC KIDNEY DISEASE, STAGE 3A (H): ICD-10-CM

## 2023-10-10 DIAGNOSIS — Z09 HOSPITAL DISCHARGE FOLLOW-UP: Primary | ICD-10-CM

## 2023-10-10 DIAGNOSIS — R46.89 COGNITIVE AND BEHAVIORAL CHANGES: ICD-10-CM

## 2023-10-10 LAB
ANION GAP SERPL CALCULATED.3IONS-SCNC: 12 MMOL/L (ref 7–15)
BASO+EOS+MONOS # BLD AUTO: ABNORMAL 10*3/UL
BASO+EOS+MONOS NFR BLD AUTO: ABNORMAL %
BASOPHILS # BLD AUTO: 0.1 10E3/UL (ref 0–0.2)
BASOPHILS NFR BLD AUTO: 1 %
BUN SERPL-MCNC: 24.4 MG/DL (ref 8–23)
CALCIUM SERPL-MCNC: 9.1 MG/DL (ref 8.8–10.2)
CHLORIDE SERPL-SCNC: 104 MMOL/L (ref 98–107)
CREAT SERPL-MCNC: 1.37 MG/DL (ref 0.51–0.95)
DEPRECATED HCO3 PLAS-SCNC: 25 MMOL/L (ref 22–29)
EGFRCR SERPLBLD CKD-EPI 2021: 40 ML/MIN/1.73M2
EOSINOPHIL # BLD AUTO: 0.4 10E3/UL (ref 0–0.7)
EOSINOPHIL NFR BLD AUTO: 4 %
ERYTHROCYTE [DISTWIDTH] IN BLOOD BY AUTOMATED COUNT: 13.6 % (ref 10–15)
GLUCOSE SERPL-MCNC: 119 MG/DL (ref 70–99)
HCT VFR BLD AUTO: 40.2 % (ref 35–47)
HGB BLD-MCNC: 12.4 G/DL (ref 11.7–15.7)
IMM GRANULOCYTES # BLD: 0.1 10E3/UL
IMM GRANULOCYTES NFR BLD: 1 %
LYMPHOCYTES # BLD AUTO: 2.2 10E3/UL (ref 0.8–5.3)
LYMPHOCYTES NFR BLD AUTO: 24 %
MCH RBC QN AUTO: 32.3 PG (ref 26.5–33)
MCHC RBC AUTO-ENTMCNC: 30.8 G/DL (ref 31.5–36.5)
MCV RBC AUTO: 105 FL (ref 78–100)
MONOCYTES # BLD AUTO: 0.7 10E3/UL (ref 0–1.3)
MONOCYTES NFR BLD AUTO: 8 %
NEUTROPHILS # BLD AUTO: 5.8 10E3/UL (ref 1.6–8.3)
NEUTROPHILS NFR BLD AUTO: 62 %
NRBC # BLD AUTO: 0 10E3/UL
NRBC BLD AUTO-RTO: 0 /100
PLATELET # BLD AUTO: 392 10E3/UL (ref 150–450)
POTASSIUM SERPL-SCNC: 4.1 MMOL/L (ref 3.4–5.3)
RBC # BLD AUTO: 3.84 10E6/UL (ref 3.8–5.2)
SODIUM SERPL-SCNC: 141 MMOL/L (ref 135–145)
WBC # BLD AUTO: 9.2 10E3/UL (ref 4–11)

## 2023-10-10 PROCEDURE — 36415 COLL VENOUS BLD VENIPUNCTURE: CPT | Mod: ZL | Performed by: FAMILY MEDICINE

## 2023-10-10 PROCEDURE — 99496 TRANSJ CARE MGMT HIGH F2F 7D: CPT | Performed by: FAMILY MEDICINE

## 2023-10-10 PROCEDURE — 80048 BASIC METABOLIC PNL TOTAL CA: CPT | Mod: ZL | Performed by: FAMILY MEDICINE

## 2023-10-10 PROCEDURE — G0463 HOSPITAL OUTPT CLINIC VISIT: HCPCS

## 2023-10-10 PROCEDURE — 85014 HEMATOCRIT: CPT | Mod: ZL | Performed by: FAMILY MEDICINE

## 2023-10-10 RX ORDER — LISINOPRIL 10 MG/1
5 TABLET ORAL DAILY
Qty: 90 TABLET | Refills: 3 | COMMUNITY
Start: 2023-10-11 | End: 2023-10-12

## 2023-10-10 ASSESSMENT — PAIN SCALES - GENERAL: PAINLEVEL: NO PAIN (0)

## 2023-10-10 NOTE — PROGRESS NOTES
"  Assessment & Plan     Hospital discharge follow-up  Doing real well.   - CBC with Platelets & Differential; Future  - Basic metabolic panel; Future  - CBC with Platelets & Differential  - Basic metabolic panel    Perirectal abscess  Resolving. Seeing Surgery dept tomorrow   - CBC with Platelets & Differential; Future  - Basic metabolic panel; Future  - CBC with Platelets & Differential  - Basic metabolic panel    Leukocytosis, unspecified type  Resolved   - CBC with Platelets & Differential; Future  - Basic metabolic panel; Future  - CBC with Platelets & Differential  - Basic metabolic panel    Chronic systolic congestive heart failure (H)  Well compensated. Restart Zestril at half dose of 5mg   - CBC with Platelets & Differential; Future  - Basic metabolic panel; Future  - CBC with Platelets & Differential  - Basic metabolic panel    Stage 3a chronic kidney disease (H)  Stable   - CBC with Platelets & Differential; Future  - Basic metabolic panel; Future  - CBC with Platelets & Differential  - Basic metabolic panel    Chronic kidney disease, stage 3a (H)  Stable   - CBC with Platelets & Differential; Future  - Basic metabolic panel; Future  - CBC with Platelets & Differential  - Basic metabolic panel    Cognitive and behavioral changes  Stable   - lisinopril (ZESTRIL) 10 MG tablet; Take 0.5 tablets (5 mg) by mouth daily    Follow-up as scheduled in March  Symptomatic treatment was discussed along when patient should call and/or come back into the clinic or go to ER/Urgent care. All questions answered.        MED REC REQUIRED  Post Medication Reconciliation Status: discharge medications reconciled, continue medications without change  BMI:   Estimated body mass index is 33.99 kg/m  as calculated from the following:    Height as of 10/2/23: 1.549 m (5' 1\").    Weight as of this encounter: 81.6 kg (179 lb 14 oz).           No follow-ups on file.    Nikita Hutchinson MD  [facility] - " "DEVIN Agarwal is a 77 year old, presenting for the following health issues:  Hospital F/U        10/10/2023    10:51 AM   Additional Questions   Roomed by Aleah Harry   Accompanied by None         10/10/2023    10:51 AM   Patient Reported Additional Medications   Patient reports taking the following new medications None       [facility]         10/6/2023     9:05 AM   Post Discharge Outreach   Admission Date 10/2/2023   Reason for Admission Perianal abscess (K61.0)  Perirectal abscess (K61.1)   Discharge Date 10/5/2023   Discharge Diagnosis Perianal abscess (K61.0)  Perirectal abscess (K61.1)   How are you doing now that you are home? Spoke with daughter Amanda. Amanda states patient \"just got discharged at 3 pm yesterday\", so was unable to answer how she is doing since being discharged. States patient is currently sleeping. 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 Daughter stated \"No, Nikita will text me on Monday and tell me when to come in.\" No further outreach will be made by this RN CC. Program closed.   How are your symptoms? (Red Flag symptoms escalate to triage hotline per guidelines) Unchanged   Do you feel your condition is stable enough to be safe at home until your provider visit? Yes   Does the patient have their discharge instructions?  Yes   Does the patient have questions regarding their discharge instructions?  No   Were you started on any new medications or were there changes to any of your previous medications?  Yes   Does the patient have all of their medications? Yes   Do you have questions regarding any of your medications?  No   Do you have all of your needed medical supplies or equipment (DME)?  (i.e. oxygen tank, CPAP, cane, etc.) Yes   Discharge follow-up appointment scheduled within 14 calendar days? "  No     Hospital Follow-up Visit:    Hospital/Nursing Home/IP Rehab Facility: [facility]  Date of Admission: 10/2/2023  Date of Discharge: 10/5/2023  Reason(s) for Admission: Perirectal abscess, Perianal abscess    Was your hospitalization related to COVID-19? No   Problems taking medications regularly:  None  Medication changes since discharge: Patient is unsure, daughter I supposed to be coming to her appointment and she said that she would know  Problems adhering to non-medication therapy:  None    Summary of hospitalization:  [facility] discharge summary reviewed  Diagnostic Tests/Treatments reviewed.  Follow up needed: none  Other Healthcare Providers Involved in Patient s Care:         None  Update since discharge: improved.         Plan of care communicated with patient and dtr             Doing well  No CHF sx\  BP ok  No LH or dizzy  Stools little hard - using senna  I and D site - no issues no pain       Review of Systems   Constitutional, HEENT, cardiovascular, pulmonary, gi and gu systems are negative, except as otherwise noted.      Objective    /66 (BP Location: Right arm, Patient Position: Sitting, Cuff Size: Adult Regular)   Pulse 88   Temp 98.5  F (36.9  C) (Tympanic)   Wt 81.6 kg (179 lb 14 oz)   SpO2 96%   BMI 33.99 kg/m    Body mass index is 33.99 kg/m .  Physical Exam   GENERAL: healthy, alert and no distress  NECK: no adenopathy, no asymmetry, masses, or scars and thyroid normal to palpation  RESP: lungs clear to auscultation - no rales, rhonchi or wheezes  CV: regular rate and rhythm, normal S1 S2, no S3 or S4, no murmur, click or rub, no peripheral edema and peripheral pulses strong  ABDOMEN: soft, nontender, no hepatosplenomegaly, no masses and bowel sounds normal  MS: no gross musculoskeletal defects noted, no edema        Results for orders placed or performed in visit on 10/10/23   Basic metabolic panel     Status: Abnormal   Result Value Ref [facility]     Sodium 141 135 - 145 mmol/L    Potassium 4.1 3.4 - 5.3 mmol/L    Chloride 104 98 - 107 mmol/L    Carbon Dioxide (CO2) 25 22 - 29 mmol/L    Anion Gap 12 7 - 15 mmol/L    Urea Nitrogen 24.4 (H) 8.0 - 23.0 mg/dL    Creatinine 1.37 (H) 0.51 - 0.95 mg/dL    GFR Estimate 40 (L) >60 mL/min/1.73m2    Calcium 9.1 8.8 - 10.2 mg/dL    Glucose 119 (H) 70 - 99 mg/dL   CBC with platelets and differential     Status: Abnormal   Result Value Ref Range    WBC Count 9.2 4.0 - 11.0 10e3/uL    RBC Count 3.84 3.80 - 5.20 10e6/uL    Hemoglobin 12.4 11.7 - 15.7 g/dL    Hematocrit 40.2 35.0 - 47.0 %     (H) 78 - 100 fL    MCH 32.3 26.5 - 33.0 pg    MCHC 30.8 (L) 31.5 - 36.5 g/dL    RDW 13.6 10.0 - 15.0 %    Platelet Count 392 150 - 450 10e3/uL    % Neutrophils 62 %    % Lymphocytes 24 %    % Monocytes 8 %    Mids % (Monos, Eos, Basos)      % Eosinophils 4 %    % Basophils 1 %    % Immature Granulocytes 1 %    NRBCs per 100 WBC 0 <1 /100    Absolute Neutrophils 5.8 1.6 - 8.3 10e3/uL    Absolute Lymphocytes 2.2 0.8 - 5.3 10e3/uL    Absolute Monocytes 0.7 0.0 - 1.3 10e3/uL    Mids Abs (Monos, Eos, Basos)      Absolute Eosinophils 0.4 0.0 - 0.7 10e3/uL    Absolute Basophils 0.1 0.0 - 0.2 10e3/uL    Absolute Immature Granulocytes 0.1 <=0.4 10e3/uL    Absolute NRBCs 0.0 10e3/uL   CBC with Platelets & Differential     Status: Abnormal    Narrative    The following orders were created for panel order CBC with Platelets & Differential.  Procedure                               Abnormality         Status                     ---------                               -----------         ------                     CBC with platelets and d...[[id number]]  Abnormal            Final result                 Please view results for these tests on the individual orders.

## 2023-10-11 ENCOUNTER — ANCILLARY PROCEDURE (OUTPATIENT)
Dept: MAMMOGRAPHY | Facility: OTHER | Age: 78
End: 2023-10-11
Attending: FAMILY MEDICINE
Payer: MEDICARE

## 2023-10-11 ENCOUNTER — OFFICE VISIT (OUTPATIENT)
Dept: SURGERY | Facility: OTHER | Age: 78
End: 2023-10-11
Attending: NURSE PRACTITIONER
Payer: MEDICARE

## 2023-10-11 VITALS
OXYGEN SATURATION: 97 % | DIASTOLIC BLOOD PRESSURE: 62 MMHG | WEIGHT: 173.8 LBS | TEMPERATURE: 98.1 F | HEART RATE: 79 BPM | HEIGHT: 61 IN | BODY MASS INDEX: 32.81 KG/M2 | SYSTOLIC BLOOD PRESSURE: 100 MMHG

## 2023-10-11 DIAGNOSIS — Z12.31 ENCOUNTER FOR SCREENING MAMMOGRAM FOR BREAST CANCER: ICD-10-CM

## 2023-10-11 DIAGNOSIS — Z98.890 STATUS POST INCISION AND DRAINAGE: Primary | ICD-10-CM

## 2023-10-11 PROCEDURE — 77067 SCR MAMMO BI INCL CAD: CPT | Mod: TC

## 2023-10-11 PROCEDURE — G0463 HOSPITAL OUTPT CLINIC VISIT: HCPCS

## 2023-10-11 PROCEDURE — 99024 POSTOP FOLLOW-UP VISIT: CPT | Performed by: NURSE PRACTITIONER

## 2023-10-11 ASSESSMENT — PAIN SCALES - GENERAL: PAINLEVEL: NO PAIN (0)

## 2023-10-11 NOTE — PROGRESS NOTES
"CLINIC NOTE - POST-OP SURGERY  10/11/2023    Patient:Any Selby    Procedure:  incision and drainage of left perianal abscess    This is a 77 year old female who is 1 weeks s/p incision and drainage of left perianal abscess.  The patient has no complaints today.     Current Medications:  Current Outpatient Medications   Medication Sig Dispense Refill    acetaminophen (TYLENOL) 500 MG tablet Take 500 mg by mouth every 8 hours as needed for pain . Do not exceed 4000mg a day of acetaminophen from any source.      aspirin 81 MG EC tablet Take 81 mg by mouth daily      bisacodyl (DULCOLAX) 5 MG EC tablet Take 2 tablets by mouth daily as needed for constipation      carvedilol (COREG) 6.25 MG tablet Take 0.5 tablets (3.125 mg) by mouth 2 times daily (with meals) 90 tablet 3    colchicine (COLCRYS) 0.6 MG tablet 2 tablets at onset of gout and may repeat in 1 hour to improve gout at onset and may take 1 tablet daily for 5 more days if gout persists 15 tablet 1    furosemide (LASIX) 20 MG tablet Take 1 tablet (20 mg) by mouth daily 90 tablet 3    lidocaine (XYLOCAINE) 5 % external ointment Apply topically as needed (hemorrhoids)      lisinopril (ZESTRIL) 10 MG tablet Take 0.5 tablets (5 mg) by mouth daily 90 tablet 3    nifedipine 0.2% in white petrolatum 0.2 % OINT ointment Place rectally as needed (hemorrhoids)      predniSONE (DELTASONE) 10 MG tablet 4 tabs orally for 4 days then 2 tabs orally for 4 days then 1 tablet orally for 4 days 28 tablet 1       Allergies:  No Known Allergies    PHYSICAL EXAM:   Vital signs: /62   Pulse 79   Temp 98.1  F (36.7  C) (Tympanic)   Ht 1.549 m (5' 1\")   Wt 78.8 kg (173 lb 12.8 oz)   SpO2 97%   BMI 32.84 kg/m     BMI: Body mass index is 32.84 kg/m .   General: Normal, healthy, cooperative, in no acute distress, alert   Lungs: respirations are non-labored   Abdominal: non-distended   Wounds:  is healing and is granular      ASSESSMENT:    77 year old female who is 2 " weeks s/p incision and drainage of left perianal abscess.  Doing well.     PLAN:   Keep incisional site clean and dry    Follow-up 1-2 weeks for a recheck

## 2023-10-12 RX ORDER — LISINOPRIL 10 MG/1
5 TABLET ORAL DAILY
Qty: 90 TABLET | Refills: 3 | Status: SHIPPED | OUTPATIENT
Start: 2023-10-12

## 2023-10-13 DIAGNOSIS — K59.00 CONSTIPATION, UNSPECIFIED CONSTIPATION TYPE: ICD-10-CM

## 2023-10-13 DIAGNOSIS — I70.0 ATHEROSCLEROSIS OF AORTA (H): Primary | Chronic | ICD-10-CM

## 2023-10-13 RX ORDER — SENNOSIDES A AND B 8.6 MG/1
2 TABLET, FILM COATED ORAL EVERY MORNING
Qty: 180 TABLET | Refills: 3 | Status: SHIPPED | OUTPATIENT
Start: 2023-10-13

## 2023-10-13 RX ORDER — ASPIRIN 81 MG/1
81 TABLET ORAL DAILY
Qty: 90 TABLET | Refills: 3 | Status: SHIPPED | OUTPATIENT
Start: 2023-10-13

## 2023-10-13 NOTE — TELEPHONE ENCOUNTER
Pharmacy requesting ASA and Senna - states pt was discharged from hospital with these orders.  Pharmacy needs scripts.

## 2023-10-14 ENCOUNTER — ANESTHESIA EVENT (OUTPATIENT)
Dept: SURGERY | Facility: HOSPITAL | Age: 78
End: 2023-10-14
Payer: MEDICARE

## 2023-10-14 ENCOUNTER — HOSPITAL ENCOUNTER (OUTPATIENT)
Facility: HOSPITAL | Age: 78
Discharge: HOME | End: 2023-10-15
Attending: NURSE PRACTITIONER | Admitting: LEGAL MEDICINE
Payer: MEDICARE

## 2023-10-14 ENCOUNTER — APPOINTMENT (OUTPATIENT)
Dept: CT IMAGING | Facility: HOSPITAL | Age: 78
End: 2023-10-14
Attending: NURSE PRACTITIONER
Payer: MEDICARE

## 2023-10-14 ENCOUNTER — ANESTHESIA (OUTPATIENT)
Dept: SURGERY | Facility: HOSPITAL | Age: 78
End: 2023-10-14
Payer: MEDICARE

## 2023-10-14 ENCOUNTER — APPOINTMENT (OUTPATIENT)
Dept: ULTRASOUND IMAGING | Facility: HOSPITAL | Age: 78
End: 2023-10-14
Attending: NURSE PRACTITIONER
Payer: MEDICARE

## 2023-10-14 DIAGNOSIS — A41.9 SEPSIS (H): ICD-10-CM

## 2023-10-14 DIAGNOSIS — A41.9 SEPSIS, DUE TO UNSPECIFIED ORGANISM, UNSPECIFIED WHETHER ACUTE ORGAN DYSFUNCTION PRESENT (H): ICD-10-CM

## 2023-10-14 DIAGNOSIS — K61.1 PERIRECTAL ABSCESS: ICD-10-CM

## 2023-10-14 DIAGNOSIS — K61.2 ABSCESS OF ANAL AND RECTAL REGIONS: Primary | ICD-10-CM

## 2023-10-14 LAB
ANION GAP SERPL CALCULATED.3IONS-SCNC: 13 MMOL/L (ref 7–15)
BASO+EOS+MONOS # BLD AUTO: ABNORMAL 10*3/UL
BASO+EOS+MONOS NFR BLD AUTO: ABNORMAL %
BASOPHILS # BLD AUTO: 0.1 10E3/UL (ref 0–0.2)
BASOPHILS NFR BLD AUTO: 0 %
BUN SERPL-MCNC: 19.7 MG/DL (ref 8–23)
CALCIUM SERPL-MCNC: 8.7 MG/DL (ref 8.8–10.2)
CHLORIDE SERPL-SCNC: 102 MMOL/L (ref 98–107)
CREAT SERPL-MCNC: 1.26 MG/DL (ref 0.51–0.95)
DEPRECATED HCO3 PLAS-SCNC: 22 MMOL/L (ref 22–29)
EGFRCR SERPLBLD CKD-EPI 2021: 44 ML/MIN/1.73M2
EOSINOPHIL # BLD AUTO: 0.1 10E3/UL (ref 0–0.7)
EOSINOPHIL NFR BLD AUTO: 0 %
ERYTHROCYTE [DISTWIDTH] IN BLOOD BY AUTOMATED COUNT: 13.8 % (ref 10–15)
GLUCOSE SERPL-MCNC: 97 MG/DL (ref 70–99)
HCT VFR BLD AUTO: 33.3 % (ref 35–47)
HGB BLD-MCNC: 10.8 G/DL (ref 11.7–15.7)
HOLD SPECIMEN: NORMAL
HOLD SPECIMEN: NORMAL
IMM GRANULOCYTES # BLD: 0.2 10E3/UL
IMM GRANULOCYTES NFR BLD: 1 %
LACTATE SERPL-SCNC: 1.5 MMOL/L (ref 0.7–2)
LYMPHOCYTES # BLD AUTO: 2 10E3/UL (ref 0.8–5.3)
LYMPHOCYTES NFR BLD AUTO: 8 %
MCH RBC QN AUTO: 32.4 PG (ref 26.5–33)
MCHC RBC AUTO-ENTMCNC: 32.4 G/DL (ref 31.5–36.5)
MCV RBC AUTO: 100 FL (ref 78–100)
MONOCYTES # BLD AUTO: 1.6 10E3/UL (ref 0–1.3)
MONOCYTES NFR BLD AUTO: 6 %
NEUTROPHILS # BLD AUTO: 22.3 10E3/UL (ref 1.6–8.3)
NEUTROPHILS NFR BLD AUTO: 85 %
NRBC # BLD AUTO: 0 10E3/UL
NRBC BLD AUTO-RTO: 0 /100
PLATELET # BLD AUTO: 253 10E3/UL (ref 150–450)
POTASSIUM SERPL-SCNC: 4.1 MMOL/L (ref 3.4–5.3)
PROCALCITONIN SERPL IA-MCNC: 0.13 NG/ML
RBC # BLD AUTO: 3.33 10E6/UL (ref 3.8–5.2)
SODIUM SERPL-SCNC: 137 MMOL/L (ref 135–145)
WBC # BLD AUTO: 26.3 10E3/UL (ref 4–11)

## 2023-10-14 PROCEDURE — 80048 BASIC METABOLIC PNL TOTAL CA: CPT | Performed by: NURSE PRACTITIONER

## 2023-10-14 PROCEDURE — 83605 ASSAY OF LACTIC ACID: CPT | Performed by: NURSE PRACTITIONER

## 2023-10-14 PROCEDURE — 250N000011 HC RX IP 250 OP 636: Performed by: NURSE PRACTITIONER

## 2023-10-14 PROCEDURE — 710N000010 HC RECOVERY PHASE 1, LEVEL 2, PER MIN: Performed by: LEGAL MEDICINE

## 2023-10-14 PROCEDURE — 250N000009 HC RX 250: Performed by: LEGAL MEDICINE

## 2023-10-14 PROCEDURE — 272N000001 HC OR GENERAL SUPPLY STERILE: Performed by: LEGAL MEDICINE

## 2023-10-14 PROCEDURE — 99100 ANES PT EXTEME AGE<1 YR&>70: CPT | Performed by: NURSE ANESTHETIST, CERTIFIED REGISTERED

## 2023-10-14 PROCEDURE — 360N000075 HC SURGERY LEVEL 2, PER MIN: Performed by: LEGAL MEDICINE

## 2023-10-14 PROCEDURE — 99285 EMERGENCY DEPT VISIT HI MDM: CPT | Mod: 25 | Performed by: NURSE PRACTITIONER

## 2023-10-14 PROCEDURE — 87040 BLOOD CULTURE FOR BACTERIA: CPT | Performed by: NURSE PRACTITIONER

## 2023-10-14 PROCEDURE — 250N000011 HC RX IP 250 OP 636: Mod: JZ | Performed by: NURSE PRACTITIONER

## 2023-10-14 PROCEDURE — 370N000017 HC ANESTHESIA TECHNICAL FEE, PER MIN: Performed by: LEGAL MEDICINE

## 2023-10-14 PROCEDURE — 74177 CT ABD & PELVIS W/CONTRAST: CPT | Mod: MG

## 2023-10-14 PROCEDURE — 250N000011 HC RX IP 250 OP 636: Performed by: NURSE ANESTHETIST, CERTIFIED REGISTERED

## 2023-10-14 PROCEDURE — 258N000003 HC RX IP 258 OP 636: Performed by: NURSE PRACTITIONER

## 2023-10-14 PROCEDURE — 85025 COMPLETE CBC W/AUTO DIFF WBC: CPT | Performed by: NURSE PRACTITIONER

## 2023-10-14 PROCEDURE — 84145 PROCALCITONIN (PCT): CPT | Performed by: NURSE PRACTITIONER

## 2023-10-14 PROCEDURE — 76857 US EXAM PELVIC LIMITED: CPT | Mod: 26 | Performed by: NURSE PRACTITIONER

## 2023-10-14 PROCEDURE — 88304 TISSUE EXAM BY PATHOLOGIST: CPT | Mod: 26 | Performed by: PATHOLOGY

## 2023-10-14 PROCEDURE — 36415 COLL VENOUS BLD VENIPUNCTURE: CPT | Performed by: NURSE PRACTITIONER

## 2023-10-14 PROCEDURE — 76857 US EXAM PELVIC LIMITED: CPT | Mod: TC | Performed by: NURSE PRACTITIONER

## 2023-10-14 PROCEDURE — 96361 HYDRATE IV INFUSION ADD-ON: CPT | Performed by: NURSE PRACTITIONER

## 2023-10-14 PROCEDURE — 88304 TISSUE EXAM BY PATHOLOGIST: CPT | Mod: TC | Performed by: LEGAL MEDICINE

## 2023-10-14 PROCEDURE — 250N000013 HC RX MED GY IP 250 OP 250 PS 637: Performed by: LEGAL MEDICINE

## 2023-10-14 PROCEDURE — 46040 I&D ISCHIORCT&/PERIRCT ABSC: CPT | Performed by: LEGAL MEDICINE

## 2023-10-14 PROCEDURE — 87075 CULTR BACTERIA EXCEPT BLOOD: CPT | Performed by: LEGAL MEDICINE

## 2023-10-14 PROCEDURE — 250N000011 HC RX IP 250 OP 636: Mod: JZ | Performed by: LEGAL MEDICINE

## 2023-10-14 PROCEDURE — 258N000003 HC RX IP 258 OP 636: Performed by: NURSE ANESTHETIST, CERTIFIED REGISTERED

## 2023-10-14 PROCEDURE — 999N000157 HC STATISTIC RCP TIME EA 10 MIN

## 2023-10-14 PROCEDURE — 87077 CULTURE AEROBIC IDENTIFY: CPT | Performed by: LEGAL MEDICINE

## 2023-10-14 PROCEDURE — 87040 BLOOD CULTURE FOR BACTERIA: CPT | Mod: 91 | Performed by: LEGAL MEDICINE

## 2023-10-14 PROCEDURE — 96365 THER/PROPH/DIAG IV INF INIT: CPT | Mod: XU | Performed by: NURSE PRACTITIONER

## 2023-10-14 PROCEDURE — 96367 TX/PROPH/DG ADDL SEQ IV INF: CPT | Performed by: NURSE PRACTITIONER

## 2023-10-14 PROCEDURE — 250N000009 HC RX 250: Performed by: NURSE ANESTHETIST, CERTIFIED REGISTERED

## 2023-10-14 PROCEDURE — 46040 I&D ISCHIORCT&/PERIRCT ABSC: CPT | Performed by: NURSE ANESTHETIST, CERTIFIED REGISTERED

## 2023-10-14 RX ORDER — PROPOFOL 10 MG/ML
INJECTION, EMULSION INTRAVENOUS CONTINUOUS PRN
Status: DISCONTINUED | OUTPATIENT
Start: 2023-10-14 | End: 2023-10-14

## 2023-10-14 RX ORDER — ONDANSETRON 2 MG/ML
4 INJECTION INTRAMUSCULAR; INTRAVENOUS EVERY 6 HOURS PRN
Status: DISCONTINUED | OUTPATIENT
Start: 2023-10-14 | End: 2023-10-15 | Stop reason: HOSPADM

## 2023-10-14 RX ORDER — IOPAMIDOL 755 MG/ML
100 INJECTION, SOLUTION INTRAVASCULAR ONCE
Status: COMPLETED | OUTPATIENT
Start: 2023-10-14 | End: 2023-10-14

## 2023-10-14 RX ORDER — BISACODYL 10 MG
10 SUPPOSITORY, RECTAL RECTAL DAILY PRN
Status: DISCONTINUED | OUTPATIENT
Start: 2023-10-14 | End: 2023-10-15 | Stop reason: HOSPADM

## 2023-10-14 RX ORDER — GLYCOPYRROLATE 0.2 MG/ML
INJECTION, SOLUTION INTRAMUSCULAR; INTRAVENOUS PRN
Status: DISCONTINUED | OUTPATIENT
Start: 2023-10-14 | End: 2023-10-14

## 2023-10-14 RX ORDER — BISACODYL 5 MG
10 TABLET, DELAYED RELEASE (ENTERIC COATED) ORAL DAILY PRN
Status: DISCONTINUED | OUTPATIENT
Start: 2023-10-14 | End: 2023-10-15

## 2023-10-14 RX ORDER — CARVEDILOL 3.12 MG/1
3.12 TABLET ORAL 2 TIMES DAILY WITH MEALS
Status: DISCONTINUED | OUTPATIENT
Start: 2023-10-15 | End: 2023-10-15 | Stop reason: HOSPADM

## 2023-10-14 RX ORDER — METRONIDAZOLE 500 MG/100ML
500 INJECTION, SOLUTION INTRAVENOUS ONCE
Status: COMPLETED | OUTPATIENT
Start: 2023-10-14 | End: 2023-10-14

## 2023-10-14 RX ORDER — POLYETHYLENE GLYCOL 3350 17 G/17G
17 POWDER, FOR SOLUTION ORAL DAILY
Status: DISCONTINUED | OUTPATIENT
Start: 2023-10-15 | End: 2023-10-15 | Stop reason: HOSPADM

## 2023-10-14 RX ORDER — SODIUM CHLORIDE, SODIUM LACTATE, POTASSIUM CHLORIDE, CALCIUM CHLORIDE 600; 310; 30; 20 MG/100ML; MG/100ML; MG/100ML; MG/100ML
INJECTION, SOLUTION INTRAVENOUS CONTINUOUS PRN
Status: DISCONTINUED | OUTPATIENT
Start: 2023-10-14 | End: 2023-10-14

## 2023-10-14 RX ORDER — ACETAMINOPHEN 325 MG/1
975 TABLET ORAL EVERY 8 HOURS
Status: DISCONTINUED | OUTPATIENT
Start: 2023-10-14 | End: 2023-10-15

## 2023-10-14 RX ORDER — LIDOCAINE 40 MG/G
CREAM TOPICAL
Status: DISCONTINUED | OUTPATIENT
Start: 2023-10-14 | End: 2023-10-15 | Stop reason: HOSPADM

## 2023-10-14 RX ORDER — KETAMINE HYDROCHLORIDE 10 MG/ML
INJECTION INTRAMUSCULAR; INTRAVENOUS PRN
Status: DISCONTINUED | OUTPATIENT
Start: 2023-10-14 | End: 2023-10-14

## 2023-10-14 RX ORDER — PIPERACILLIN SODIUM, TAZOBACTAM SODIUM 4; .5 G/20ML; G/20ML
4.5 INJECTION, POWDER, LYOPHILIZED, FOR SOLUTION INTRAVENOUS ONCE
Status: COMPLETED | OUTPATIENT
Start: 2023-10-14 | End: 2023-10-14

## 2023-10-14 RX ORDER — PIPERACILLIN SODIUM, TAZOBACTAM SODIUM 2; .25 G/10ML; G/10ML
2.25 INJECTION, POWDER, LYOPHILIZED, FOR SOLUTION INTRAVENOUS EVERY 6 HOURS
Status: DISCONTINUED | OUTPATIENT
Start: 2023-10-15 | End: 2023-10-15 | Stop reason: HOSPADM

## 2023-10-14 RX ORDER — BUPIVACAINE HYDROCHLORIDE AND EPINEPHRINE 2.5; 5 MG/ML; UG/ML
INJECTION, SOLUTION EPIDURAL; INFILTRATION; INTRACAUDAL; PERINEURAL PRN
Status: DISCONTINUED | OUTPATIENT
Start: 2023-10-14 | End: 2023-10-14 | Stop reason: HOSPADM

## 2023-10-14 RX ORDER — ONDANSETRON 4 MG/1
4 TABLET, ORALLY DISINTEGRATING ORAL EVERY 6 HOURS PRN
Status: DISCONTINUED | OUTPATIENT
Start: 2023-10-14 | End: 2023-10-15 | Stop reason: HOSPADM

## 2023-10-14 RX ORDER — ACETAMINOPHEN 325 MG/1
650 TABLET ORAL EVERY 4 HOURS PRN
Status: DISCONTINUED | OUTPATIENT
Start: 2023-10-17 | End: 2023-10-15

## 2023-10-14 RX ORDER — ASPIRIN 81 MG/1
81 TABLET ORAL DAILY
Status: DISCONTINUED | OUTPATIENT
Start: 2023-10-15 | End: 2023-10-15 | Stop reason: HOSPADM

## 2023-10-14 RX ORDER — LISINOPRIL 5 MG/1
5 TABLET ORAL DAILY
Status: DISCONTINUED | OUTPATIENT
Start: 2023-10-15 | End: 2023-10-15 | Stop reason: HOSPADM

## 2023-10-14 RX ORDER — DEXMEDETOMIDINE HYDROCHLORIDE 4 UG/ML
INJECTION, SOLUTION INTRAVENOUS PRN
Status: DISCONTINUED | OUTPATIENT
Start: 2023-10-14 | End: 2023-10-14

## 2023-10-14 RX ORDER — ACETAMINOPHEN 325 MG/1
1000 TABLET ORAL EVERY 6 HOURS PRN
Status: DISCONTINUED | OUTPATIENT
Start: 2023-10-14 | End: 2023-10-14

## 2023-10-14 RX ORDER — SENNOSIDES A AND B 8.6 MG/1
2 TABLET, FILM COATED ORAL EVERY MORNING
Status: DISCONTINUED | OUTPATIENT
Start: 2023-10-15 | End: 2023-10-14

## 2023-10-14 RX ORDER — FUROSEMIDE 20 MG
20 TABLET ORAL DAILY
Status: DISCONTINUED | OUTPATIENT
Start: 2023-10-15 | End: 2023-10-15 | Stop reason: HOSPADM

## 2023-10-14 RX ORDER — PROCHLORPERAZINE MALEATE 5 MG
5 TABLET ORAL EVERY 6 HOURS PRN
Status: DISCONTINUED | OUTPATIENT
Start: 2023-10-14 | End: 2023-10-15 | Stop reason: HOSPADM

## 2023-10-14 RX ORDER — LIDOCAINE HYDROCHLORIDE 20 MG/ML
INJECTION, SOLUTION INFILTRATION; PERINEURAL PRN
Status: DISCONTINUED | OUTPATIENT
Start: 2023-10-14 | End: 2023-10-14

## 2023-10-14 RX ORDER — AMOXICILLIN 250 MG
1 CAPSULE ORAL 2 TIMES DAILY
Status: DISCONTINUED | OUTPATIENT
Start: 2023-10-14 | End: 2023-10-15 | Stop reason: HOSPADM

## 2023-10-14 RX ORDER — PROPOFOL 10 MG/ML
INJECTION, EMULSION INTRAVENOUS PRN
Status: DISCONTINUED | OUTPATIENT
Start: 2023-10-14 | End: 2023-10-14

## 2023-10-14 RX ORDER — FENTANYL CITRATE 50 UG/ML
INJECTION, SOLUTION INTRAMUSCULAR; INTRAVENOUS PRN
Status: DISCONTINUED | OUTPATIENT
Start: 2023-10-14 | End: 2023-10-14

## 2023-10-14 RX ADMIN — GLYCOPYRROLATE 0.1 MG: 0.2 INJECTION, SOLUTION INTRAMUSCULAR; INTRAVENOUS at 18:33

## 2023-10-14 RX ADMIN — METRONIDAZOLE 500 MG: 500 INJECTION, SOLUTION INTRAVENOUS at 16:42

## 2023-10-14 RX ADMIN — Medication 10 MG: at 18:27

## 2023-10-14 RX ADMIN — DEXMEDETOMIDINE 10 MCG: 100 INJECTION, SOLUTION, CONCENTRATE INTRAVENOUS at 18:41

## 2023-10-14 RX ADMIN — LIDOCAINE HYDROCHLORIDE 80 MG: 20 INJECTION, SOLUTION INFILTRATION; PERINEURAL at 18:25

## 2023-10-14 RX ADMIN — ACETAMINOPHEN 975 MG: 325 TABLET, FILM COATED ORAL at 21:16

## 2023-10-14 RX ADMIN — FENTANYL CITRATE 25 MCG: 50 INJECTION INTRAMUSCULAR; INTRAVENOUS at 18:30

## 2023-10-14 RX ADMIN — Medication 10 MG: at 18:42

## 2023-10-14 RX ADMIN — SODIUM CHLORIDE, POTASSIUM CHLORIDE, SODIUM LACTATE AND CALCIUM CHLORIDE: 600; 310; 30; 20 INJECTION, SOLUTION INTRAVENOUS at 18:22

## 2023-10-14 RX ADMIN — FENTANYL CITRATE 50 MCG: 50 INJECTION INTRAMUSCULAR; INTRAVENOUS at 18:24

## 2023-10-14 RX ADMIN — Medication 10 MG: at 18:31

## 2023-10-14 RX ADMIN — PIPERACILLIN AND TAZOBACTAM 4.5 G: 4; .5 INJECTION, POWDER, FOR SOLUTION INTRAVENOUS at 17:45

## 2023-10-14 RX ADMIN — PIPERACILLIN AND TAZOBACTAM 2.25 G: 2; .25 INJECTION, POWDER, FOR SOLUTION INTRAVENOUS at 23:37

## 2023-10-14 RX ADMIN — Medication 10 MG: at 18:38

## 2023-10-14 RX ADMIN — IOPAMIDOL 100 ML: 755 INJECTION, SOLUTION INTRAVENOUS at 15:43

## 2023-10-14 RX ADMIN — SENNOSIDES AND DOCUSATE SODIUM 1 TABLET: 50; 8.6 TABLET ORAL at 21:17

## 2023-10-14 RX ADMIN — Medication 10 MG: at 18:34

## 2023-10-14 RX ADMIN — FENTANYL CITRATE 25 MCG: 50 INJECTION INTRAMUSCULAR; INTRAVENOUS at 18:34

## 2023-10-14 RX ADMIN — SODIUM CHLORIDE, POTASSIUM CHLORIDE, SODIUM LACTATE AND CALCIUM CHLORIDE 1000 ML: 600; 310; 30; 20 INJECTION, SOLUTION INTRAVENOUS at 16:26

## 2023-10-14 RX ADMIN — PROPOFOL 75 MCG/KG/MIN: 10 INJECTION, EMULSION INTRAVENOUS at 18:26

## 2023-10-14 RX ADMIN — PROPOFOL 30 MG: 10 INJECTION, EMULSION INTRAVENOUS at 18:26

## 2023-10-14 ASSESSMENT — ENCOUNTER SYMPTOMS
RESPIRATORY NEGATIVE: 1
ENDOCRINE NEGATIVE: 1
NEUROLOGICAL NEGATIVE: 1
DIARRHEA: 0
EYES NEGATIVE: 1
VOMITING: 0
WOUND: 1
HEMATOLOGIC/LYMPHATIC NEGATIVE: 1
ALLERGIC/IMMUNOLOGIC NEGATIVE: 1
PSYCHIATRIC NEGATIVE: 1
MUSCULOSKELETAL NEGATIVE: 1
CONSTITUTIONAL NEGATIVE: 1
CARDIOVASCULAR NEGATIVE: 1
ABDOMINAL PAIN: 0
RECTAL PAIN: 1
CONSTIPATION: 0
NAUSEA: 0
BLOOD IN STOOL: 0

## 2023-10-14 ASSESSMENT — ACTIVITIES OF DAILY LIVING (ADL)
ADLS_ACUITY_SCORE: 35
ADLS_ACUITY_SCORE: 26
ADLS_ACUITY_SCORE: 35
ADLS_ACUITY_SCORE: 26
ADLS_ACUITY_SCORE: 35

## 2023-10-14 NOTE — DISCHARGE INSTRUCTIONS
Appointments: Please call Monday (10/16) to schedule a Hospital Follow-Up appointment with surgery clinic with in 5 days of discharge. Please call [phone] to schedule.          Anorectal Abscess Surgery: What to Expect at Home  Your Recovery  You had anorectal abscess surgery. This surgery drains a pocket of pus that has built up in the anal or rectal area.  Most of the pain that was caused by your abscess will probably go away right after surgery. But you may have some mild pain in your anal area from the incision for several days after the surgery. Most people can go back to work or their normal routine 1 or 2 days after surgery. It will probably take about 3 to 8 weeks for the abscess to completely heal.  Most people get better without any problems. But sometimes a tunnel can form between the old abscess and the outside of the body. This is called a fistula. Your doctor will check for this about 2 to 3 weeks after surgery. If you develop a fistula, the doctor will do surgery to repair the fistula.  This care sheet gives you a general idea about how long it will take for you to recover. But each person recovers at a different pace. Follow the steps below to get better as quickly as possible.  How can you care for yourself at home?  Activity    Rest when you feel tired. Getting enough sleep will help you recover.     Try to walk each day. Start by walking a little more than you did the day before. Bit by bit, increase the amount you walk. Walking boosts blood flow and helps prevent pneumonia and constipation.     Ask your doctor when you can drive again.     Most people are able to return to work 1 or 2 days after surgery.     You may shower. Let water run over the abscess area. This will help the abscess heal. Pat your anal area dry with a towel when you are done.   Diet    You can eat your normal diet. If your stomach is upset, try bland, low-fat foods like plain rice, broiled chicken, toast, and yogurt.      Drink plenty of fluids (unless your doctor tells you not to).     Eat a low-fiber diet for a couple days or as your doctor suggests. It is best to eat many small meals throughout the day. Add high-fiber foods a little at a time.     You may notice that your bowel movements are not regular right after your surgery. This is common. Try to avoid constipation and straining with bowel movements. If you have not had a bowel movement after a couple of days, ask your doctor about taking a mild laxative.   Medicines    Your doctor will tell you if and when you can restart your medicines. He or she will also give you instructions about taking any new medicines.     If you stopped taking aspirin or some other blood thinner, your doctor will tell you when to start taking it again.     Take pain medicines exactly as directed.  If the doctor gave you a prescription medicine for pain, take it as prescribed.  If you are not taking a prescription pain medicine, ask your doctor if you can take an over-the-counter medicine.     If your doctor prescribed antibiotics, take them as directed. Do not stop taking them just because you feel better. You need to take the full course of antibiotics.     If you think your pain medicine is making you sick to your stomach:  Take your medicine after meals (unless your doctor has told you not to).  Ask your doctor for a different pain medicine.   Incision care    Wash your anal area daily with warm, soapy water, and pat it dry. Don't use hydrogen peroxide or alcohol, which can slow healing. You may cover the area with a gauze bandage if it weeps or rubs against clothing. Change the bandage every day.     After a bowel movement, use a baby wipe or take a shower or sitz bath to gently clean the anal area.     If your doctor put gauze in your abscess during surgery, follow his or her instructions about when to remove it.   Other instructions    Place a maxi pad or gauze in your underwear to absorb  "drainage from your abscess while it heals.     Sit in a few inches of warm water (sitz bath) for 15 to 20 minutes 3 times a day. Do this as long as you have pain in your anal area.     Apply ice several times a day for 10 to 20 minutes at a time. Put a thin cloth between the ice and your skin.     Support your feet with a small step stool when you sit on the toilet. This helps flex your hips and places your pelvis in a squatting position. This can make bowel movements easier after surgery.     Try lying on your stomach with a pillow under your hips to decrease swelling.   Follow-up care is a key part of your treatment and safety. Be sure to make and go to all appointments, and call your doctor if you are having problems. It's also a good idea to know your test results and keep a list of the medicines you take.  When should you call for help?   Call 911 anytime you think you may need emergency care. For example, call if:    You passed out (lost consciousness).     You are short of breath.   Call your doctor now or seek immediate medical care if:    You are sick to your stomach and cannot drink fluids.     You have signs of a blood clot in your leg (called a deep vein thrombosis), such as:  Pain in your calf, back of the knee, thigh, or groin.  Redness and swelling in your leg or groin.     You have signs of infection, such as:  Increased pain, swelling, warmth, or redness.  Red streaks leading from the incision.  Pus draining from the incision.  A fever.     You cannot pass stools or gas.     Bright red blood has soaked through the bandage over your incision.     You have pain that does not get better after you take pain medicine.   Watch closely for any changes in your health, and be sure to contact your doctor if you have any problems.  Where can you learn more?  Go to https://www.healthwise.net/patiented  Enter H717 in the search box to learn more about \"Anorectal Abscess Surgery: What to Expect at Home.\"  Current " as of: November 30, 2022               Content Version: 13.7    9215-0509 [employer].   Care instructions adapted under license by your healthcare professional. If you have questions about a medical condition or this instruction, always ask your healthcare professional. [employer] disclaims any warranty or liability for your use of this information.          Post-Anesthesia Patient Instructions    IMMEDIATELY FOLLOWING SURGERY:  Do not drive or operate machinery for the first twenty four hours after surgery.  Do not make any important decisions for twenty four hours after surgery or while taking narcotic pain medications or sedatives.  If you develop intractable nausea and vomiting or a severe headache please notify your doctor immediately.    FOLLOW-UP:  Please make an appointment with your surgeon as instructed. You do not need to follow up with anesthesia unless specifically instructed to do so.    WOUND CARE INSTRUCTIONS (if applicable): Resume previous wound care from your previous drainage procedure.  Hot baths twice daily and as needed.  Additives to the bath water are allowed such as soap or salts.    QUESTIONS?:  Please feel free to call your physician or the hospital  if you have any questions, and they will be happy to assist you.

## 2023-10-14 NOTE — ANESTHESIA PREPROCEDURE EVALUATION
Anesthesia Pre-Procedure Evaluation    Patient: Any Selby   MRN: [id number] : 1945        Procedure :           Past Medical History:   Diagnosis Date     Refusal of blood transfusions as patient is Jew       Past Surgical History:   Procedure Laterality Date     COLONOSCOPY       CV CORONARY ANGIOGRAM  2018    NO stent needed     EYE MUSCLE SURGERY       TOTAL KNEE ARTHROPLASTY Right      TUBAL LIGATION        No Known Allergies   Social History     Tobacco Use     Smoking status: Never     Smokeless tobacco: Never   Substance Use Topics     Alcohol use: Not on file      Wt Readings from Last 1 Encounters:   10/11/23 78.8 kg (173 lb 12.8 oz)        Anesthesia Evaluation   Pt has had prior anesthetic. Type: General.    History of anesthetic complications  -  and PONV.  jehovah witness no blood products.    ROS/MED HX  ENT/Pulmonary:     (+) sleep apnea, mild, doesn't use CPAP,                                     Neurologic: Comment: Hx cognitive and behavioral changes       Cardiovascular:     (+) Dyslipidemia - -   -  - -      CHF  Last EF: 40                         Previous cardiac testing   Echo: Date: 10/23 Results:  Interpretation Summary  Left ventricular function is decreased. The ejection fraction is 45-50%  (mildly reduced).  Moderate to severe left ventricular dilation is present.  Global right ventricular function is normal.  Severe left atrial enlargement is present.  Trace to mild mitral insufficiency is present.  Aortic valve is normal in structure and function.  Trace tricuspid insufficiency is present.  Grade I or early diastolic dysfunction.    Stress Test:  Date: Results:    ECG Reviewed:  Date: 10/23 Results:  SR with frequent PVCs  Cath:  Date: Results:      METS/Exercise Tolerance: >4 METS    Hematologic:  - neg hematologic  ROS     Musculoskeletal:   (+)  arthritis,             GI/Hepatic:  - neg GI/hepatic ROS     Renal/Genitourinary:     (+) renal disease,  "type: CRI,            Endo:     (+)               Obesity,       Psychiatric/Substance Use:  - neg psychiatric ROS     Infectious Disease: Comment: Perianal abscess    (+) Recent Fever,           Malignancy:  - neg malignancy ROS     Other:  - neg other ROS    (+)  , H/O Chronic Pain,         Physical Exam    Airway        Mallampati: IV   TM distance: > 3 FB   Neck ROM: limited   Mouth opening: < 3 cm    Respiratory Devices and Support         Dental       (+) Modest Abnormalities - crowns, retainers, 1 or 2 missing teeth      Cardiovascular   cardiovascular exam normal       Rhythm and rate: regular and normal     Pulmonary   pulmonary exam normal        breath sounds clear to auscultation       OUTSIDE LABS:  CBC:   Lab Results   Component Value Date    WBC 26.3 (H) 10/14/2023    WBC 9.2 10/10/2023    HGB 10.8 (L) 10/14/2023    HGB 12.4 10/10/2023    HCT 33.3 (L) 10/14/2023    HCT 40.2 10/10/2023     10/14/2023     10/10/2023     BMP:   Lab Results   Component Value Date     10/14/2023     10/10/2023    POTASSIUM 4.1 10/14/2023    POTASSIUM 4.1 10/10/2023    CHLORIDE 102 10/14/2023    CHLORIDE 104 10/10/2023    CO2 22 10/14/2023    CO2 25 10/10/2023    BUN 19.7 10/14/2023    BUN 24.4 (H) 10/10/2023    CR 1.26 (H) 10/14/2023    CR 1.37 (H) 10/10/2023    GLC 97 10/14/2023     (H) 10/10/2023     COAGS: No results found for: \"PTT\", \"INR\", \"FIBR\"  POC: No results found for: \"BGM\", \"HCG\", \"HCGS\"  HEPATIC:   Lab Results   Component Value Date    ALBUMIN 3.3 (L) 10/03/2023    PROTTOTAL 6.7 10/03/2023    ALT 5 10/03/2023    AST 11 10/03/2023    ALKPHOS 83 10/03/2023    BILITOTAL 0.4 10/03/2023     OTHER:   Lab Results   Component Value Date    LACT 1.5 10/14/2023    GEORGE 8.7 (L) 10/14/2023    MAG 2.0 10/02/2023    TSH 1.15 10/02/2023       Anesthesia Plan    ASA Status:  3    NPO Status:  NPO Appropriate    Anesthesia Type: MAC.     - Reason for MAC: straight local not clinically adequate "   Induction: Intravenous, Propofol.   Maintenance: Balanced.        Consents    Anesthesia Plan(s) and associated risks, benefits, and realistic alternatives discussed. Questions answered and patient/representative(s) expressed understanding.     - Discussed: Risks, Benefits and Alternatives for BOTH SEDATION and the PROCEDURE were discussed     - Discussed with:  Patient      - Extended Intubation/Ventilatory Support Discussed: No.      - Patient is DNR/DNI Status: No     Use of blood products discussed: No .     Postoperative Care    Pain management: IV analgesics, Multi-modal analgesia.   PONV prophylaxis: Ondansetron (or other 5HT-3), Dexamethasone or Solumedrol, Background Propofol Infusion     Comments:                Rachel Mejia, LESTER CRNA

## 2023-10-14 NOTE — ED PROVIDER NOTES
History     Chief Complaint   Patient presents with    Rectal/perineal Pain     HPI  Any Selby is a 77 year old individual with history of left-sided heart failure, aortic atherosclerosis, stage III chronic kidney disease, chronic pain, recent perianal/perirectal abscess with I&D, comes in for left buttock pain.  Patient had perianal/perirectal abscess drainage conducted did by Dr. Stu Francisco on 10/2/2023 and was discharged home on 10/5/2023.  Patient states has been doing well but over the past few days he has been having increased pain in the left buttock right at the I&D site.  Patient thinks she is developing an abscess again so comes in.  Denies fever or chills.  Has been using stool softeners so stools are normal.  No dysuria or hematuria.  No melena or hematochezia.  No obvious drainage from wound per patient.  Pain with sitting on the area.  Has been doing sitz bath's.    Allergies:  No Known Allergies    Problem List:    Patient Active Problem List    Diagnosis Date Noted    Perianal abscess 10/02/2023     Priority: Medium    Perirectal abscess 10/02/2023     Priority: Medium    Chronic systolic congestive heart failure (H) 09/26/2023     Priority: Medium    Cognitive and behavioral changes 09/26/2023     Priority: Medium    Vulvar abscess 09/26/2023     Priority: Medium    Stage 3a chronic kidney disease (H) 09/26/2023     Priority: Medium    Gout of multiple sites, unspecified cause, unspecified chronicity 09/26/2023     Priority: Medium    Mixed hyperlipidemia 09/26/2023     Priority: Medium    Primary osteoarthritis of both knees 09/26/2023     Priority: Medium    DDD (degenerative disc disease), lumbar 09/26/2023     Priority: Medium    Refusal of blood transfusions as patient is Caodaism 09/26/2023     Priority: Medium    Osteoarthritis of right knee 01/27/2022     Priority: Medium     Formatting of this note might be different from the original. Added automatically from  request for surgery [id number]      Osteopenia 10/31/2021     Priority: Medium    Anemia 08/23/2020     Priority: Medium    Pleuritic chest pain 08/20/2020     Priority: Medium    Right lower lobe pneumonia 08/20/2020     Priority: Medium    Acute nontraumatic kidney injury (H24) 01/30/2020     Priority: Medium    Hx of sepsis 01/30/2020     Priority: Medium     Formatting of this note might be different from the original. SEPTIC SHOCK [A41.9, R65.21]   HX OF SEPSIS [Z86.19]      Influenza 01/30/2020     Priority: Medium    Chronic kidney disease, stage 3a (H) 08/30/2019     Priority: Medium    Obstructive sleep apnea 01/24/2019     Priority: Medium     Formatting of this note might be different from the original. AHI 31.7, % < 90 % 0.0, ESS 1      Left heart failure (H) 01/04/2019     Priority: Medium    Atherosclerosis of aorta (H24) 12/07/2018     Priority: Medium    Arthritis 07/24/2014     Priority: Medium     Formatting of this note might be different from the original. Affecting hands, neck, shoulders and knees      Chronic pain 06/21/2012     Priority: Medium    Shoulder pain 06/14/2012     Priority: Medium     Formatting of this note might be different from the original. Bilateral. Chronic. Work related      Obesity 01/10/2011     Priority: Medium    Posterior calcaneal exostosis 01/10/2011     Priority: Medium        Past Medical History:    Past Medical History:   Diagnosis Date    Refusal of blood transfusions as patient is [employer]        Past Surgical History:    Past Surgical History:   Procedure Laterality Date    COLONOSCOPY      CV CORONARY ANGIOGRAM  2018    NO stent needed    EYE MUSCLE SURGERY      TOTAL KNEE ARTHROPLASTY Right     TUBAL LIGATION         Family History:    Family History   Problem Relation Age of Onset    Breast Cancer Paternal Aunt        Social History:  Marital Status:   [5]  Social History     Tobacco Use    Smoking status: Never    Smokeless tobacco: Never    Vaping Use    Vaping Use: Never used        Medications:    acetaminophen (TYLENOL) 500 MG tablet  aspirin 81 MG EC tablet  bisacodyl (DULCOLAX) 5 MG EC tablet  carvedilol (COREG) 6.25 MG tablet  colchicine (COLCRYS) 0.6 MG tablet  furosemide (LASIX) 20 MG tablet  lidocaine (XYLOCAINE) 5 % external ointment  lisinopril (ZESTRIL) 10 MG tablet  nifedipine 0.2% in white petrolatum 0.2 % OINT ointment  predniSONE (DELTASONE) 10 MG tablet  senna (SENOKOT) 8.6 MG tablet          Review of Systems   Constitutional: Negative.    HENT: Negative.     Eyes: Negative.    Respiratory: Negative.     Cardiovascular: Negative.    Gastrointestinal:  Positive for rectal pain (Left-sided perianal/perirectal pain). Negative for abdominal pain, blood in stool, constipation, diarrhea, nausea and vomiting.   Endocrine: Negative.    Genitourinary: Negative.    Musculoskeletal: Negative.    Skin:  Positive for wound (Wound to left perianal area).   Allergic/Immunologic: Negative.    Neurological: Negative.    Hematological: Negative.    Psychiatric/Behavioral: Negative.         Physical Exam   BP: 94/52  Pulse: 85  Temp: 99.8  F (37.7  C)  Resp: 18  SpO2: 96 %      GENERAL APPEARANCE:  The patient is a 77 year old well-developed, well-nourished individual in no acute distress that appears as stated age.  LUNGS:  Breathing is easy.  Breath sounds are equal and clear bilaterally.  No wheezes, rhonchi, or rales.  HEART:  Regular rate and rhythm with normal S1 and S2.  No murmurs, gallops, or rubs.  ABDOMEN:  Soft and rotund.  No mass, guarding, or rebound.  Left lower quadrant tenderness to palpation.  No organomegaly or hernia.  Bowel sounds are present.  No CVA tenderness or flank mass.  No abdominal bruits or thrills present upon auscultation/palpation.  GENITOURINARY: No anterior pelvic tenderness, hernia, mass noted upon palpation.    RECTAL:  Assessment completed with chaperone Marco Vivar RN present.  Left buttock with open wound  but no surrounding erythema or discharge.  Does have tenderness to palpation in this area.  NEUROLOGIC:  No focal sensory or motor deficits are noted.    PSYCHIATRIC:  The patient is awake, alert, and oriented x4.  Recent and remote memory is intact.  Appropriate mood and affect.  Calm and cooperative with history and physical exam.  SKIN:  Warm, dry, and well perfused.  Good turgor.  Open wound to left rectal area.  No surrounding erythema.  No drainage noted.        Comment: Discrepancies between my note and notes on behalf of the nursing team or other care providers are secondary to my findings reflecting my physical examination and questioning of the patient.  Any conflicting information provided is not in line with my examination of the patient.       ED Course              ED Course as of 10/14/23 1753   Sat Oct 14, 2023   1358 Labs ordered.   1400 In to see patient and history/physical completed.    1441 POCUS ultrasound was conducted showing no signs of obvious abscess or cellulitis.  Previous I&D tract present with no obvious foreign body.  With patient having left lower quadrant pain, did order CT abdomen pelvis with IV contrast.   1520 WBC(!): 26.3  WBC of 26.3.  Lactic acid, procalcitonin, and blood cultures ordered.  1 L LR ordered.   1615 Procalcitonin(!): 0.13  Elevated Pro-Kalen.  Zosyn 4.5 g and metronidazole 500 mg ordered   1638 CT shows recurrent 7.4 cm perirectal abscess.  For this reason discussed case with general surgeon Dr. Owusu who will come in and see patient.   1720 Dr. Owusu in to see patient.   1752 Patient to go to surgery.     POC US SOFT TISSUE    Date/Time: 10/14/2023 2:38 PM    Performed by: Major Guerra APRN CNP  Authorized by: Major Guerra APRN CNP    Procedure Details & Findings:      Limited Soft Tissue Ultrasound, performed and interpreted by me.    Indication:  pain. Evaluate for cellulitis vs abscess.     Body location: buttock    Findings:  There is no  cobblestoning suggestive of cellulitis in the evaluated area. There is no obvious fluid collection or abscess.  Tract winter is noted from previous I&D.  No foreign body identified    IMPRESSION: No cellulitis or abscess.  Track marks from previous I&D is noted with obvious foreign body                Results for orders placed or performed during the hospital encounter of 10/14/23 (from the past 24 hour(s))   Basic metabolic panel   Result Value Ref Range    Sodium 137 135 - 145 mmol/L    Potassium 4.1 3.4 - 5.3 mmol/L    Chloride 102 98 - 107 mmol/L    Carbon Dioxide (CO2) 22 22 - 29 mmol/L    Anion Gap 13 7 - 15 mmol/L    Urea Nitrogen 19.7 8.0 - 23.0 mg/dL    Creatinine 1.26 (H) 0.51 - 0.95 mg/dL    GFR Estimate 44 (L) >60 mL/min/1.73m2    Calcium 8.7 (L) 8.8 - 10.2 mg/dL    Glucose 97 70 - 99 mg/dL   Procalcitonin   Result Value Ref Range    Procalcitonin 0.13 (H) <0.05 ng/mL   CBC with platelets differential    Narrative    The following orders were created for panel order CBC with platelets differential.  Procedure                               Abnormality         Status                     ---------                               -----------         ------                     CBC with platelets and d...[[id number]]  Abnormal            Final result                 Please view results for these tests on the individual orders.   CBC with platelets and differential   Result Value Ref Range    WBC Count 26.3 (H) 4.0 - 11.0 10e3/uL    RBC Count 3.33 (L) 3.80 - 5.20 10e6/uL    Hemoglobin 10.8 (L) 11.7 - 15.7 g/dL    Hematocrit 33.3 (L) 35.0 - 47.0 %     78 - 100 fL    MCH 32.4 26.5 - 33.0 pg    MCHC 32.4 31.5 - 36.5 g/dL    RDW 13.8 10.0 - 15.0 %    Platelet Count 253 150 - 450 10e3/uL    % Neutrophils 85 %    % Lymphocytes 8 %    % Monocytes 6 %    Mids % (Monos, Eos, Basos)      % Eosinophils 0 %    % Basophils 0 %    % Immature Granulocytes 1 %    NRBCs per 100 WBC 0 <1 /100    Absolute Neutrophils 22.3 (H)  1.6 - 8.3 10e3/uL    Absolute Lymphocytes 2.0 0.8 - 5.3 10e3/uL    Absolute Monocytes 1.6 (H) 0.0 - 1.3 10e3/uL    Mids Abs (Monos, Eos, Basos)      Absolute Eosinophils 0.1 0.0 - 0.7 10e3/uL    Absolute Basophils 0.1 0.0 - 0.2 10e3/uL    Absolute Immature Granulocytes 0.2 <=0.4 10e3/uL    Absolute NRBCs 0.0 10e3/uL   CT Abdomen Pelvis w Contrast    Narrative    PROCEDURE:  CT ABDOMEN PELVIS W CONTRAST    HISTORY: Left buttock pain,  Recent I&D perianal abcess    TECHNIQUE: Helical CT of the abdomen and pelvis was performed  following injection of intravenous contrast. This CT exam was  performed using one or more the following dose reduction techniques:  automated exposure control, adjustment of the mA and/or kV according  to patient size, and/or iterative reconstruction technique.    COMPARISON: 1/2/2023    MEDS/CONTRAST: Isovue 370 84ml    FINDINGS:      Limited images through the lung bases demonstrate no focal  consolidation or mass. There is a small hiatal hernia.    There is a recurrent left renal abscess spanning 7.4 x 4.4 x 4.7 cm,  slightly larger when compared to 10/2/2023. The relationship of this  abscess seen in the internal sphincter and pelvic floor is unclear. No  gross intraperitoneal fluid or abscess is seen. Involvement of the  levator is not excluded.    The liver, gallbladder, spleen, pancreas and adrenal glands are  stable. Symmetric nephrograms are present without hydronephrosis.  There is no abdominal aortic aneurysm.    The bowel is normal in caliber. .    No suspicious osseous lesions are identified.      Impression    IMPRESSION:      Recurrent 7.4 cm left perianal abscess.    MADISON LAM MD         SYSTEM ID:  [id number]   Lactic acid whole blood   Result Value Ref Range    Lactic Acid 1.5 0.7 - 2.0 mmol/L       Medications   piperacillin-tazobactam (ZOSYN) 4.5 g vial to attach to  mL bag (4.5 g Intravenous $New Bag 10/14/23 2854)   iopamidol (ISOVUE-370) solution 100 mL  (100 mLs Intravenous $Given 10/14/23 1543)   sodium chloride (PF) 0.9% PF flush 60 mL (50 mLs Intravenous $Given 10/14/23 1543)   lactated ringers BOLUS 1,000 mL (0 mLs Intravenous Stopped 10/14/23 1748)   metroNIDAZOLE (FLAGYL) infusion 500 mg (0 mg Intravenous Stopped 10/14/23 1748)       Assessments & Plan (with Medical Decision Making)     I have reviewed the nursing notes.    I have reviewed the findings, diagnosis, plan and need for follow up with the patient.      Summary:  Patient presents to the ER today for left buttock pain.  Potential diagnosis which have been considered and evaluated include abscess, colitis, diverticulitis, as well as others. Many of these have been excluded using the various modalities and assessment as noted on the chart. At the present time, the diagnosis given seems to be the most likely sepsis from perirectal abscess.  Upon arrival, vitals signs show blood pressure 94/52 with a pulse of 85.  Temperature 37.7  C.  Respirations 18 with oxygenation 96% on room air.  The patient is alert and oriented.  Cardiac and respiratory examination normal.  Left lower quadrant tenderness to palpation noted but no hernia or mass.  No CVA tenderness.  Left intergluteal cleft with I&D area that is still open.  No drainage or surrounding erythema.  Area is tender to palpation.  POCUS was conducted of the soft tissues showing no abscess or signs of cellulitis but does have I&D tract with no obvious foreign body.  Lab work was obtained showing WBC of 26.3 with hemoglobin of 10.8.  Electrolytes and renal functions benign.  Procalcitonin 0.13 with lactic acid 1.5.  Blood culture pending.  Due to left lower quadrant pain and worsening tenderness in the left gluteal fold, did do CT of abdomen pelvis with IV contrast which showed recurrent left-sided 7.4 cm perirectal abscess.  Did initiate Zosyn 4.5 g and Flagyl 500 mg 4 likely sepsis from perirectal abscess.  Discussed case with general surgeon   Francoise.  Dr. Owusu in to see patient.  Patient to surgery.    Critical Care Time: None    Impression and plan discussed with patient. Questions answered, concerns addressed, indications for urgent re-evaluation reviewed, and  given. Patient/Parent/Caregiver agree with treatment plan and have no further questions at this time.      This note was created by the Dragon Voice Dictation System. Inadvertent typographical errors, due to software recognition problems, may still exist.             New Prescriptions    No medications on file       Final diagnoses:   Perirectal abscess   Sepsis (H)       10/14/2023   [facility]       Major Guerra, LESTER CNP  10/14/23 3722

## 2023-10-14 NOTE — H&P
[facility] History and Physical    Anychristy Selby MRN# [id number]   Age: 77 year old YOB: 1945     Date of Admission:  10/14/2023                Chief Complaint:   Left perirectal pain.  She feels like her previously drained left perirectal abscess has returned.    History is obtained from the patient and patient's daughter         History of Present Illness:   This patient is a 77 year old woman who presents with recurrence of her left perirectal abscess.  Has been developing increasing pain over the last couple of days.  It became severe enough that she presented to the emergency department.  Bedside ultrasound was unrevealing but subsequent CT scan showed a large recurrent left perirectal abscess.           Past Medical History:     Past Medical History:   Diagnosis Date    Refusal of blood transfusions as patient is [employer]ology    HTN. Gout.         Past Surgical History:     Past Surgical History:   Procedure Laterality Date    COLONOSCOPY      CV CORONARY ANGIOGRAM  2018    NO stent needed    EYE MUSCLE SURGERY      TOTAL KNEE ARTHROPLASTY Right     TUBAL LIGATION     Incision and drainage left perirectal abscess on October 2, 2023  Incision and drainage vulvar abscess which she said was done in [place].         Social History:     Social History     Tobacco Use    Smoking status: Never    Smokeless tobacco: Never   Substance Use Topics    Alcohol use: Not on file   Says she recently moved from [place].         Family History:     Family History   Problem Relation Age of Onset    Breast Cancer Paternal Aunt             Allergies:   No Known Allergies         Medications:     Current Facility-Administered Medications   Medication    piperacillin-tazobactam (ZOSYN) 4.5 g vial to attach to  mL bag     Current Outpatient Medications   Medication Sig    acetaminophen (TYLENOL) 500 MG tablet Take 500 mg by mouth every 8 hours as needed for pain . Do not exceed  4000mg a day of acetaminophen from any source.    aspirin 81 MG EC tablet Take 1 tablet (81 mg) by mouth daily    bisacodyl (DULCOLAX) 5 MG EC tablet Take 2 tablets by mouth daily as needed for constipation    carvedilol (COREG) 6.25 MG tablet Take 0.5 tablets (3.125 mg) by mouth 2 times daily (with meals)    colchicine (COLCRYS) 0.6 MG tablet 2 tablets at onset of gout and may repeat in 1 hour to improve gout at onset and may take 1 tablet daily for 5 more days if gout persists    furosemide (LASIX) 20 MG tablet Take 1 tablet (20 mg) by mouth daily    lidocaine (XYLOCAINE) 5 % external ointment Apply topically as needed (hemorrhoids)    lisinopril (ZESTRIL) 10 MG tablet Take 0.5 tablets (5 mg) by mouth daily    nifedipine 0.2% in white petrolatum 0.2 % OINT ointment Place rectally as needed (hemorrhoids)    predniSONE (DELTASONE) 10 MG tablet 4 tabs orally for 4 days then 2 tabs orally for 4 days then 1 tablet orally for 4 days    senna (SENOKOT) 8.6 MG tablet Take 2 tablets by mouth every morning             Review of Systems:   See above.  Recent history of vulvar abscess as well.         Physical Exam:   Vitals were reviewed  Temp: 98.1  F (36.7  C) Temp src: Oral BP: 136/66 Pulse: 84   Resp: 16 SpO2: 97 % O2 Device: None (Room air)    Alert and oriented.  Lungs clear.  Heart regular without obvious murmur.  Abdomen is soft and nontender with no organomegaly or palpable intra-abdominal masses.  Healing left gluteal wound from her recent incision and drainage.  Palpable underlying tender induration.         Data:     Lab Results   Component Value Date    WBC 26.3 (H) 10/14/2023    HGB 10.8 (L) 10/14/2023    HCT 33.3 (L) 10/14/2023     10/14/2023     10/14/2023    POTASSIUM 4.1 10/14/2023    CHLORIDE 102 10/14/2023    CO2 22 10/14/2023    BUN 19.7 10/14/2023    CR 1.26 (H) 10/14/2023    GLC 97 10/14/2023    NTBNPI 5,092 (H) 10/02/2023    AST 11 10/03/2023    ALT 5 10/03/2023    ALKPHOS 83 10/03/2023     BILITOTAL 0.4 10/03/2023          Attestation:  Total time: 55 minutes.    Brian Owusu MD

## 2023-10-14 NOTE — ED TRIAGE NOTES
Patient presents with c/o having a rectal abscess drained 10/2/23, was admitted to the hospital and discharged 10/5/89. Patient reports pain is back to what it was prior to having abscess drained.

## 2023-10-14 NOTE — ED NOTES
Unable to get complete set of blood cultures, attempted multiple times by primary nurse, lab staff, and provider.  Only able to obtain enough blood for a pediatric blood culture.   Antibiotics will be started per protocol despite having full sets of blood cultures.

## 2023-10-15 VITALS
HEIGHT: 65 IN | TEMPERATURE: 97.6 F | HEART RATE: 75 BPM | DIASTOLIC BLOOD PRESSURE: 49 MMHG | BODY MASS INDEX: 28.82 KG/M2 | RESPIRATION RATE: 18 BRPM | OXYGEN SATURATION: 91 % | WEIGHT: 173 LBS | SYSTOLIC BLOOD PRESSURE: 110 MMHG

## 2023-10-15 DIAGNOSIS — K61.2 ABSCESS OF ANAL AND RECTAL REGIONS: ICD-10-CM

## 2023-10-15 PROBLEM — A41.9 SEPSIS (H): Status: RESOLVED | Noted: 2023-10-14 | Resolved: 2023-10-15

## 2023-10-15 LAB
BASO+EOS+MONOS # BLD AUTO: ABNORMAL 10*3/UL
BASO+EOS+MONOS NFR BLD AUTO: ABNORMAL %
BASOPHILS # BLD AUTO: 0.1 10E3/UL (ref 0–0.2)
BASOPHILS NFR BLD AUTO: 1 %
EOSINOPHIL # BLD AUTO: 0.2 10E3/UL (ref 0–0.7)
EOSINOPHIL NFR BLD AUTO: 1 %
ERYTHROCYTE [DISTWIDTH] IN BLOOD BY AUTOMATED COUNT: 13.5 % (ref 10–15)
GLUCOSE BLDC GLUCOMTR-MCNC: 78 MG/DL (ref 70–99)
HCT VFR BLD AUTO: 28.1 % (ref 35–47)
HGB BLD-MCNC: 9.3 G/DL (ref 11.7–15.7)
IMM GRANULOCYTES # BLD: 0.1 10E3/UL
IMM GRANULOCYTES NFR BLD: 1 %
LYMPHOCYTES # BLD AUTO: 2 10E3/UL (ref 0.8–5.3)
LYMPHOCYTES NFR BLD AUTO: 13 %
MCH RBC QN AUTO: 32.6 PG (ref 26.5–33)
MCHC RBC AUTO-ENTMCNC: 33.1 G/DL (ref 31.5–36.5)
MCV RBC AUTO: 99 FL (ref 78–100)
MONOCYTES # BLD AUTO: 1.2 10E3/UL (ref 0–1.3)
MONOCYTES NFR BLD AUTO: 8 %
NEUTROPHILS # BLD AUTO: 12 10E3/UL (ref 1.6–8.3)
NEUTROPHILS NFR BLD AUTO: 76 %
NRBC # BLD AUTO: 0 10E3/UL
NRBC BLD AUTO-RTO: 0 /100
PLATELET # BLD AUTO: 276 10E3/UL (ref 150–450)
RBC # BLD AUTO: 2.85 10E6/UL (ref 3.8–5.2)
WBC # BLD AUTO: 15.5 10E3/UL (ref 4–11)

## 2023-10-15 PROCEDURE — 82962 GLUCOSE BLOOD TEST: CPT

## 2023-10-15 PROCEDURE — 250N000013 HC RX MED GY IP 250 OP 250 PS 637: Performed by: LEGAL MEDICINE

## 2023-10-15 PROCEDURE — 250N000011 HC RX IP 250 OP 636: Mod: JZ | Performed by: LEGAL MEDICINE

## 2023-10-15 PROCEDURE — 999N000157 HC STATISTIC RCP TIME EA 10 MIN

## 2023-10-15 PROCEDURE — 36415 COLL VENOUS BLD VENIPUNCTURE: CPT | Performed by: LEGAL MEDICINE

## 2023-10-15 PROCEDURE — 85004 AUTOMATED DIFF WBC COUNT: CPT | Performed by: LEGAL MEDICINE

## 2023-10-15 RX ORDER — NALOXONE HYDROCHLORIDE 0.4 MG/ML
0.4 INJECTION, SOLUTION INTRAMUSCULAR; INTRAVENOUS; SUBCUTANEOUS
Status: DISCONTINUED | OUTPATIENT
Start: 2023-10-15 | End: 2023-10-15 | Stop reason: HOSPADM

## 2023-10-15 RX ORDER — NALOXONE HYDROCHLORIDE 0.4 MG/ML
0.2 INJECTION, SOLUTION INTRAMUSCULAR; INTRAVENOUS; SUBCUTANEOUS
Status: DISCONTINUED | OUTPATIENT
Start: 2023-10-15 | End: 2023-10-15 | Stop reason: HOSPADM

## 2023-10-15 RX ORDER — HYDROCODONE BITARTRATE AND ACETAMINOPHEN 10; 325 MG/1; MG/1
1 TABLET ORAL ONCE
Status: DISCONTINUED | OUTPATIENT
Start: 2023-10-15 | End: 2024-04-17

## 2023-10-15 RX ORDER — HYDROCODONE BITARTRATE AND ACETAMINOPHEN 5; 325 MG/1; MG/1
1-2 TABLET ORAL EVERY 6 HOURS PRN
Status: DISCONTINUED | OUTPATIENT
Start: 2023-10-15 | End: 2023-10-15 | Stop reason: HOSPADM

## 2023-10-15 RX ORDER — HYDROCODONE BITARTRATE AND ACETAMINOPHEN 5; 325 MG/1; MG/1
1 TABLET ORAL EVERY 4 HOURS PRN
Qty: 10 TABLET | Refills: 0 | Status: SHIPPED | OUTPATIENT
Start: 2023-10-15 | End: 2023-10-18

## 2023-10-15 RX ORDER — AMOXICILLIN AND CLAVULANATE POTASSIUM 400; 57 MG/5ML; MG/5ML
800 POWDER, FOR SUSPENSION ORAL 2 TIMES DAILY
Qty: 100 ML | Refills: 0 | Status: SHIPPED | OUTPATIENT
Start: 2023-10-15 | End: 2023-10-20

## 2023-10-15 RX ORDER — HYDROCODONE BITARTRATE AND ACETAMINOPHEN 10; 325 MG/15ML; MG/15ML
5 SOLUTION ORAL EVERY 4 HOURS PRN
Qty: 120 ML | Refills: 0 | Status: SHIPPED | OUTPATIENT
Start: 2023-10-15 | End: 2024-04-17

## 2023-10-15 RX ORDER — BISACODYL 5 MG
10 TABLET, DELAYED RELEASE (ENTERIC COATED) ORAL DAILY PRN
Status: DISCONTINUED | OUTPATIENT
Start: 2023-10-15 | End: 2023-10-15 | Stop reason: HOSPADM

## 2023-10-15 RX ADMIN — SENNOSIDES AND DOCUSATE SODIUM 1 TABLET: 50; 8.6 TABLET ORAL at 09:24

## 2023-10-15 RX ADMIN — CARVEDILOL 3.12 MG: 3.12 TABLET, FILM COATED ORAL at 09:24

## 2023-10-15 RX ADMIN — LISINOPRIL 5 MG: 5 TABLET ORAL at 09:24

## 2023-10-15 RX ADMIN — HYDROCODONE BITARTRATE AND ACETAMINOPHEN 2 TABLET: 5; 325 TABLET ORAL at 03:28

## 2023-10-15 RX ADMIN — PIPERACILLIN AND TAZOBACTAM 2.25 G: 2; .25 INJECTION, POWDER, FOR SOLUTION INTRAVENOUS at 05:12

## 2023-10-15 RX ADMIN — FUROSEMIDE 20 MG: 20 TABLET ORAL at 09:24

## 2023-10-15 RX ADMIN — ASPIRIN 81 MG: 81 TABLET, COATED ORAL at 09:24

## 2023-10-15 ASSESSMENT — ACTIVITIES OF DAILY LIVING (ADL)
ADLS_ACUITY_SCORE: 26

## 2023-10-15 NOTE — PLAN OF CARE
"      ICU Shift Summary        Neuro: patient is alert and oriented x4, neuros intact, difficulty in hearing    Cardiac: BP soft, heart rate pop    Resp: lungs clear, on room air    GI/: bowel sounds hypoactive, regular diet    Musculoskeletal/skin: patient uses cane, walker was provided for use in the room. Patient is up with ao1 and walker. No skin issues besides surgical incision on buttocks.    Lines: right wrist      Patient arrived around 2000 from PACU, being admitted for observation over night and than discharge tomorrow.  Patient had increased pain over the night, notified surgeon, and norco prn was ordered, patient has not complained of pain since.  Will update oncoming nurse.    Vital signs:  Temp: 99  F (37.2  C) Temp src: Tympanic BP: 108/60 Pulse: 56   Resp: 18 SpO2: 94 % O2 Device: None (Room air)   Height: 165.1 cm (5' 5\") Weight: 78.5 kg (173 lb)  Estimated body mass index is 28.79 kg/m  as calculated from the following:    Height as of this encounter: 1.651 m (5' 5\").    Weight as of this encounter: 78.5 kg (173 lb).        "

## 2023-10-15 NOTE — OR NURSING
Patient transferred to med/surg via cart. Evelia score 10/10. Pain level 4/10. Urine output NA. Denies nausea. Pt awake and alert, able to rest comfortably. Daughter at bedside for most of PACU time. Daughter reports that she was given update by surgeon post op. Iodoform packing visualized with wound check. New ABD applied over incision prior to transfer out of PACU. Belongings bag and cane remain with patient. Hand off report given to Efraín RAMIREZ at bedside.

## 2023-10-15 NOTE — DISCHARGE SUMMARY
[facility] Discharge Summary    Any Selby MRN# [id number]   Age: 77 year old YOB: 1945     Date of Admission:  10/14/2023  Date of Discharge::  10/15/2023  Admitting Physician:  Brian Owusu MD  Discharge Physician:  Brian Owusu MD             Admission Diagnoses:   Perirectal abscess [K61.1]  Sepsis (H) [A41.9]          Discharge Diagnosis:     Ellen-rectal abscess [K61.1]          Procedures:     Procedure(s): Incision and drainage of recurrent left perirectal abscess on October 14, 2023.              Medications Prior to Admission:     Medications Prior to Admission   Medication Sig Dispense Refill Last Dose    aspirin 81 MG EC tablet Take 1 tablet (81 mg) by mouth daily 90 tablet 3 10/14/2023    carvedilol (COREG) 6.25 MG tablet Take 0.5 tablets (3.125 mg) by mouth 2 times daily (with meals) 90 tablet 3 10/14/2023    furosemide (LASIX) 20 MG tablet Take 1 tablet (20 mg) by mouth daily 90 tablet 3 10/13/2023    lisinopril (ZESTRIL) 10 MG tablet Take 0.5 tablets (5 mg) by mouth daily 90 tablet 3 10/14/2023    senna (SENOKOT) 8.6 MG tablet Take 2 tablets by mouth every morning 180 tablet 3     bisacodyl (DULCOLAX) 5 MG EC tablet Take 2 tablets by mouth daily as needed for constipation       colchicine (COLCRYS) 0.6 MG tablet 2 tablets at onset of gout and may repeat in 1 hour to improve gout at onset and may take 1 tablet daily for 5 more days if gout persists 15 tablet 1     lidocaine (XYLOCAINE) 5 % external ointment Apply topically as needed (hemorrhoids)       nifedipine 0.2% in white petrolatum 0.2 % OINT ointment Place rectally as needed (hemorrhoids)       predniSONE (DELTASONE) 10 MG tablet 4 tabs orally for 4 days then 2 tabs orally for 4 days then 1 tablet orally for 4 days 28 tablet 1     [DISCONTINUED] acetaminophen (TYLENOL) 500 MG tablet Take 500 mg by mouth every 8 hours as needed for pain . Do not exceed 4000mg a day of acetaminophen from any  source.                Discharge Medications:     Current Discharge Medication List        START taking these medications    Details   amoxicillin-clavulanate (AUGMENTIN) 400-57 MG/5ML suspension Take 10 mLs (800 mg) by mouth 2 times daily for 5 days  Qty: 100 mL, Refills: 0  Take until gone.    Associated Diagnoses: Abscess of anal and rectal regions      HYDROcodone-Acetaminophen  MG/15ML SOLN Take 5 mLs by mouth every 4 hours as needed for pain  Qty: 120 mL, Refills: 0    Associated Diagnoses: Abscess of anal and rectal regions           CONTINUE these medications which have NOT CHANGED    Details   aspirin 81 MG EC tablet Take 1 tablet (81 mg) by mouth daily  Qty: 90 tablet, Refills: 3    Associated Diagnoses: Atherosclerosis of aorta (H24)      carvedilol (COREG) 6.25 MG tablet Take 0.5 tablets (3.125 mg) by mouth 2 times daily (with meals)  Qty: 90 tablet, Refills: 3    Associated Diagnoses: Cognitive and behavioral changes      furosemide (LASIX) 20 MG tablet Take 1 tablet (20 mg) by mouth daily  Qty: 90 tablet, Refills: 3    Associated Diagnoses: Cognitive and behavioral changes      lisinopril (ZESTRIL) 10 MG tablet Take 0.5 tablets (5 mg) by mouth daily  Qty: 90 tablet, Refills: 3    Associated Diagnoses: Cognitive and behavioral changes      senna (SENOKOT) 8.6 MG tablet Take 2 tablets by mouth every morning  Qty: 180 tablet, Refills: 3    Associated Diagnoses: Constipation, unspecified constipation type      bisacodyl (DULCOLAX) 5 MG EC tablet Take 2 tablets by mouth daily as needed for constipation      colchicine (COLCRYS) 0.6 MG tablet 2 tablets at onset of gout and may repeat in 1 hour to improve gout at onset and may take 1 tablet daily for 5 more days if gout persists  Qty: 15 tablet, Refills: 1    Associated Diagnoses: Gout of multiple sites, unspecified cause, unspecified chronicity      lidocaine (XYLOCAINE) 5 % external ointment Apply topically as needed (hemorrhoids)      nifedipine 0.2%  in white petrolatum 0.2 % OINT ointment Place rectally as needed (hemorrhoids)      predniSONE (DELTASONE) 10 MG tablet 4 tabs orally for 4 days then 2 tabs orally for 4 days then 1 tablet orally for 4 days  Qty: 28 tablet, Refills: 1    Associated Diagnoses: Gout of multiple sites, unspecified cause, unspecified chronicity           STOP taking these medications       acetaminophen (TYLENOL) 500 MG tablet Comments:   Reason for Stopping: Resume your usual home acetaminophen dose once you quit taking the liquid pain prescription.                  Brief History of Illness:   77-year-old woman with recurrent left gluteal pain presented to the emergency department.  Had an I&D of left perirectal abscess a couple of weeks ago.  CT scan showed this had recurred.           Hospital Course:   The patient was taken to the operating room on the evening of admission where a repeat incision and drainage of that recurrent left perirectal abscess was performed successfully.  The patient's hospital course was unremarkable.  She recovered as anticipated and experienced no post-operative complications.  Iodoform gauze packing was removed prior to discharge with no evidence of active bleeding.          Discharge Instructions and Follow-Up:     Discharge diet: Regular   Discharge activity: Activity as tolerated   Discharge follow-up: Follow-up in the surgery clinic this week.  Follow-up with primary care as previously scheduled.   Wound care: Resume hot sitz bath's twice a day and as needed.           Discharge Disposition:     Discharged to home in the care of her family.      Attestation:  Face-to-face time: 10 minutes      Brian Owusu MD

## 2023-10-15 NOTE — PLAN OF CARE
"Discharged home. All questions answered prior to discharge.     Vital signs:  Temp: 97.6  F (36.4  C) Temp src: Tympanic BP: (!) 110/49 Pulse: 75   Resp: 18 SpO2: (!) 91 % O2 Device: None (Room air)   Height: 165.1 cm (5' 5\") Weight: 78.5 kg (173 lb)  Estimated body mass index is 28.79 kg/m  as calculated from the following:    Height as of this encounter: 1.651 m (5' 5\").    Weight as of this encounter: 78.5 kg (173 lb).        "

## 2023-10-15 NOTE — OP NOTE
Preop diagnosis: Recurrent left perirectal abscess.    Postop diagnosis: Same.    Procedure: Incision and drainage of recurrent left perirectal abscess.  Cultures taken.    Indications: 77-year-old woman who had a left perirectal abscess drained surgically on October 2.  Within the last couple of days has developed recurrent symptoms and presented to the emergency department where a recurrent left perirectal abscess was identified on CT scan.  Repeat incision and drainage is recommended.    Findings: Large recurrent abscess cavity in the area seen on CT scan in the left perirectal location, estimated to be at least 200 cc in volume.  Aerobic and anaerobic cultures were taken.    EBL: Approximately 20 cc.    Technique: The patient was taken to the operating room where she was placed in the supine lithotomy position under monitored anesthesia care.  After appropriate timeout, the buttocks area and midline cleft were prepped and draped in the usual sterile fashion.  0.25% bupivacaine with epinephrine was used to anesthetize the skin and subcutaneous tissues surrounding her previous, healing, I&D site.  An elliptical incision was made to excise that previous wound.  This specimen was sent to pathology for evaluation.  The dissection was carried into the deeper subcutaneous plane using electrocautery to ensure simultaneous hemostasis.  The abscess cavity was entered without difficulty.  Cultures were obtained from within the cavity.  The cavity extended anteriorly from the entry site so the skin was incised slightly in an anterior direction to allow better access.  The remaining purulent fluid was aspirated from the cavity.  Digital examination of the cavity was performed gently to lyse any loculations.  The cavity extended slightly anteriorly and slightly posteriorly along the deeper subcutaneous plane.  After ensuring hemostasis and adequate evacuation of that abscess, iodoform gauze was packed into these cavernous  areas, with a tail of that gauze left protruding from the wound.  The surrounding skin was cleansed and dried.  A sterile ABD pad and mesh panties were applied as the patient was taken down out of lithotomy position.  She tolerated the procedure well.  Counts reported as correct.  Blood loss estimated to be about 20 cc.  She was then allowed to arouse from the depths of her anesthesia and was transported to her room on the [facility] sidhu in stable condition.  Thank you.

## 2023-10-15 NOTE — ANESTHESIA POSTPROCEDURE EVALUATION
Patient: Any Selby    Procedure: Procedure(s):  Incision And Drainage Left Perirectal Abcess       Anesthesia Type:  MAC    Note:  Disposition: Admission   Postop Pain Control: Uneventful            Sign Out: Well controlled pain   PONV: No   Neuro/Psych: Uneventful            Sign Out: Acceptable/Baseline neuro status   Airway/Respiratory: Uneventful            Sign Out: Acceptable/Baseline resp. status   CV/Hemodynamics: Uneventful            Sign Out: Acceptable CV status; No obvious hypovolemia; No obvious fluid overload   Other NRE: NONE   DID A NON-ROUTINE EVENT OCCUR? No         Last vitals:  Vitals Value Taken Time   BP 92/59 10/14/23 1900   Temp     Pulse 87 10/14/23 1903   Resp     SpO2 97 % 10/14/23 1903   Vitals shown include unfiled device data.    Electronically Signed By: LESTER Drummond CRNA  October 14, 2023  7:05 PM

## 2023-10-15 NOTE — ANESTHESIA CARE TRANSFER NOTE
Patient: Any Selby    Procedure: Procedure(s):  Incision And Drainage Left Perirectal Abcess       Diagnosis: Ellen-rectal abscess [K61.1]  Diagnosis Additional Information: No value filed.    Anesthesia Type:   MAC     Note:    Oropharynx: oropharynx clear of all foreign objects  Level of Consciousness: drowsy  Oxygen Supplementation: nasal cannula  Level of Supplemental Oxygen (L/min / FiO2): 2  Independent Airway: airway patency satisfactory and stable  Dentition: dentition unchanged  Vital Signs Stable: post-procedure vital signs reviewed and stable  Report to RN Given: handoff report given  Patient transferred to: PACU    Handoff Report: Identifed the Patient, Identified the Reponsible Provider, Reviewed the pertinent medical history, Discussed the surgical course, Reviewed Intra-OP anesthesia mangement and issues during anesthesia, Set expectations for post-procedure period and Allowed opportunity for questions and acknowledgement of understanding  Vitals:  Vitals Value Taken Time   BP 97/61 10/14/23 1857   Temp     Pulse 83 10/14/23 1857   Resp     SpO2 94 % 10/14/23 1900   Vitals shown include unfiled device data.    Electronically Signed By: LESTER Drummond CRNA  October 14, 2023  7:01 PM

## 2023-10-16 DIAGNOSIS — Z98.890 STATUS POST INCISION AND DRAINAGE: Primary | ICD-10-CM

## 2023-10-16 LAB — HOLD SPECIMEN: NORMAL

## 2023-10-16 RX ORDER — CEPHALEXIN 500 MG/1
500 CAPSULE ORAL 3 TIMES DAILY
Qty: 30 CAPSULE | Refills: 0 | Status: SHIPPED | OUTPATIENT
Start: 2023-10-16 | End: 2024-04-17

## 2023-10-17 ENCOUNTER — OFFICE VISIT (OUTPATIENT)
Dept: SURGERY | Facility: OTHER | Age: 78
End: 2023-10-17
Attending: SURGERY
Payer: COMMERCIAL

## 2023-10-17 VITALS
HEART RATE: 65 BPM | TEMPERATURE: 97.6 F | SYSTOLIC BLOOD PRESSURE: 120 MMHG | RESPIRATION RATE: 18 BRPM | OXYGEN SATURATION: 97 % | DIASTOLIC BLOOD PRESSURE: 62 MMHG

## 2023-10-17 DIAGNOSIS — Z98.890 STATUS POST INCISION AND DRAINAGE: Primary | ICD-10-CM

## 2023-10-17 PROCEDURE — G0463 HOSPITAL OUTPT CLINIC VISIT: HCPCS | Performed by: SURGERY

## 2023-10-17 PROCEDURE — 99213 OFFICE O/P EST LOW 20 MIN: CPT | Performed by: SURGERY

## 2023-10-17 ASSESSMENT — PAIN SCALES - GENERAL: PAINLEVEL: NO PAIN (0)

## 2023-10-17 NOTE — PROGRESS NOTES
CLINIC NOTE - FOLLOW UP  10/17/2023    Patient:Any Selby    Reason for Visit: Follow up from recent surgery for incision and drainage of yanna-rectal abscess    This is a 77 year old female here for follow up from a recent surgery for a perirectal abscess. Her prior medical records were reviewed.       Approximately 3 weeks ago I did an incision and drainage of a perirectal abscess.  She was seen last week by Claudia Hernandez at which time he was doing well.  The patient states that it was doing well.  The patient states that after seeing Claudia that the pain started to come back.  She represented to the emergency room last Saturday and at that time underwent an additional incision and drainage.  She is here today for follow-up.  No complaints.    Current Medications:  Current Outpatient Medications   Medication Sig Dispense Refill    amoxicillin-clavulanate (AUGMENTIN) 400-57 MG/5ML suspension Take 10 mLs (800 mg) by mouth 2 times daily for 5 days 100 mL 0    aspirin 81 MG EC tablet Take 1 tablet (81 mg) by mouth daily 90 tablet 3    bisacodyl (DULCOLAX) 5 MG EC tablet Take 2 tablets by mouth daily as needed for constipation      carvedilol (COREG) 6.25 MG tablet Take 0.5 tablets (3.125 mg) by mouth 2 times daily (with meals) 90 tablet 3    cephALEXin (KEFLEX) 500 MG capsule Take 1 capsule (500 mg) by mouth 3 times daily 30 capsule 0    colchicine (COLCRYS) 0.6 MG tablet 2 tablets at onset of gout and may repeat in 1 hour to improve gout at onset and may take 1 tablet daily for 5 more days if gout persists 15 tablet 1    furosemide (LASIX) 20 MG tablet Take 1 tablet (20 mg) by mouth daily 90 tablet 3    HYDROcodone-acetaminophen (NORCO) 5-325 MG tablet Take 1 tablet by mouth every 4 hours as needed for pain 10 tablet 0    HYDROcodone-Acetaminophen  MG/15ML SOLN Take 5 mLs by mouth every 4 hours as needed for pain 120 mL 0    lidocaine (XYLOCAINE) 5 % external ointment Apply topically as needed  (hemorrhoids)      lisinopril (ZESTRIL) 10 MG tablet Take 0.5 tablets (5 mg) by mouth daily 90 tablet 3    nifedipine 0.2% in white petrolatum 0.2 % OINT ointment Place rectally as needed (hemorrhoids)      predniSONE (DELTASONE) 10 MG tablet 4 tabs orally for 4 days then 2 tabs orally for 4 days then 1 tablet orally for 4 days 28 tablet 1    senna (SENOKOT) 8.6 MG tablet Take 2 tablets by mouth every morning 180 tablet 3       Allergies:  No Known Allergies    PHYSICAL EXAM:     Vital signs: /62 (BP Location: Right arm, Patient Position: Sitting, Cuff Size: Adult Regular)   Pulse 65   Temp 97.6  F (36.4  C) (Tympanic)   Resp 18   SpO2 97%    Weight: [unfilled]   BMI: There is no height or weight on file to calculate BMI.   General: Normal, healthy, cooperative, in no acute distress, alert   On the left side of her perineum there is an obvious incision and drainage site.  Minimal surrounding erythema.  The wound is open.    ASSESSMENT:  77 year old female here as follow up from a recent surgery for recurrent perirectal abscess.    PLAN: We will continue with local wound care.  We will see how it goes.  If this returns then at that point I would consider placement of a seton or drain.    FOLLOW UP: 1 week

## 2023-10-18 LAB
BACTERIA ABSC ANAEROBE+AEROBE CULT: ABNORMAL
BACTERIA ABSC ANAEROBE+AEROBE CULT: ABNORMAL
PATH REPORT.COMMENTS IMP SPEC: NORMAL
PATH REPORT.FINAL DX SPEC: NORMAL
PATH REPORT.GROSS SPEC: NORMAL
PATH REPORT.MICROSCOPIC SPEC OTHER STN: NORMAL
PATH REPORT.RELEVANT HX SPEC: NORMAL
PHOTO IMAGE: NORMAL

## 2023-10-19 LAB — BACTERIA ABSC ANAEROBE+AEROBE CULT: NORMAL

## 2023-10-20 LAB
BACTERIA BLD CULT: NO GROWTH
BACTERIA BLD CULT: NO GROWTH

## 2023-10-25 ENCOUNTER — OFFICE VISIT (OUTPATIENT)
Dept: SURGERY | Facility: OTHER | Age: 78
End: 2023-10-25
Attending: NURSE PRACTITIONER
Payer: MEDICARE

## 2023-10-25 VITALS
OXYGEN SATURATION: 98 % | TEMPERATURE: 98.1 F | SYSTOLIC BLOOD PRESSURE: 112 MMHG | RESPIRATION RATE: 16 BRPM | HEART RATE: 95 BPM | DIASTOLIC BLOOD PRESSURE: 70 MMHG

## 2023-10-25 DIAGNOSIS — Z98.890 STATUS POST INCISION AND DRAINAGE: Primary | ICD-10-CM

## 2023-10-25 PROCEDURE — 99024 POSTOP FOLLOW-UP VISIT: CPT | Performed by: SURGERY

## 2023-10-25 PROCEDURE — G0463 HOSPITAL OUTPT CLINIC VISIT: HCPCS | Mod: 25

## 2023-10-25 ASSESSMENT — PAIN SCALES - GENERAL: PAINLEVEL: MODERATE PAIN (4)

## 2023-10-25 NOTE — PROGRESS NOTES
CLINIC NOTE - FOLLOW UP  10/25/2023      Patient:Any Selby    Reason for Visit: Follow up from recent surgery for incision and drainage of yanna-rectal abscess    This is a 77 year old female here for follow up from a recent surgery for a perirectal abscess. Her prior medical records were reviewed.       Approximately 4 weeks ago I did an incision and drainage of a perirectal abscess.  She did require opening of the abscess approximately 2 weeks ago. She is here today for follow-up.  No complaints.    Current Medications:  Current Outpatient Medications   Medication Sig Dispense Refill    aspirin 81 MG EC tablet Take 1 tablet (81 mg) by mouth daily 90 tablet 3    bisacodyl (DULCOLAX) 5 MG EC tablet Take 2 tablets by mouth daily as needed for constipation      carvedilol (COREG) 6.25 MG tablet Take 0.5 tablets (3.125 mg) by mouth 2 times daily (with meals) 90 tablet 3    cephALEXin (KEFLEX) 500 MG capsule Take 1 capsule (500 mg) by mouth 3 times daily 30 capsule 0    colchicine (COLCRYS) 0.6 MG tablet 2 tablets at onset of gout and may repeat in 1 hour to improve gout at onset and may take 1 tablet daily for 5 more days if gout persists 15 tablet 1    furosemide (LASIX) 20 MG tablet Take 1 tablet (20 mg) by mouth daily 90 tablet 3    HYDROcodone-Acetaminophen  MG/15ML SOLN Take 5 mLs by mouth every 4 hours as needed for pain 120 mL 0    lidocaine (XYLOCAINE) 5 % external ointment Apply topically as needed (hemorrhoids)      lisinopril (ZESTRIL) 10 MG tablet Take 0.5 tablets (5 mg) by mouth daily 90 tablet 3    nifedipine 0.2% in white petrolatum 0.2 % OINT ointment Place rectally as needed (hemorrhoids)      predniSONE (DELTASONE) 10 MG tablet 4 tabs orally for 4 days then 2 tabs orally for 4 days then 1 tablet orally for 4 days 28 tablet 1    senna (SENOKOT) 8.6 MG tablet Take 2 tablets by mouth every morning 180 tablet 3       Allergies:  No Known Allergies    PHYSICAL EXAM:     General: Normal,  healthy, cooperative, in no acute distress, alert   On the left side of her perineum there is an obvious incision and drainage site.  No surrounding erythema.  The wound is open.    ASSESSMENT:  77 year old female here as follow up from a recent surgery for recurrent perirectal abscess.    PLAN: We will continue with local wound care.  We will see how it goes.  If this returns then at that point I would consider placement of a seton or drain.    FOLLOW UP: 2 week

## 2023-11-07 ENCOUNTER — OFFICE VISIT (OUTPATIENT)
Dept: SURGERY | Facility: OTHER | Age: 78
End: 2023-11-07
Attending: SURGERY
Payer: MEDICARE

## 2023-11-07 VITALS
DIASTOLIC BLOOD PRESSURE: 62 MMHG | SYSTOLIC BLOOD PRESSURE: 102 MMHG | OXYGEN SATURATION: 98 % | WEIGHT: 173 LBS | BODY MASS INDEX: 28.82 KG/M2 | HEIGHT: 65 IN | TEMPERATURE: 97.6 F | HEART RATE: 95 BPM

## 2023-11-07 DIAGNOSIS — Z98.890 STATUS POST INCISION AND DRAINAGE: Primary | ICD-10-CM

## 2023-11-07 PROCEDURE — 99024 POSTOP FOLLOW-UP VISIT: CPT | Performed by: SURGERY

## 2023-11-07 PROCEDURE — G0463 HOSPITAL OUTPT CLINIC VISIT: HCPCS

## 2023-11-07 ASSESSMENT — PAIN SCALES - GENERAL: PAINLEVEL: NO PAIN (0)

## 2023-11-07 NOTE — PROGRESS NOTES
CLINIC NOTE - FOLLOW UP  11/7/2023      Patient:Any Selby    Reason for Visit: Follow up from recent surgery for incision and drainage of yanna-rectal abscess    This is a 77 year old female here for follow up from a recent surgery for a perirectal abscess. Her prior medical records were reviewed.       Approximately 6 weeks ago I did an incision and drainage of a perirectal abscess.  She did require opening of the abscess approximately 4 weeks ago. She is here today for follow-up.  No complaints.  She still is having some drainage.  Fever.  No chills.  No complains of pain.    Current Medications:  Current Outpatient Medications   Medication Sig Dispense Refill    aspirin 81 MG EC tablet Take 1 tablet (81 mg) by mouth daily 90 tablet 3    bisacodyl (DULCOLAX) 5 MG EC tablet Take 2 tablets by mouth daily as needed for constipation      carvedilol (COREG) 6.25 MG tablet Take 0.5 tablets (3.125 mg) by mouth 2 times daily (with meals) 90 tablet 3    cephALEXin (KEFLEX) 500 MG capsule Take 1 capsule (500 mg) by mouth 3 times daily 30 capsule 0    colchicine (COLCRYS) 0.6 MG tablet 2 tablets at onset of gout and may repeat in 1 hour to improve gout at onset and may take 1 tablet daily for 5 more days if gout persists 15 tablet 1    furosemide (LASIX) 20 MG tablet Take 1 tablet (20 mg) by mouth daily 90 tablet 3    HYDROcodone-Acetaminophen  MG/15ML SOLN Take 5 mLs by mouth every 4 hours as needed for pain 120 mL 0    lidocaine (XYLOCAINE) 5 % external ointment Apply topically as needed (hemorrhoids)      lisinopril (ZESTRIL) 10 MG tablet Take 0.5 tablets (5 mg) by mouth daily 90 tablet 3    nifedipine 0.2% in white petrolatum 0.2 % OINT ointment Place rectally as needed (hemorrhoids)      predniSONE (DELTASONE) 10 MG tablet 4 tabs orally for 4 days then 2 tabs orally for 4 days then 1 tablet orally for 4 days 28 tablet 1    senna (SENOKOT) 8.6 MG tablet Take 2 tablets by mouth every morning 180 tablet 3        Allergies:  No Known Allergies    PHYSICAL EXAM:     General: Normal, healthy, cooperative, in no acute distress, alert       ASSESSMENT:  77 year old female here as follow up from a recent surgery for recurrent perirectal abscess.    PLAN: We will continue with local wound care.  We will see how it goes.  If this returns then at that point I would consider placement of a seton or drain.    FOLLOW UP: PRN.  I did tell her that if it does not heal up in the next 2 to 3 weeks that she may be developing a fistula.  At that point we would need to take her to the operating room for a fistulotomy.

## 2024-02-28 ENCOUNTER — TELEPHONE (OUTPATIENT)
Dept: CARE COORDINATION | Facility: OTHER | Age: 79
End: 2024-02-28

## 2024-02-28 NOTE — TELEPHONE ENCOUNTER
Patient would like to take biotin, 5,000 mcg, and would like advice if this is ok to take with her medications.  Thank you.     Permission from patient to leave detailed message with provider response if no answer with call back.

## 2024-03-04 ENCOUNTER — OFFICE VISIT (OUTPATIENT)
Dept: SURGERY | Facility: OTHER | Age: 79
End: 2024-03-04
Attending: NURSE PRACTITIONER
Payer: COMMERCIAL

## 2024-03-04 ENCOUNTER — PREP FOR PROCEDURE (OUTPATIENT)
Dept: SURGERY | Facility: OTHER | Age: 79
End: 2024-03-04

## 2024-03-04 VITALS
SYSTOLIC BLOOD PRESSURE: 112 MMHG | DIASTOLIC BLOOD PRESSURE: 73 MMHG | RESPIRATION RATE: 16 BRPM | OXYGEN SATURATION: 96 % | HEART RATE: 83 BPM | TEMPERATURE: 97.7 F

## 2024-03-04 DIAGNOSIS — K60.30 ANAL FISTULA: Primary | ICD-10-CM

## 2024-03-04 PROCEDURE — G0463 HOSPITAL OUTPT CLINIC VISIT: HCPCS

## 2024-03-04 PROCEDURE — 99213 OFFICE O/P EST LOW 20 MIN: CPT | Performed by: NURSE PRACTITIONER

## 2024-03-04 ASSESSMENT — PAIN SCALES - GENERAL: PAINLEVEL: NO PAIN (0)

## 2024-03-04 NOTE — PROGRESS NOTES
CLINIC NOTE - FOLLOW UP  3/4/2024    Patient:Any Selby    Reason for Visit: Follow up from prior clinic visit for incision and drainage of yanna-rectal abscess    This is a 78 year old female here for follow up from a prior clinic visit for perirectal abscess.  She was last seen by Dr. Stu Francisco for this in November. Her prior medical records were reviewed.       She continues to know drainage and bleeding from the area and doesn't believe the area to be healed.     Current Medications:  Current Outpatient Medications   Medication Sig Dispense Refill    aspirin 81 MG EC tablet Take 1 tablet (81 mg) by mouth daily 90 tablet 3    carvedilol (COREG) 6.25 MG tablet Take 0.5 tablets (3.125 mg) by mouth 2 times daily (with meals) 90 tablet 3    furosemide (LASIX) 20 MG tablet Take 1 tablet (20 mg) by mouth daily 90 tablet 3    lisinopril (ZESTRIL) 10 MG tablet Take 0.5 tablets (5 mg) by mouth daily 90 tablet 3    senna (SENOKOT) 8.6 MG tablet Take 2 tablets by mouth every morning 180 tablet 3    bisacodyl (DULCOLAX) 5 MG EC tablet Take 2 tablets by mouth daily as needed for constipation (Patient not taking: Reported on 3/4/2024)      cephALEXin (KEFLEX) 500 MG capsule Take 1 capsule (500 mg) by mouth 3 times daily (Patient not taking: Reported on 3/4/2024) 30 capsule 0    colchicine (COLCRYS) 0.6 MG tablet 2 tablets at onset of gout and may repeat in 1 hour to improve gout at onset and may take 1 tablet daily for 5 more days if gout persists (Patient not taking: Reported on 3/4/2024) 15 tablet 1    HYDROcodone-Acetaminophen  MG/15ML SOLN Take 5 mLs by mouth every 4 hours as needed for pain (Patient not taking: Reported on 3/4/2024) 120 mL 0    lidocaine (XYLOCAINE) 5 % external ointment Apply topically as needed (hemorrhoids) (Patient not taking: Reported on 3/4/2024)      nifedipine 0.2% in white petrolatum 0.2 % OINT ointment Place rectally as needed (hemorrhoids) (Patient not taking: Reported on  3/4/2024)      predniSONE (DELTASONE) 10 MG tablet 4 tabs orally for 4 days then 2 tabs orally for 4 days then 1 tablet orally for 4 days (Patient not taking: Reported on 3/4/2024) 28 tablet 1       Allergies:  No Known Allergies    PHYSICAL EXAM:     Vital signs: /73 (BP Location: Right arm, Patient Position: Sitting, Cuff Size: Adult Regular)   Pulse 83   Temp 97.7  F (36.5  C) (Tympanic)   Resp 16   SpO2 96%    BMI: There is no height or weight on file to calculate BMI.   General: Normal, healthy, cooperative, in no acute distress, alert   Skin: no rashes   Lungs: respirations are non-labored   Abdominal: non-distended   Extremities: No cyanosis, clubbing or edema noted bilaterally in Upper and Lower Extremities   On the left of her perineum there is a open wound noted   Neurological: without deficit    ASSESSMENT:  78 year old female here as follow up from a prior clinic visit for incision and drainage of yanna-rectal abscess; possible rectal fistula    PLAN: Will schedule her in the OR for a rectal exam under anesthesia with possible fistulotomy.  She is to continue to keep the area clean and dry until her procedure.  She is to follow up sooner than scheduled with problems or concerns or worsening signs or symptoms.

## 2024-04-17 ENCOUNTER — LAB (OUTPATIENT)
Dept: LAB | Facility: OTHER | Age: 79
End: 2024-04-17
Attending: FAMILY MEDICINE
Payer: COMMERCIAL

## 2024-04-17 ENCOUNTER — OFFICE VISIT (OUTPATIENT)
Dept: FAMILY MEDICINE | Facility: OTHER | Age: 79
End: 2024-04-17
Attending: FAMILY MEDICINE
Payer: MEDICARE

## 2024-04-17 VITALS
RESPIRATION RATE: 18 BRPM | WEIGHT: 159.9 LBS | BODY MASS INDEX: 26.64 KG/M2 | SYSTOLIC BLOOD PRESSURE: 96 MMHG | OXYGEN SATURATION: 98 % | DIASTOLIC BLOOD PRESSURE: 48 MMHG | HEART RATE: 76 BPM | TEMPERATURE: 98.8 F | HEIGHT: 65 IN

## 2024-04-17 DIAGNOSIS — I50.22 CHRONIC SYSTOLIC CONGESTIVE HEART FAILURE (H): ICD-10-CM

## 2024-04-17 DIAGNOSIS — I70.0 ATHEROSCLEROSIS OF AORTA (H): Chronic | ICD-10-CM

## 2024-04-17 DIAGNOSIS — I70.0 ATHEROSCLEROSIS OF AORTA (H): ICD-10-CM

## 2024-04-17 DIAGNOSIS — D64.9 ANEMIA, UNSPECIFIED TYPE: ICD-10-CM

## 2024-04-17 DIAGNOSIS — N18.31 STAGE 3A CHRONIC KIDNEY DISEASE (H): Primary | ICD-10-CM

## 2024-04-17 DIAGNOSIS — N18.31 STAGE 3A CHRONIC KIDNEY DISEASE (H): ICD-10-CM

## 2024-04-17 LAB
ANION GAP SERPL CALCULATED.3IONS-SCNC: 11 MMOL/L (ref 7–15)
BASOPHILS # BLD AUTO: 0.1 10E3/UL (ref 0–0.2)
BASOPHILS NFR BLD AUTO: 1 %
BUN SERPL-MCNC: 34.2 MG/DL (ref 8–23)
CALCIUM SERPL-MCNC: 9.3 MG/DL (ref 8.8–10.2)
CHLORIDE SERPL-SCNC: 104 MMOL/L (ref 98–107)
CREAT SERPL-MCNC: 1.44 MG/DL (ref 0.51–0.95)
DEPRECATED HCO3 PLAS-SCNC: 28 MMOL/L (ref 22–29)
EGFRCR SERPLBLD CKD-EPI 2021: 38 ML/MIN/1.73M2
EOSINOPHIL # BLD AUTO: 0.3 10E3/UL (ref 0–0.7)
EOSINOPHIL NFR BLD AUTO: 3 %
ERYTHROCYTE [DISTWIDTH] IN BLOOD BY AUTOMATED COUNT: 12.2 % (ref 10–15)
GLUCOSE SERPL-MCNC: 149 MG/DL (ref 70–99)
HCT VFR BLD AUTO: 36.3 % (ref 35–47)
HGB BLD-MCNC: 12.2 G/DL (ref 11.7–15.7)
IMM GRANULOCYTES # BLD: 0 10E3/UL
IMM GRANULOCYTES NFR BLD: 0 %
LYMPHOCYTES # BLD AUTO: 2.7 10E3/UL (ref 0.8–5.3)
LYMPHOCYTES NFR BLD AUTO: 26 %
MCH RBC QN AUTO: 32.4 PG (ref 26.5–33)
MCHC RBC AUTO-ENTMCNC: 33.6 G/DL (ref 31.5–36.5)
MCV RBC AUTO: 96 FL (ref 78–100)
MONOCYTES # BLD AUTO: 0.6 10E3/UL (ref 0–1.3)
MONOCYTES NFR BLD AUTO: 6 %
NEUTROPHILS # BLD AUTO: 6.5 10E3/UL (ref 1.6–8.3)
NEUTROPHILS NFR BLD AUTO: 64 %
NRBC # BLD AUTO: 0 10E3/UL
NRBC BLD AUTO-RTO: 0 /100
PLATELET # BLD AUTO: 338 10E3/UL (ref 150–450)
POTASSIUM SERPL-SCNC: 4 MMOL/L (ref 3.4–5.3)
RBC # BLD AUTO: 3.77 10E6/UL (ref 3.8–5.2)
SODIUM SERPL-SCNC: 143 MMOL/L (ref 135–145)
WBC # BLD AUTO: 10.2 10E3/UL (ref 4–11)

## 2024-04-17 PROCEDURE — 85025 COMPLETE CBC W/AUTO DIFF WBC: CPT | Mod: ZL

## 2024-04-17 PROCEDURE — 80048 BASIC METABOLIC PNL TOTAL CA: CPT | Mod: ZL

## 2024-04-17 PROCEDURE — 36415 COLL VENOUS BLD VENIPUNCTURE: CPT | Mod: ZL

## 2024-04-17 PROCEDURE — G0463 HOSPITAL OUTPT CLINIC VISIT: HCPCS

## 2024-04-17 PROCEDURE — 99214 OFFICE O/P EST MOD 30 MIN: CPT | Performed by: FAMILY MEDICINE

## 2024-04-17 ASSESSMENT — PAIN SCALES - GENERAL: PAINLEVEL: NO PAIN (0)

## 2024-04-17 NOTE — LETTER
My Heart Failure Action Plan  Name: Any Selby   YOB: 1945  Date: 4/16/2024   My doctor:   [facility]   [id number]  [place] [address]  [phone] My Diagnosis: HF-pEF (EF > 40%)  My Ejection Fraction:   Lab Results   Component Value Date    LVEF 45-50% (mildly reduced) 10/03/2023     45% - 49%  My Exercise Goal: Start exercise slowly - to begin, do a few minutes of exercise, several times a day. Increase your time and speed [id number] to build tolerance, with a goal of 30 minutes of exercise daily. Steady, slow, and consistent exercise is both safe and healthy. Stop and rest when you feel tired or become short of breath. Do not push yourself on days when you don t feel well.       My Weight Plan:   Wt Readings from Last 2 Encounters:   11/07/23 78.5 kg (173 lb)   10/14/23 78.5 kg (173 lb)     Weigh yourself daily using the same scale. If you gain more than 2 pounds in 24 hours or 5 pounds in 7 days. call the clinic    My Diet Goal: No added salt    Emergency Room Visits:    Our goal is to improve your quality of life and help you avoid a visit to the emergency room or hospital.  If we work together, we can achieve this goal. But, if you feel you need to call 911 or go to the emergency room, please do so.  If you go to the emergency room, please bring your list of medicines and your daily weight chart with you.    Each day ask yourself:  Is my weight up?  Do I have swelling?  Do I have trouble breathing?  How did I sleep?  Other problems?       GREEN ZONE     Weight gained is no more than 2 pounds a day or 5 pounds a in 7 days.  No swelling in feet, ankles, legs or stomach.  No more swelling than usual.  No more trouble breathing than usual.  No change in my sleep.  No other problems. What should I do?  I am doing fine. I will take my medicine, follow my diet, see my doctor, exercise, and watch for symptoms.           YELLOW ZONE          Weight gain of more than 2 pounds in one day or 5 pounds in 7 days.  New swelling in ankle, leg, knee or thigh.  Bloating in belly, pants feel tighter.  Swelling in hands or face.  Coughing or trouble breathing while walking or talking.  Harder to breathe last night.  Have trouble sleeping, wake up short of breath.  Unusually tired.  Not eating.  Nausea, vomiting, or diarrhea  Pain in my chest or bad  leg cramps.  Feel weak or dizzy. What should I do?  I need to take action and call my doctor or nurse today.                 RED ZONE         Weight gain of 5 pounds overnight.  Chest pain or pressure that does not go away.  Feel less alert.  Wheezing or have trouble breathing when at rest.  Cannot sleep lying down.  Cannot take my medicines.  Pass out or faint. What should I do?  I need to call my doctor or nurse now!  Call 911 if I have chest pain or cannot breathe.

## 2024-04-17 NOTE — PROGRESS NOTES
"  Assessment & Plan       ICD-10-CM    1. Stage 3a chronic kidney disease (H)  N18.31 CBC with Platelets & Differential     Basic metabolic panel      2. Chronic systolic congestive heart failure (H)  I50.22 CBC with Platelets & Differential     Basic metabolic panel      3. Anemia, unspecified type  D64.9       4. Atherosclerosis of aorta (H24)  I70.0       Doing great   Health issues well compensated  WT loss - helping with exercise program she is on  Continue current medications and behavioral changes.   Discussed RSV shot  Follow-up in 4 months                 BMI  Estimated body mass index is 26.61 kg/m  as calculated from the following:    Height as of this encounter: 1.651 m (5' 5\").    Weight as of this encounter: 72.5 kg (159 lb 14.4 oz).             No follow-ups on file.    Ladi Agarwal is a 78 year old, presenting for the following health issues:  Heart Failure and Kidney Problem        4/17/2024     2:44 PM   Additional Questions   Roomed by Luanne ANDERSON   Accompanied by no one     Kidney Problem     Heart Failure Follow-up   Are you experiencing any shortness of breath? No  Are you experiencing any swelling in your legs or feet?  No  Are you using more pillows than usual? No  Do you cough at night?  No  Do you check your weight daily?  Yes  Have you had a weight change recently?  Weight decrease but intentional  Are you having any of the following side effects from your medications? (Select all that apply)  The patient does not report symptoms of dizziness, fatigue, cough, swelling, or slow heart beat.  Since your last visit, how many times have you gone to the cardiologist, urgent care, emergency room, or hospital because of your heart failure?   None  Last Echo:   Echo result w/o MOPS: Interpretation SummaryLeft ventricular function is decreased. The ejection fraction is 45-50%(mildly reduced).Moderate to severe left ventricular dilation is present.Global right ventricular function is " "normal.Severe left atrial enlargement is present.Trace to mild mitral insufficiency is present.Aortic valve is normal in structure and function.Trace tricuspid insufficiency is present.Grade I or early diastolic dysfunction.      Chronic Kidney Disease Follow-up    Do you take any over the counter pain medicine?: No    Doing great - exercising - lost wt intentionally  No CHF sx  Eating well  Keeping active   Adjusted to [place] from [place]  Spoiling her grand kids  No health issues except has some post op sequela in rectal area  Getting EUA by Dr Francisco soon    Review of Systems  Constitutional, HEENT, cardiovascular, pulmonary, gi and gu systems are negative, except as otherwise noted.      Objective    BP 96/48 (BP Location: Left arm, Patient Position: Sitting, Cuff Size: Adult Regular)   Pulse 76   Temp 98.8  F (37.1  C) (Tympanic)   Resp 18   Ht 1.651 m (5' 5\")   Wt 72.5 kg (159 lb 14.4 oz)   SpO2 98%   BMI 26.61 kg/m    Body mass index is 26.61 kg/m .  Physical Exam   GENERAL: alert and no distress  NECK: no adenopathy, no asymmetry, masses, or scars  RESP: lungs clear to auscultation - no rales, rhonchi or wheezes  CV: regular rate and rhythm, normal S1 S2, no S3 or S4, no murmur, click or rub, no peripheral edema  ABDOMEN: soft, nontender, no hepatosplenomegaly, no masses and bowel sounds normal  MS: no gross musculoskeletal defects noted, no edema  PSYCH: mentation appears normal, affect normal/bright    Results for orders placed or performed in visit on 04/17/24   Basic metabolic panel     Status: Abnormal   Result Value Ref Range    Sodium 143 135 - 145 mmol/L    Potassium 4.0 3.4 - 5.3 mmol/L    Chloride 104 98 - 107 mmol/L    Carbon Dioxide (CO2) 28 22 - 29 mmol/L    Anion Gap 11 7 - 15 mmol/L    Urea Nitrogen 34.2 (H) 8.0 - 23.0 mg/dL    Creatinine 1.44 (H) 0.51 - 0.95 mg/dL    GFR Estimate 38 (L) >60 mL/min/1.73m2    Calcium 9.3 8.8 - 10.2 mg/dL    Glucose 149 (H) 70 - 99 mg/dL   CBC with " platelets and differential     Status: Abnormal   Result Value Ref Range    WBC Count 10.2 4.0 - 11.0 10e3/uL    RBC Count 3.77 (L) 3.80 - 5.20 10e6/uL    Hemoglobin 12.2 11.7 - 15.7 g/dL    Hematocrit 36.3 35.0 - 47.0 %    MCV 96 78 - 100 fL    MCH 32.4 26.5 - 33.0 pg    MCHC 33.6 31.5 - 36.5 g/dL    RDW 12.2 10.0 - 15.0 %    Platelet Count 338 150 - 450 10e3/uL    % Neutrophils 64 %    % Lymphocytes 26 %    % Monocytes 6 %    % Eosinophils 3 %    % Basophils 1 %    % Immature Granulocytes 0 %    NRBCs per 100 WBC 0 <1 /100    Absolute Neutrophils 6.5 1.6 - 8.3 10e3/uL    Absolute Lymphocytes 2.7 0.8 - 5.3 10e3/uL    Absolute Monocytes 0.6 0.0 - 1.3 10e3/uL    Absolute Eosinophils 0.3 0.0 - 0.7 10e3/uL    Absolute Basophils 0.1 0.0 - 0.2 10e3/uL    Absolute Immature Granulocytes 0.0 <=0.4 10e3/uL    Absolute NRBCs 0.0 10e3/uL   CBC with Platelets & Differential     Status: Abnormal    Narrative    The following orders were created for panel order CBC with Platelets & Differential.  Procedure                               Abnormality         Status                     ---------                               -----------         ------                     CBC with platelets and d...[[id number]]  Abnormal            Final result                 Please view results for these tests on the individual orders.             Signed Electronically by: Nikita Hutchinson MD

## 2024-04-29 ENCOUNTER — ANESTHESIA EVENT (OUTPATIENT)
Dept: SURGERY | Facility: HOSPITAL | Age: 79
End: 2024-04-29
Payer: MEDICARE

## 2024-04-29 NOTE — ANESTHESIA PREPROCEDURE EVALUATION
Anesthesia Pre-Procedure Evaluation    Patient: Any Selby   MRN: [id number] : 1945        Procedure : Procedure(s):  EXAM UNDER ANESTHESIA, RECTUM and fistulotomy          Past Medical History:   Diagnosis Date     Refusal of blood transfusions as patient is [employer]       Past Surgical History:   Procedure Laterality Date     COLONOSCOPY       CV CORONARY ANGIOGRAM      NO stent needed     EYE MUSCLE SURGERY       INCISION AND DRAINAGE BUTTOCKS Left 10/14/2023    Procedure: Incision And Drainage Left Perirectal Abcess;  Surgeon: Brian Owusu MD;  Location: [facility]     TOTAL KNEE ARTHROPLASTY Right      TUBAL LIGATION        No Known Allergies   Social History     Tobacco Use     Smoking status: Never     Smokeless tobacco: Never   Substance Use Topics     Alcohol use: Not on file      Wt Readings from Last 1 Encounters:   24 72.5 kg (159 lb 14.4 oz)        Anesthesia Evaluation   Pt has had prior anesthetic. Type: General and MAC.    History of anesthetic complications  -  and PONV.  jehovah witness no blood products.    ROS/MED HX  ENT/Pulmonary:     (+) sleep apnea, mild, doesn't use CPAP,                                      Neurologic: Comment: Hx cognitive and behavioral changes       Cardiovascular:     (+) Dyslipidemia - -   -  - -      CHF  Last EF: 40                         Previous cardiac testing   Echo: Date: 10/23 Results:   Interpretation SummaryLeft ventricular function is decreased. The ejection fraction is 45-50%(mildly reduced).Moderate to severe left ventricular dilation is present.Global right ventricular function is normal.Severe left atrial enlargement is present.Trace to mild mitral insufficiency is present.Aortic valve is normal in structure and function.Trace tricuspid insufficiency is present.Grade I or early diastolic dysfunction.  Stress Test:  Date: Results:    ECG Reviewed:  Date: 10/23 Results:  SR with frequent PVCs  Cath:  Date:  "Results:      METS/Exercise Tolerance: >4 METS    Hematologic:     (+)      anemia,          Musculoskeletal:   (+)  arthritis,             GI/Hepatic: Comment: Previous ID Oct 2023      Renal/Genitourinary:     (+) renal disease, type: CRI,            Endo:     (+)               Obesity,       Psychiatric/Substance Use:  - neg psychiatric ROS     Infectious Disease: Comment: Perianal abscess      Malignancy:  - neg malignancy ROS     Other:  - neg other ROS    (+)  , H/O Chronic Pain,         Physical Exam    Airway        Mallampati: IV   TM distance: > 3 FB   Neck ROM: full   Mouth opening: < 3 cm    Respiratory Devices and Support         Dental       (+) Modest Abnormalities - crowns, retainers, 1 or 2 missing teeth      Cardiovascular   cardiovascular exam normal          Pulmonary   pulmonary exam normal            OUTSIDE LABS:  CBC:   Lab Results   Component Value Date    WBC 10.2 04/17/2024    WBC 15.5 (H) 10/15/2023    HGB 12.2 04/17/2024    HGB 9.3 (L) 10/15/2023    HCT 36.3 04/17/2024    HCT 28.1 (L) 10/15/2023     04/17/2024     10/15/2023     BMP:   Lab Results   Component Value Date     04/17/2024     10/14/2023    POTASSIUM 4.0 04/17/2024    POTASSIUM 4.1 10/14/2023    CHLORIDE 104 04/17/2024    CHLORIDE 102 10/14/2023    CO2 28 04/17/2024    CO2 22 10/14/2023    BUN 34.2 (H) 04/17/2024    BUN 19.7 10/14/2023    CR 1.44 (H) 04/17/2024    CR 1.26 (H) 10/14/2023     (H) 04/17/2024    GLC 78 10/15/2023     COAGS: No results found for: \"PTT\", \"INR\", \"FIBR\"  POC: No results found for: \"BGM\", \"HCG\", \"HCGS\"  HEPATIC:   Lab Results   Component Value Date    ALBUMIN 3.3 (L) 10/03/2023    PROTTOTAL 6.7 10/03/2023    ALT 5 10/03/2023    AST 11 10/03/2023    ALKPHOS 83 10/03/2023    BILITOTAL 0.4 10/03/2023     OTHER:   Lab Results   Component Value Date    LACT 1.5 10/14/2023    GEORGE 9.3 04/17/2024    MAG 2.0 10/02/2023    TSH 1.15 10/02/2023       Anesthesia Plan    ASA Status: " " 3    NPO Status:  NPO Appropriate    Anesthesia Type: MAC.     - Reason for MAC: straight local not clinically adequate   Induction: Intravenous, Propofol.   Maintenance: Balanced.        Consents    Anesthesia Plan(s) and associated risks, benefits, and realistic alternatives discussed. Questions answered and patient/representative(s) expressed understanding.     - Discussed: Risks, Benefits and Alternatives for BOTH SEDATION and the PROCEDURE were discussed     - Discussed with:  Patient      - Extended Intubation/Ventilatory Support Discussed: No.      - Patient is DNR/DNI Status: No     Use of blood products discussed: No .     Postoperative Care    Pain management: IV analgesics.   PONV prophylaxis: Ondansetron (or other 5HT-3), Dexamethasone or Solumedrol     Comments:    Other Comments: Risks and benefits of MAC anesthetic discussed including dental damage, aspiration, loss of airway, conversion to general anesthetic, CV complications, MI, stroke, death. Pt wishes to proceed. Had physical by Jasper 4/17/24 he did mention procedure           LESTER Alvarez CRNA    I have reviewed the pertinent notes and labs in the chart from the past 30 days and (re)examined the patient.  Any updates or changes from those notes are reflected in this note.              # Overweight: Estimated body mass index is 26.61 kg/m  as calculated from the following:    Height as of 4/17/24: 1.651 m (5' 5\").    Weight as of 4/17/24: 72.5 kg (159 lb 14.4 oz).      "

## 2024-05-06 ENCOUNTER — TELEPHONE (OUTPATIENT)
Dept: SURGERY | Facility: CLINIC | Age: 79
End: 2024-05-06

## 2024-05-06 ENCOUNTER — ANESTHESIA (OUTPATIENT)
Dept: SURGERY | Facility: HOSPITAL | Age: 79
End: 2024-05-06
Payer: MEDICARE

## 2024-05-06 ENCOUNTER — HOSPITAL ENCOUNTER (OUTPATIENT)
Facility: HOSPITAL | Age: 79
Discharge: HOME | End: 2024-05-06
Attending: SURGERY | Admitting: SURGERY
Payer: MEDICARE

## 2024-05-06 VITALS
HEART RATE: 71 BPM | BODY MASS INDEX: 25.55 KG/M2 | WEIGHT: 159 LBS | OXYGEN SATURATION: 95 % | DIASTOLIC BLOOD PRESSURE: 61 MMHG | SYSTOLIC BLOOD PRESSURE: 105 MMHG | RESPIRATION RATE: 14 BRPM | HEIGHT: 66 IN | TEMPERATURE: 97.5 F

## 2024-05-06 PROCEDURE — 360N000074 HC SURGERY LEVEL 1, PER MIN: Performed by: SURGERY

## 2024-05-06 PROCEDURE — 250N000025 HC SEVOFLURANE, PER MIN: Performed by: SURGERY

## 2024-05-06 PROCEDURE — 710N000010 HC RECOVERY PHASE 1, LEVEL 2, PER MIN: Performed by: SURGERY

## 2024-05-06 PROCEDURE — 999N000141 HC STATISTIC PRE-PROCEDURE NURSING ASSESSMENT: Performed by: SURGERY

## 2024-05-06 PROCEDURE — 250N000011 HC RX IP 250 OP 636: Performed by: NURSE ANESTHETIST, CERTIFIED REGISTERED

## 2024-05-06 PROCEDURE — 99223 1ST HOSP IP/OBS HIGH 75: CPT | Performed by: OBSTETRICS & GYNECOLOGY

## 2024-05-06 PROCEDURE — 99418 PROLNG IP/OBS E/M EA 15 MIN: CPT | Performed by: OBSTETRICS & GYNECOLOGY

## 2024-05-06 PROCEDURE — 710N000012 HC RECOVERY PHASE 2, PER MINUTE: Performed by: SURGERY

## 2024-05-06 PROCEDURE — 258N000003 HC RX IP 258 OP 636: Performed by: NURSE ANESTHETIST, CERTIFIED REGISTERED

## 2024-05-06 PROCEDURE — 45990 SURG DX EXAM ANORECTAL: CPT | Performed by: SURGERY

## 2024-05-06 PROCEDURE — 99100 ANES PT EXTEME AGE<1 YR&>70: CPT | Performed by: NURSE ANESTHETIST, CERTIFIED REGISTERED

## 2024-05-06 PROCEDURE — 250N000011 HC RX IP 250 OP 636: Mod: JZ | Performed by: SURGERY

## 2024-05-06 PROCEDURE — 250N000013 HC RX MED GY IP 250 OP 250 PS 637: Performed by: SURGERY

## 2024-05-06 PROCEDURE — 272N000001 HC OR GENERAL SUPPLY STERILE: Performed by: SURGERY

## 2024-05-06 PROCEDURE — 250N000009 HC RX 250: Performed by: NURSE ANESTHETIST, CERTIFIED REGISTERED

## 2024-05-06 PROCEDURE — 370N000017 HC ANESTHESIA TECHNICAL FEE, PER MIN: Performed by: SURGERY

## 2024-05-06 PROCEDURE — 45990 SURG DX EXAM ANORECTAL: CPT | Performed by: NURSE ANESTHETIST, CERTIFIED REGISTERED

## 2024-05-06 RX ORDER — METRONIDAZOLE 500 MG/100ML
500 INJECTION, SOLUTION INTRAVENOUS
Status: COMPLETED | OUTPATIENT
Start: 2024-05-06 | End: 2024-05-06

## 2024-05-06 RX ORDER — NALOXONE HYDROCHLORIDE 0.4 MG/ML
0.1 INJECTION, SOLUTION INTRAMUSCULAR; INTRAVENOUS; SUBCUTANEOUS
Status: DISCONTINUED | OUTPATIENT
Start: 2024-05-06 | End: 2024-05-06 | Stop reason: HOSPADM

## 2024-05-06 RX ORDER — PROPOFOL 10 MG/ML
INJECTION, EMULSION INTRAVENOUS
Status: COMPLETED | OUTPATIENT
Start: 2024-05-06 | End: 2024-05-06

## 2024-05-06 RX ORDER — OXYCODONE HYDROCHLORIDE 5 MG/1
5 TABLET ORAL
Status: DISCONTINUED | OUTPATIENT
Start: 2024-05-06 | End: 2024-05-06 | Stop reason: HOSPADM

## 2024-05-06 RX ORDER — LIDOCAINE 40 MG/G
CREAM TOPICAL
Status: DISCONTINUED | OUTPATIENT
Start: 2024-05-06 | End: 2024-05-06 | Stop reason: HOSPADM

## 2024-05-06 RX ORDER — ONDANSETRON 2 MG/ML
4 INJECTION INTRAMUSCULAR; INTRAVENOUS ONCE
Status: COMPLETED | OUTPATIENT
Start: 2024-05-06 | End: 2024-05-06

## 2024-05-06 RX ORDER — CEFAZOLIN SODIUM/WATER 2 G/20 ML
2 SYRINGE (ML) INTRAVENOUS
Status: COMPLETED | OUTPATIENT
Start: 2024-05-06 | End: 2024-05-06

## 2024-05-06 RX ORDER — EPHEDRINE SULFATE 50 MG/ML
INJECTION, SOLUTION INTRAMUSCULAR; INTRAVENOUS; SUBCUTANEOUS PRN
Status: DISCONTINUED | OUTPATIENT
Start: 2024-05-06 | End: 2024-05-06

## 2024-05-06 RX ORDER — SODIUM CHLORIDE, SODIUM LACTATE, POTASSIUM CHLORIDE, CALCIUM CHLORIDE 600; 310; 30; 20 MG/100ML; MG/100ML; MG/100ML; MG/100ML
INJECTION, SOLUTION INTRAVENOUS CONTINUOUS
Status: DISCONTINUED | OUTPATIENT
Start: 2024-05-06 | End: 2024-05-06 | Stop reason: HOSPADM

## 2024-05-06 RX ORDER — CEFAZOLIN SODIUM/WATER 2 G/20 ML
2 SYRINGE (ML) INTRAVENOUS SEE ADMIN INSTRUCTIONS
Status: DISCONTINUED | OUTPATIENT
Start: 2024-05-06 | End: 2024-05-06 | Stop reason: HOSPADM

## 2024-05-06 RX ORDER — LIDOCAINE HYDROCHLORIDE 20 MG/ML
INJECTION, SOLUTION INFILTRATION; PERINEURAL PRN
Status: DISCONTINUED | OUTPATIENT
Start: 2024-05-06 | End: 2024-05-06

## 2024-05-06 RX ORDER — FENTANYL CITRATE 50 UG/ML
INJECTION, SOLUTION INTRAMUSCULAR; INTRAVENOUS PRN
Status: DISCONTINUED | OUTPATIENT
Start: 2024-05-06 | End: 2024-05-06

## 2024-05-06 RX ORDER — ONDANSETRON 2 MG/ML
4 INJECTION INTRAMUSCULAR; INTRAVENOUS EVERY 30 MIN PRN
Status: DISCONTINUED | OUTPATIENT
Start: 2024-05-06 | End: 2024-05-06 | Stop reason: HOSPADM

## 2024-05-06 RX ORDER — ONDANSETRON 4 MG/1
4 TABLET, ORALLY DISINTEGRATING ORAL EVERY 30 MIN PRN
Status: DISCONTINUED | OUTPATIENT
Start: 2024-05-06 | End: 2024-05-06 | Stop reason: HOSPADM

## 2024-05-06 RX ADMIN — PHENYLEPHRINE HYDROCHLORIDE 200 MCG: 10 INJECTION INTRAVENOUS at 09:51

## 2024-05-06 RX ADMIN — PHENYLEPHRINE HYDROCHLORIDE 200 MCG: 10 INJECTION INTRAVENOUS at 09:59

## 2024-05-06 RX ADMIN — Medication 2 G: at 09:23

## 2024-05-06 RX ADMIN — PHENYLEPHRINE HYDROCHLORIDE 200 MCG: 10 INJECTION INTRAVENOUS at 10:13

## 2024-05-06 RX ADMIN — FENTANYL CITRATE 50 MCG: 50 INJECTION INTRAMUSCULAR; INTRAVENOUS at 09:36

## 2024-05-06 RX ADMIN — SODIUM PHOSPHATE, DIBASIC AND SODIUM PHOSPHATE, MONOBASIC 1 ENEMA: 7; 19 ENEMA RECTAL at 08:17

## 2024-05-06 RX ADMIN — Medication 10 MG: at 10:42

## 2024-05-06 RX ADMIN — Medication 10 MG: at 10:19

## 2024-05-06 RX ADMIN — METRONIDAZOLE 500 MG: 500 INJECTION, SOLUTION INTRAVENOUS at 09:06

## 2024-05-06 RX ADMIN — SODIUM CHLORIDE, POTASSIUM CHLORIDE, SODIUM LACTATE AND CALCIUM CHLORIDE: 600; 310; 30; 20 INJECTION, SOLUTION INTRAVENOUS at 09:13

## 2024-05-06 RX ADMIN — FENTANYL CITRATE 50 MCG: 50 INJECTION INTRAMUSCULAR; INTRAVENOUS at 09:41

## 2024-05-06 RX ADMIN — PROPOFOL 50 MG: 10 INJECTION, EMULSION INTRAVENOUS at 09:53

## 2024-05-06 RX ADMIN — ONDANSETRON 4 MG: 2 INJECTION INTRAMUSCULAR; INTRAVENOUS at 09:10

## 2024-05-06 RX ADMIN — LIDOCAINE HYDROCHLORIDE 40 MG: 20 INJECTION, SOLUTION INFILTRATION; PERINEURAL at 09:41

## 2024-05-06 ASSESSMENT — ACTIVITIES OF DAILY LIVING (ADL)
ADLS_ACUITY_SCORE: 24

## 2024-05-06 NOTE — OR NURSING
Patient and responsible adult given discharge instructions with no questions regarding instructions. Evelia score 20/20. Pain level 0/10.  Discharged from unit via wheelchair. Patient discharged to home with daughter, copy of AVS sent and educations information in anal fistula. No questions offered at time of discharge. .

## 2024-05-06 NOTE — ANESTHESIA CARE TRANSFER NOTE
Patient: Any Selby    Procedure: Procedure(s):  EXAM UNDER ANESTHESIA, RECTUM       Diagnosis: Anal fistula [K60.3]  Diagnosis Additional Information: No value filed.    Anesthesia Type:   MAC     Note:    Oropharynx: spontaneously breathing  Level of Consciousness: awake  Oxygen Supplementation: room air    Independent Airway: airway patency satisfactory and stable  Dentition: dentition unchanged  Vital Signs Stable: post-procedure vital signs reviewed and stable  Report to RN Given: handoff report given  Patient transferred to: PACU    Handoff Report: Identifed the Patient, Identified the Reponsible Provider, Reviewed the pertinent medical history, Discussed the surgical course, Reviewed Intra-OP anesthesia mangement and issues during anesthesia, Set expectations for post-procedure period and Allowed opportunity for questions and acknowledgement of understanding    Vitals:  Vitals Value Taken Time   BP     Temp     Pulse     Resp     SpO2 96 % 05/06/24 1100   Vitals shown include unfiled device data.    Electronically Signed By: LESTER Longoria CRNA  May 6, 2024  11:01 AM

## 2024-05-06 NOTE — TELEPHONE ENCOUNTER
Patient confirmed scheduled appointment:  Date: 06/13/2024  Time: 200 pm  Visit type: New patient  Provider:   Location: [facility]  Testing/imaging: n/a  Additional notes: Per In-basket message from Christine Goode RN

## 2024-05-06 NOTE — ANESTHESIA POSTPROCEDURE EVALUATION
Patient: Any Selby    Procedure: Procedure(s):  EXAM UNDER ANESTHESIA, RECTUM       Anesthesia Type:  MAC    Note:  Disposition: Outpatient   Postop Pain Control: Uneventful            Sign Out: Well controlled pain   PONV: No   Neuro/Psych: Uneventful            Sign Out: Acceptable/Baseline neuro status   Airway/Respiratory: Uneventful            Sign Out: Acceptable/Baseline resp. status   CV/Hemodynamics: Uneventful            Sign Out: Acceptable CV status; No obvious hypovolemia; No obvious fluid overload   Other NRE: NONE   DID A NON-ROUTINE EVENT OCCUR? No       Last vitals:  Vitals Value Taken Time   /54 05/06/24 1135   Temp 96.8  F (36  C) 05/06/24 1135   Pulse 52 05/06/24 1135   Resp 16 05/06/24 1135   SpO2 98 % 05/06/24 1135   Vitals shown include unfiled device data.    Electronically Signed By: LESTER Longoria CRNA  May 6, 2024  1:01 PM

## 2024-05-06 NOTE — TELEPHONE ENCOUNTER
Left Voicemail (1st Attempt) for the patient to call back and schedule the following:    Appointment type: New Patient  Provider: Dr. Zavala  Return date: offer 6/6 at 1130 am, or 6/13 at 230 pm  Specialty phone number: [phone]  Additional appointment(s) needed: n/a  Additonal Notes: per message from Christine SANTAMARIA RN    Left direct #

## 2024-05-06 NOTE — OR NURSING
Patient's daughter called; she had to leave but will be returning shortly; would like to speak with the surgeon about the procedure.

## 2024-05-06 NOTE — ANESTHESIA PROCEDURE NOTES
Airway       Patient location during procedure: OR       Procedure Start/Stop Times: 5/6/2024 9:53 AM and 5/6/2024 9:53 AM  Staff -        CRNA: Sarabjit Lau APRN CRNA       Performed By: CRNAIndications and Patient Condition       Induction type:intravenous       Mask difficulty assessment: 1 - vent by mask    Final Airway Details       Final airway type: supraglottic airway    Supraglottic Airway Details        Type: LMA       Brand: I-Gel       LMA size: 3    Post intubation assessment        Placement verified by: equal breath sounds and chest rise        Number of attempts at approach: 1       Number of other approaches attempted: 0       Ease of procedure: easy       Dentition: Intact and Unchanged    Medication(s) Administered   propofol (DIPRIVAN) injection 10 mg/mL vial - Intravenous   50 mg - 5/6/2024 9:53:00 AM  Medication Administration Time: 5/6/2024 9:53 AM

## 2024-05-06 NOTE — H&P
"HISTORY AND PHYSICAL   5/6/2024    Patient : Any Selby    Planned Procedures : rectal exam under anesthesia with possible fistulotomy     This is a 78 year old female with a need for a rectal exam under anesthesia with possible fistulotomy     The patient has a history of perirectal abscess in the past.     Past Medical History:  Past Medical History:   Diagnosis Date    Refusal of blood transfusions as patient is [employer]        Past Surgical History:  Past Surgical History:   Procedure Laterality Date    COLONOSCOPY      CV CORONARY ANGIOGRAM  2018    NO stent needed    EYE MUSCLE SURGERY      INCISION AND DRAINAGE BUTTOCKS Left 10/14/2023    Procedure: Incision And Drainage Left Perirectal Abcess;  Surgeon: Brian Owusu MD;  Location: [facility]    TOTAL KNEE ARTHROPLASTY Right     TUBAL LIGATION         Family History History:  Family History   Problem Relation Age of Onset    Breast Cancer Paternal Aunt        History of Tobacco Use:  History   Smoking Status    Never   Smokeless Tobacco    Never       Current Medications:  No current outpatient medications on file.       Allergies:  No Known Allergies    ROS:  Constitutional: negative  Eyes: negative  Ears, nose, mouth, throat, and face: negative  Respiratory: negative  Cardiovascular: positive for history of angiogram  Gastrointestinal: positive for possible perirectal fistula  Genitourinary:negative  Integument/breast: negative  Hematologic/lymphatic: negative  Musculoskeletal:negative  Neurological: negative  Behavioral/Psych: negative  Endocrine: negative  Allergic/Immunologic: negative    PHYSICAL EXAM:     Vital signs: Ht 1.676 m (5' 6\")   Wt 72.1 kg (159 lb)   BMI 25.66 kg/m     BMI: Body mass index is 25.66 kg/m .   General: Normal, healthy, cooperative, in no acute distress, alert   Skin: no rashes   Lungs: clear to auscultation   CV: Regular rate and rhythm    Abdominal: non-distended, soft, non-tender to " palpation   Extremities: No cyanosis, clubbing or edema noted bilaterally in Upper and Lower Extremities   Neurological: without deficit    Assessment:   78 year old female with need of a rectal exam under anesthesia with possible fistulotomy     Plan:   Will proceed with doing a rectal exam under anesthesia with possible fistulotomy

## 2024-05-06 NOTE — CONSULTS
[facility]  Intraoperative Consult Note  Intraoperative consult requested by Stu Francisco MD     Pre-operative diagnosis: Anal fistula [K60.3]   Post-operative diagnosis Vulvar abscess, Complex   Procedure: Procedure(s):  EXAM UNDER ANESTHESIA, RECTUM AND PELVIS   Surgeon:    FINDINGS: Willie Alcazar MD  OB/GYN Hospitalist  After entering the operating room with Dr. Francisco I found the patient had been positioned in high lithotomy, she had been prepped and draped and was under general anesthesia.  Dr. Francisco had punctured the left perineal/pelvic abscess through the skin of the perineum which had drained purulent fluid.  There was a fine probe in the abscess cavity and Dr. Francisco was concerned about the possibility of a perianal abscess but felt there was no communication between the abscess cavity and the rectum.  When he moved the probe anterior it could be felt very close to the distal left vaginal mucosa.  I did a digital vaginal exam finding what I believe was a cervix flush with the vaginal vault.  There was also a defect of some sort in the upper part of the left posterior vaginal sulcus.  Using the probe Dr. Francisco had placed I could not find a connection between the abscess cavity and vaginal sulcus defect and no other communication between the abscess cavity and the vaginal wall was appreciated.  I did a digital rectal exam and and after manipulating the probe I agree with Dr. Francisco that there is good deal of tissue between the abscess cavity and the rectum and there does not appear to be any communication between the two.  I did a speculum exam and saw what appears to be a cervix flush with the vaginal vault and there is a small endocervical or endometrial polyp protruding from the cervical os.  It did not appear to be involved in any way with the abscess cavity.  Dr. Francisco found a small skin defect near the rectum on the left side.  He probed it and, while doing a rectal exam could find  no obvious communication between that defect and the rectum or vagina.   Anesthesia: General    Estimated blood loss: Minimal   Blood transfusion: No transfusion was given during surgery   Drains: None   Specimens: None   Complications: None   Condition:    Time spent: Stable    1hr 30min for chart review, intraoperative exam/evaluation, and documentation.       Willie Alcazar MD  5/6/2024  11:16 AM

## 2024-05-06 NOTE — OP NOTE
REPORT OF OPERATION  DATE OF PROCEDURE: 5/6/2024    PATIENT: Any Selby    SURGERY PERFORMED: Exam under anesthesia    PREOPERATIVE DIAGNOSIS: Perianal fistula    POSTOPERATIVE DIAGNOSIS: Complex abscess on the left side of the perineum.  Possibly involving both the joint and the rectum.    SURGEON: Stu Francisco MD    ASSISTANTS:  Dr. Alcazar of OB/GYN, interoperative consultation    ANESTHESIA: General Endotracheal Anesthesia    COMPLICATIONS: None apparent    TRANSFUSIONS: None    TISSUE TO PATHOLOGY: None    FINDINGS: Complex abscess along the left side of the perineum.  Extending both anteriorly and posteriorly.  2 fistulas involving the skin.  These did not seem to connect.  No absolute tract from either fistula into the rectum or into the vagina.  Defect along the left posterior wall of the vagina.  Cervical polyp    INDICATIONS: This is a 78 year old female a history of a perianal abscess that has been drained multiple times.  Initially done at an outside facility.  Has a persistent draining sinus.  Will take the patient to the operating room for an exam under anesthesia    DESCRIPTIONS OF PROCEDURE IN DETAIL: After consent was obtained the patient was taken to the operative suite and buffy in the supine position.  The patient was identified and the correct patient was confirmed.  General Endotracheal Anesthesia was administered by anesthesia.  The patient was placed in the high lithotomy and all pressure points were checked.  The patient was sterilely prepped and draped in the usual fashion.  A time out was performed verifying the correct patient and the correct procedure.  The entire operative team was in agreement.  All necessary equipment and supplies were in the room.    On initial investigation there were 2 areas of possible fistula.  1 anteriorly and 1 posterior.  The anterior 1 was probed and entered into a relatively large cavity which extended both anteriorly and posteriorly.  There  was a large amount of tissue noted between the rectum and the cavity.  When probed towards the vagina there was minimal tissue noted.  The posterior wound was also probed which probed more towards the rectum.  No evidence of fistula was noted.  No communication into the more anterior cavity was also found.      Because of the possible involvement with the vagina Dr. Alcazar of OB/GYN was interoperatively consulted.  Please see his notes for his findings.      Again no absolute defect from the anterior or posterior fistula openings along the perineum into either the rectum or the vagina were noted.  Decided to halt the procedure.  All instrumentation was removed.    All needle,  instrument and sponge counts were correct x2.  The patient was awakened in the operating room and taken to the recovery room in stable condition tolerated procedure well.

## 2024-05-06 NOTE — DISCHARGE INSTRUCTIONS
After Anesthesia (Sleep Medicine)  What should I do after anesthesia?  You should rest and relax for the next 24 hours. Avoid risky or difficult (strenuous) activity. A responsible adult should stay with you overnight.  Don't drive or use any heavy equipment for 24 hours. Even if you feel normal, your reactions may be affected by the sleep medicine given to you.  Don't drink alcohol or make any important decisions for 24 hours.  Slowly get back to your regular diet, as you feel able.  How should I expect to feel?  It's normal to feel dizzy, light-headed, or faint for up to a full day after anesthesia or while taking pain medicine. If this happens:   Sit down for a few minutes before standing.  Have someone help you when you get up to walk or use the bathroom.  If you have nausea (feel sick to your stomach) or vomit (throw up):   Drink clear liquids (such as apple juice, ginger ale, broth, or 7UP) until you feel better.  If you feel sick to your stomach, or you keep vomiting for 24 hours, please call the doctor.  What else should I know?  You might have a dry mouth, sore throat, muscle aches, or trouble sleeping. These should go away after 24 hours.  Please contact your doctor if you have any other symptoms that concern you, such as fever, pain, bleeding, fluid drainage, swelling, or headache, or if it's been over 8 to 10 hours and you still aren't able to pee (urinate).  If you have a history of sleep apnea, it's very important to use your CPAP machine for the next 24 hours when you nap or sleep.   For informational purposes only. Not to replace the advice of your health care provider. Copyright   2023 [facility]. All rights reserved. Clinically reviewed by Rip Kilgore MD. [employer] [id number] - REV 09/23.

## 2024-05-07 ENCOUNTER — PREP FOR PROCEDURE (OUTPATIENT)
Dept: SURGERY | Facility: CLINIC | Age: 79
End: 2024-05-07

## 2024-05-07 DIAGNOSIS — N82.8 VAGINAL FISTULA: ICD-10-CM

## 2024-05-07 DIAGNOSIS — K60.30 PERIANAL FISTULA: Primary | ICD-10-CM

## 2024-05-07 NOTE — TELEPHONE ENCOUNTER
Diagnosis, Referred by & from: Anal Fistula   Appt date: 2024   NOTES STATUS DETAILS   OFFICE NOTE from referring provider N/A    OFFICE NOTE from other specialist Care Everywhere / Internal [facility]:  24 - PCC OV with Dr. Hutchinson  3/4/24 - GEN SURG OV with Claudia Hernandez NP  23 - GEN SURG OV with Dr. Francisco  10/10/23 - [facility] OV with Dr. Hutchinson    [facility]:  23 - OBGYN OV with Dr. Vera  23 - [facility] OV with Dr. Preston   DISCHARGE SUMMARY from hospital Care Everywhere / Internal [facility]:  10/14/23 - Admission with Dr. Owusu  10/2/23 - Admission with Dr. Francisco    [facility]:  23 - Admission with Dr. Lewis   DISCHARGE REPORT from the ER Care Everywhere [facility]:  20 - ED OV with Dr. Charles   OPERATIVE REPORT Care Everywhere / Internal [facility]:  24 - OP Note for EUA, RECTUM with Dr. Francisco  10/14/23 - OP Note for INCISION AND DRAINAGE LEFT PERIRECTAL ABSCESS with Dr. Owusu  10/2/23 - OP Note for I&D LEFT PERIRECTAL ABSCESS with Dr. Francisco    [facility]:  23 - OP Note for EUA GYNECOLOGIC with Dr. Rowell  23 - OP Note for EUA, GYNECOLOGIC with Dr. Guajardo   MEDICATION LIST Internal    LABS     BIOPSIES/PATHOLOGY RELATED TO DIAGNOSIS Internal [facility]:  10/14/23 - Perirectal (Case: [id number])   DIAGNOSTIC PROCEDURES N/A    IMAGING (DISC & REPORT)      CT Received / Internal [facility]:  10/14/23 - CT Abd/Pelvis  10/2/23 - CT Abd/Pelvis    [facility]:  23 - CT Abd/Pelvis  23 - CT Abd/Pelvis  20 - CT Abd/Pelvis     Records Requested  24    Facility  [facility]  E-mail: [email or web address]  Fax: [phone]  Phone: [phone]   Outcome * 24 7:36 AM E-mailed and faxed urgent request to [facility] for imaging disc to be Fed'Exd to the clinic. - Priya    * 24 7:14 AM Imaging disc received from [facility] and sent to [facility] to be uploaded into PACs. - Priya    Trackin

## 2024-05-08 ENCOUNTER — TELEPHONE (OUTPATIENT)
Dept: SURGERY | Facility: CLINIC | Age: 79
End: 2024-05-08

## 2024-05-08 NOTE — TELEPHONE ENCOUNTER
Patient is scheduled for a procedure with Dr. Priya Christopher    Spoke with: Any    Date of Procedure: Weds 6/12/2024    Location: [facility] [place] ([facility])     Informed patient they will need an adult  N/A - No Anesthesia    Pre op with Provider Dr. Javier Zavala     H&P: Not required d/t No Anesthesia    Procedure Packet: will Mail     Additional comments:   - this procedure does not involve anesthesia    Lisa Echeverria  Ellen-op Coordinator  [place]-Rectal Surgery  Direct Phone: [phone]

## 2024-05-09 ENCOUNTER — DOCUMENTATION ONLY (OUTPATIENT)
Dept: OTHER | Facility: CLINIC | Age: 79
End: 2024-05-09

## 2024-05-09 ENCOUNTER — TELEPHONE (OUTPATIENT)
Dept: SURGERY | Facility: CLINIC | Age: 79
End: 2024-05-09

## 2024-05-09 NOTE — CONFIDENTIAL NOTE
Received a message from Endoscopy Scheduling stating that patient had called them asking if her procedure scheduled with Dr. Christopher at the [facility] ([facility]) on Weds 6/12/2024 at 12:30 PM could move to Thurs 6/13/2024.    LVM for patient explaining that we cannot reschedule her procedure with Dr. Christopher from 6/12/2024 to 6/13/2024. There is no [facility] Endoscopy or OR time available on 6/13/2024. That is why we scheduled her procedure on 6/12/2024.     Provided call back number in-case patient would like to discuss.    Lisa Echeverria  Ellen-op Coordinator  [place]-Rectal Surgery  Direct Phone: [phone]

## 2024-05-09 NOTE — TELEPHONE ENCOUNTER
Received  msg from patient again asking if her anal US procedure with Dr. Christopher at the [facility] ([facility]) on Weds 6/12/2024 can be rescheduled to Thurs 6/13/2024.    Spoke with patient and her daughter/ via phone, again explained that patient's procedure with Dr. Christopher cannot be moved to Thurs 6/13/2024 because we cannot get time for the procedure at the [facility] ([facility]) on Thurs 6/13/2024. The necessary equipment is only available at that location.    Again explained to patient that even getting time for her procedure at the [facility] ([facility]) on 6/12/2024 at a time other than 7:00 AM was a challenge, but that at least we were able to secure that date, and at a time (11:30 AM check-in), that will allow time for patient to drive from [place] to [place], [place] the morning of the procedure.    Patient again expressed understanding.    Lisa Echeverria  Ellen-op Coordinator  [place]-Rectal Surgery  Direct Phone: [phone]        Spoke with Dana from [facility] regarding patient. Patient will be establishing with us at a later date. The assisted living place needs latest H&P and med list. They were faxed to [facility] per their  Request.

## 2024-05-16 ENCOUNTER — DOCUMENTATION ONLY (OUTPATIENT)
Dept: OTHER | Facility: CLINIC | Age: 79
End: 2024-05-16

## 2024-05-21 ENCOUNTER — OFFICE VISIT (OUTPATIENT)
Dept: SURGERY | Facility: OTHER | Age: 79
End: 2024-05-21
Attending: NURSE PRACTITIONER
Payer: MEDICARE

## 2024-05-21 VITALS
BODY MASS INDEX: 25.55 KG/M2 | OXYGEN SATURATION: 96 % | DIASTOLIC BLOOD PRESSURE: 62 MMHG | HEIGHT: 66 IN | RESPIRATION RATE: 18 BRPM | WEIGHT: 159 LBS | TEMPERATURE: 99.3 F | HEART RATE: 81 BPM | SYSTOLIC BLOOD PRESSURE: 112 MMHG

## 2024-05-21 DIAGNOSIS — K60.30 ANAL FISTULA: Primary | ICD-10-CM

## 2024-05-21 PROCEDURE — G0463 HOSPITAL OUTPT CLINIC VISIT: HCPCS

## 2024-05-21 PROCEDURE — 99212 OFFICE O/P EST SF 10 MIN: CPT | Performed by: NURSE PRACTITIONER

## 2024-05-21 ASSESSMENT — PAIN SCALES - GENERAL: PAINLEVEL: NO PAIN (0)

## 2024-05-21 NOTE — PROGRESS NOTES
"CLINIC NOTE - POST-OP SURGERY  5/21/2024    Patient:Any Selby    Procedure: Exam under anesthesia     This is a 78 year old female who is 2 weeks s/p Exam under anesthesia.  A perianal fistula was noted during the exam under anesthesia.  Patient is keeping the area clean.  She is scheduled to see a surgeon at the [facility] regarding the area 6/13/24. The patient has no complaints today.     Current Medications:  Current Outpatient Medications   Medication Sig Dispense Refill    aspirin 81 MG EC tablet Take 1 tablet (81 mg) by mouth daily 90 tablet 3    carvedilol (COREG) 6.25 MG tablet Take 0.5 tablets (3.125 mg) by mouth 2 times daily (with meals) 90 tablet 3    Cyanocobalamin (VITAMIN B-12 PO) Take by mouth daily Patient unsure of dosage      furosemide (LASIX) 20 MG tablet Take 1 tablet (20 mg) by mouth daily 90 tablet 3    lisinopril (ZESTRIL) 10 MG tablet Take 0.5 tablets (5 mg) by mouth daily 90 tablet 3    senna (SENOKOT) 8.6 MG tablet Take 2 tablets by mouth every morning 180 tablet 3       Allergies:  No Known Allergies    PHYSICAL EXAM:   Vital signs: /62 (BP Location: Left arm, Cuff Size: Adult Regular)   Pulse 81   Temp 99.3  F (37.4  C) (Tympanic)   Resp 18   Ht 1.664 m (5' 5.5\")   Wt 72.1 kg (159 lb)   SpO2 96%   BMI 26.06 kg/m     BMI: Body mass index is 26.06 kg/m .   General: Normal, healthy, cooperative, in no acute distress, alert   Lungs: respirations are non-labored   Abdominal: non-distended   Wounds:  Small open area noted to left buttock without redness or swelling and no pain to palpation of the area is noted.     ASSESSMENT:    78 year old female who is 2 weeks s/p Exam under anesthesia.     PLAN:   Keep the appointment at the [facility] as scheduled.      Follow-up with me with problems/concerns.        "

## 2024-05-22 NOTE — PROGRESS NOTES
Colon and Rectal Surgery Clinic Note    RE: Any Selby.  : 1945.  MITZI: 2024.    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     HPI: Any is a 78 year old female a history of a perianal abscess that has been drained multiple times, she has also had a vulvar abscess last summer 2023 that was drained x2    Endoanal Ultrasound 24 by Dr. Christopher       Examination under anesthesia, rectum by Dr Stu Francisco 24:  FINDINGS: Complex abscess along the left side of the perineum.  Extending both anteriorly and posteriorly.  2 fistulas involving the skin.  These did not seem to connect.  No absolute tract from either fistula into the rectum or into the vagina.  Defect along the left posterior wall of the vagina.  Cervical polyp    DESCRIPTIONS OF PROCEDURE IN DETAIL: After consent was obtained the patient was taken to the operative suite and buffy in the supine position.  The patient was identified and the correct patient was confirmed.  General Endotracheal Anesthesia was administered by anesthesia.  The patient was placed in the high lithotomy and all pressure points were checked.  The patient was sterilely prepped and draped in the usual fashion.  A time out was performed verifying the correct patient and the correct procedure.  The entire operative team was in agreement.  All necessary equipment and supplies were in the room.     On initial investigation there were 2 areas of possible fistula.  1 anteriorly and 1 posterior.  The anterior 1 was probed and entered into a relatively large cavity which extended both anteriorly and posteriorly.  There was a large amount of tissue noted between the rectum and the cavity.  When probed towards the vagina there was minimal tissue noted.  The posterior wound was also probed which probed more towards the rectum.  No evidence of fistula was noted.  No communication into the more anterior cavity was also found.      Because  of the possible involvement with the vagina Dr. Alcazar of OB/GYN was interoperatively consulted.  Please see his notes for his findings.      Again no absolute defect from the anterior or posterior fistula openings along the perineum into either the rectum or the vagina were noted.  Decided to halt the procedure.  All instrumentation was removed.  All needle,  instrument and sponge counts were correct x2.  The patient was awakened in the operating room and taken to the recovery room in stable condition tolerated procedure well.      Incision and drainage of recurrent left perirectal abscess 10/14/23   Findings: Large recurrent abscess cavity in the area seen on CT scan in the left perirectal location, estimated to be at least 200 cc in volume.  Aerobic and anaerobic cultures were taken.     The patient was taken to the operating room where she was placed in the supine lithotomy position under monitored anesthesia care.  After appropriate timeout, the buttocks area and midline cleft were prepped and draped in the usual sterile fashion.  0.25% bupivacaine with epinephrine was used to anesthetize the skin and subcutaneous tissues surrounding her previous, healing, I&D site.  An elliptical incision was made to excise that previous wound.  This specimen was sent to pathology for evaluation.  The dissection was carried into the deeper subcutaneous plane using electrocautery to ensure simultaneous hemostasis.  The abscess cavity was entered without difficulty.  Cultures were obtained from within the cavity.  The cavity extended anteriorly from the entry site so the skin was incised slightly in an anterior direction to allow better access.  The remaining purulent fluid was aspirated from the cavity.  Digital examination of the cavity was performed gently to lyse any loculations.  The cavity extended slightly anteriorly and slightly posteriorly along the deeper subcutaneous plane.  After ensuring hemostasis and adequate  evacuation of that abscess, iodoform gauze was packed into these cavernous areas, with a tail of that gauze left protruding from the wound.  The surrounding skin was cleansed and dried.  A sterile ABD pad and mesh panties were applied as the patient was taken down out of lithotomy position.  She tolerated the procedure well.  Counts reported as correct.  Blood loss estimated to be about 20 cc.  She was then allowed to arouse from the depths of her anesthesia and was transported to her room on the [facility] sidhu in stable condition.  Thank you.    CT A/P 10/14/23- Recurrent 7.4 cm left perianal abscess.      CT A/P 10/2/23   IMPRESSION:  Rim-enhancing fluid collection with a few foci of air in the  subcutaneous tissues of the left buttocks adjacent to the left aspect  of the anus measuring up to 6.8 cm, most compatible with a perianal  abscess. There is no discrete supralevator extension.    CT A/P 8/16/23  Left labial soft tissue heterogeneous collection measuring up to 7.5 cm, relatively similar in size compared to prior however with more heterogeneous density. Additional bilateral labial soft tissue nodularity, measuring again up to 7.5 cm in the right labia. Findings are incompletely characterized on noncontrast evaluation however may reflect phlegmonous change versus early fluid collection.     GYNECOLOGIC EXAM UNDER REGIONAL ANESTHESIA VULVAR ABSCESS INCISION AND DRAINAGE 8/17/23 (in [place])  Findings:  Large 7 x 5cm left labial/vulvar abscess draining about 100 cc purulent, foul smelling and blood tinged fluid. Stab incision made outside of hymenal ring in vulvar epidermis at 5 o'clock, incision extended to 2cm, 10mm round ADEBAYO drain placed and sutured with 2-0 nylon. Abscess completely decompressed     Procedure in Detail:  The patient was placed in dorsal lithotomy position. Prepped and draped in standard fashion.   After bimanual exam performed, findings as above.   A stab incision made with 11 blade  scalpel at most fluctuant site just outside hymenal ring at labia majus epidermis with immediate release of purulent, foul smelling fluid drained. Incision then extended to 2cm. Remainder of fluid drained with manual compression and exploration with jason clamps. Cavity measuring 4x3 cm when probed. After drainage of purulent fluid, serosanguinous fluid drained and some harpal blood ~ 50 cc.   Size 10mm ADEBAYO drain placed, tracking about 3cm inside cavity and sewed to epithelium with 2-0 nylon. Area hemostatic. 5 cc of lidocaine 1.5% with epi injected at incision site.          Medical history:  Past Medical History:   Diagnosis Date    Refusal of blood transfusions as patient is Quaker        Surgical history:  Past Surgical History:   Procedure Laterality Date    COLONOSCOPY      CV CORONARY ANGIOGRAM  2018    NO stent needed    EXAM UNDER ANESTHESIA RECTUM N/A 5/6/2024    Procedure: EXAM UNDER ANESTHESIA, RECTUM;  Surgeon: Stu Francisco MD;  Location: [facility] OR    EYE MUSCLE SURGERY      INCISION AND DRAINAGE BUTTOCKS Left 10/14/2023    Procedure: Incision And Drainage Left Perirectal Abcess;  Surgeon: Brian Owusu MD;  Location: [facility] OR    TOTAL KNEE ARTHROPLASTY Right     TUBAL LIGATION         Family history:  Family History   Problem Relation Age of Onset    Breast Cancer Paternal Aunt        Medications:  Current Outpatient Medications   Medication Sig Dispense Refill    aspirin 81 MG EC tablet Take 1 tablet (81 mg) by mouth daily 90 tablet 3    carvedilol (COREG) 6.25 MG tablet Take 0.5 tablets (3.125 mg) by mouth 2 times daily (with meals) 90 tablet 3    Cyanocobalamin (VITAMIN B-12 PO) Take by mouth daily Patient unsure of dosage      furosemide (LASIX) 20 MG tablet Take 1 tablet (20 mg) by mouth daily 90 tablet 3    lisinopril (ZESTRIL) 10 MG tablet Take 0.5 tablets (5 mg) by mouth daily 90 tablet 3    senna (SENOKOT) 8.6 MG tablet Take 2 tablets by mouth every morning 180 tablet 3  "      Allergies:  No Known Allergies    Social history:   Social History     Tobacco Use    Smoking status: Never    Smokeless tobacco: Never   Substance Use Topics    Alcohol use: Not on file     Marital status: .    Physical Examination:  /69 (BP Location: Left arm, Patient Position: Sitting, Cuff Size: Adult Regular)   Pulse 65   Ht 1.664 m (5' 5.5\")   Wt 73.7 kg (162 lb 8 oz)   SpO2 99%   BMI 26.63 kg/m    Perianal external examination:  Two obvious external openings, left anterior and left posterior external opening 3-4 cm from the anal verge.  Digital rectal examination: Was performed.   Sphincter tone: Good.  Palpable lesions: yes, likely two fistula tracts following the external openings  Other: None.  Bimanual examination: was not performed.    Anoscopy: Was performed.   Hemorrhoids: No significant internal hemorrhoids.  Lesions: large external skin tags.    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       PLAN  1. EUA with seton placement x2 to allow for adequate drainage.    45 minutes spent on the date of encounter performing chart review, history and exam, documentation.      Javier Zavala MD  Division of Colon and Rectal Surgery  [facility]  [phone]    "

## 2024-06-10 ENCOUNTER — TELEPHONE (OUTPATIENT)
Dept: SURGERY | Facility: CLINIC | Age: 79
End: 2024-06-10
Payer: COMMERCIAL

## 2024-06-10 NOTE — TELEPHONE ENCOUNTER
Spoke with patient, she asked if she may be able to have surgery the same day as her surgery consult with Dr. Javier Zavala on Thursday June 13, 2024, in order to save another trip to [facility] from [facility].    Explained to patient that unless it is an emergency surgery, it would not be the same day as her surgery consult. Non-emergency surgeries are scheduled in advance, and also require a Pre-op H&P ahead of surgery for anesthesia clearance.    Patient expressed understanding.    Lisa Echeverria  Ellen-op Coordinator  [place]-Rectal Surgery  Direct Phone: [phone]

## 2024-06-12 ENCOUNTER — TRANSFERRED RECORDS (OUTPATIENT)
Dept: HEALTH INFORMATION MANAGEMENT | Facility: CLINIC | Age: 79
End: 2024-06-12
Payer: COMMERCIAL

## 2024-06-12 DIAGNOSIS — R46.89 COGNITIVE AND BEHAVIORAL CHANGES: ICD-10-CM

## 2024-06-12 DIAGNOSIS — R41.89 COGNITIVE AND BEHAVIORAL CHANGES: ICD-10-CM

## 2024-06-13 ENCOUNTER — OFFICE VISIT (OUTPATIENT)
Dept: SURGERY | Facility: CLINIC | Age: 79
End: 2024-06-13
Payer: COMMERCIAL

## 2024-06-13 ENCOUNTER — PRE VISIT (OUTPATIENT)
Dept: SURGERY | Facility: CLINIC | Age: 79
End: 2024-06-13

## 2024-06-13 VITALS
BODY MASS INDEX: 26.12 KG/M2 | WEIGHT: 162.5 LBS | OXYGEN SATURATION: 99 % | SYSTOLIC BLOOD PRESSURE: 115 MMHG | DIASTOLIC BLOOD PRESSURE: 69 MMHG | HEART RATE: 65 BPM | HEIGHT: 66 IN

## 2024-06-13 DIAGNOSIS — K60.30 PERIANAL FISTULA: Primary | ICD-10-CM

## 2024-06-13 DIAGNOSIS — N82.8 VAGINAL FISTULA: ICD-10-CM

## 2024-06-13 PROCEDURE — 99204 OFFICE O/P NEW MOD 45 MIN: CPT | Performed by: SURGERY

## 2024-06-13 ASSESSMENT — PAIN SCALES - GENERAL: PAINLEVEL: MILD PAIN (2)

## 2024-06-13 NOTE — TELEPHONE ENCOUNTER
Coreg  Last Written Prescription Date: 9/26/23  Last Fill Quantity: 90 # of Refills: 3  Last Office Visit: 4/17/24    Lasix  Last Written Prescription Date: 9/26/23  Last Fill Quantity: 90 # of Refills: 3  Last Office Visit: 4/17/24

## 2024-06-13 NOTE — PATIENT INSTRUCTIONS
Follow up:  Call Lisa to schedule procedure [phone]      Please call with any questions or concerns regarding your clinic visit today.    It is a pleasure being involved in your health care.    Contacts post-consultation depending on your need:    Radiology Appointments [phone]    Schedule Clinic Appointments [phone] # 1   M-F 7:30 - 5 pm    [facility] [phone]    Clinic Fax Number [phone]    Surgery Scheduling [phone]    My Chart is available 24 hours a day and is a secure way to access your records and communicate with your care team.  I strongly recommend signing up if you haven't already done so, if you are comfortable with computers.  If you would like to inquire about this or are having problems with My Chart access, you may call [phone] or go online at [email or web address].  Please allow at least 24 hours for a response and extra time on weekends and Holidays.

## 2024-06-13 NOTE — NURSING NOTE
"Chief Complaint   Patient presents with    Consult     Perianal Fistula       Vitals:    06/13/24 1401   BP: 115/69   BP Location: Left arm   Patient Position: Sitting   Cuff Size: Adult Regular   Pulse: 65   SpO2: 99%   Weight: 162 lb 8 oz   Height: 5' 5.5\"       Body mass index is 26.63 kg/m .    Selena Spencer CMA    "

## 2024-06-14 ENCOUNTER — TELEPHONE (OUTPATIENT)
Dept: SURGERY | Facility: CLINIC | Age: 79
End: 2024-06-14
Payer: COMMERCIAL

## 2024-06-14 PROBLEM — K60.30 PERIANAL FISTULA: Status: ACTIVE | Noted: 2024-06-13

## 2024-06-14 PROBLEM — N82.8 VAGINAL FISTULA: Status: ACTIVE | Noted: 2024-06-13

## 2024-06-14 NOTE — TELEPHONE ENCOUNTER
[facility] Call Center    Phone Message    May a detailed message be left on voicemail: yes     Reason for Call: Other: Requesting a call back. Pt is confused as to why she has to be seen after her surgery for a post op. Pt requesting a call back wants to know if there is transportation offered from [place] to her appointment in [place] and back.      Action Taken: Other: clr    Travel Screening: Not Applicable     Date of Service:

## 2024-06-14 NOTE — TELEPHONE ENCOUNTER
Patient is scheduled for surgery with Dr. Javier Zavala     Spoke with: Any and her daughter Amanda    Date of Surgery: Tuesday June 18, 2024    Location[facility] OR    Informed patient they will need an adult  YES    Pre op with Provider Dr. Javier Zavala     Upcoming Related Appointments:     Monday June 17, 2024  7:30 AM  LAB with  LAB  [facility] [place] ([facility] [place] ) [phone]    [address]  [place] [address]     Monday June 17, 2024  7:45 AM  (Arrive by 7:30 AM)  Pre-Operative Physical with Nikita Hutchinson MD  [facility] [place] [phone]    [address]  [place] [address]       Tuesday July 02, 2024 12:30 PM  (Arrive by 12:15 PM)  Post Op with LESTER Peterson [facility] [place] [phone]    [address] [place] [id number]      Surgery packet: Mailed    Additional Information:   - confirmed with RNJENY King RN, that patient does not need to do a Bowel Prep     Lisa Echeverria  Ellen-op Coordinator  [facility]  Direct Phone: [phone]

## 2024-06-17 ENCOUNTER — OFFICE VISIT (OUTPATIENT)
Dept: FAMILY MEDICINE | Facility: OTHER | Age: 79
End: 2024-06-17
Attending: FAMILY MEDICINE
Payer: COMMERCIAL

## 2024-06-17 ENCOUNTER — ANESTHESIA EVENT (OUTPATIENT)
Dept: SURGERY | Facility: AMBULATORY SURGERY CENTER | Age: 79
End: 2024-06-17
Payer: COMMERCIAL

## 2024-06-17 ENCOUNTER — LAB (OUTPATIENT)
Dept: LAB | Facility: OTHER | Age: 79
End: 2024-06-17
Attending: FAMILY MEDICINE
Payer: MEDICARE

## 2024-06-17 VITALS
RESPIRATION RATE: 16 BRPM | OXYGEN SATURATION: 98 % | DIASTOLIC BLOOD PRESSURE: 68 MMHG | WEIGHT: 162.13 LBS | HEART RATE: 82 BPM | HEIGHT: 66 IN | SYSTOLIC BLOOD PRESSURE: 104 MMHG | TEMPERATURE: 97.2 F | BODY MASS INDEX: 26.06 KG/M2

## 2024-06-17 DIAGNOSIS — N82.8 VAGINAL FISTULA: ICD-10-CM

## 2024-06-17 DIAGNOSIS — G47.33 OBSTRUCTIVE SLEEP APNEA: ICD-10-CM

## 2024-06-17 DIAGNOSIS — G47.33 OBSTRUCTIVE SLEEP APNEA: Chronic | ICD-10-CM

## 2024-06-17 DIAGNOSIS — N18.31 STAGE 3A CHRONIC KIDNEY DISEASE (H): ICD-10-CM

## 2024-06-17 DIAGNOSIS — Z01.810 PRE-OPERATIVE CARDIOVASCULAR EXAMINATION: ICD-10-CM

## 2024-06-17 DIAGNOSIS — K60.30 PERIANAL FISTULA: ICD-10-CM

## 2024-06-17 DIAGNOSIS — I50.22 CHRONIC SYSTOLIC CONGESTIVE HEART FAILURE (H): ICD-10-CM

## 2024-06-17 DIAGNOSIS — I70.0 ATHEROSCLEROSIS OF AORTA (H): ICD-10-CM

## 2024-06-17 DIAGNOSIS — Z01.810 PRE-OPERATIVE CARDIOVASCULAR EXAMINATION: Primary | ICD-10-CM

## 2024-06-17 PROBLEM — R07.81 PLEURITIC CHEST PAIN: Status: RESOLVED | Noted: 2020-08-20 | Resolved: 2024-06-17

## 2024-06-17 PROBLEM — J18.9 RIGHT LOWER LOBE PNEUMONIA: Status: RESOLVED | Noted: 2020-08-20 | Resolved: 2024-06-17

## 2024-06-17 PROBLEM — J11.1 INFLUENZA: Status: RESOLVED | Noted: 2020-01-30 | Resolved: 2024-06-17

## 2024-06-17 LAB
ALBUMIN SERPL BCG-MCNC: 4 G/DL (ref 3.5–5.2)
ALP SERPL-CCNC: 105 U/L (ref 40–150)
ALT SERPL W P-5'-P-CCNC: 8 U/L (ref 0–50)
ANION GAP SERPL CALCULATED.3IONS-SCNC: 14 MMOL/L (ref 7–15)
AST SERPL W P-5'-P-CCNC: 19 U/L (ref 0–45)
ATRIAL RATE - MUSE: 68 BPM
BASOPHILS # BLD AUTO: 0.1 10E3/UL (ref 0–0.2)
BASOPHILS NFR BLD AUTO: 1 %
BILIRUB SERPL-MCNC: 0.7 MG/DL
BUN SERPL-MCNC: 36.8 MG/DL (ref 8–23)
CALCIUM SERPL-MCNC: 9.1 MG/DL (ref 8.8–10.2)
CHLORIDE SERPL-SCNC: 104 MMOL/L (ref 98–107)
CREAT SERPL-MCNC: 1.32 MG/DL (ref 0.51–0.95)
DEPRECATED HCO3 PLAS-SCNC: 20 MMOL/L (ref 22–29)
DIASTOLIC BLOOD PRESSURE - MUSE: NORMAL MMHG
EGFRCR SERPLBLD CKD-EPI 2021: 42 ML/MIN/1.73M2
EOSINOPHIL # BLD AUTO: 0.4 10E3/UL (ref 0–0.7)
EOSINOPHIL NFR BLD AUTO: 4 %
ERYTHROCYTE [DISTWIDTH] IN BLOOD BY AUTOMATED COUNT: 12.5 % (ref 10–15)
GLUCOSE SERPL-MCNC: 101 MG/DL (ref 70–99)
HCT VFR BLD AUTO: 34.7 % (ref 35–47)
HGB BLD-MCNC: 11.6 G/DL (ref 11.7–15.7)
IMM GRANULOCYTES # BLD: 0 10E3/UL
IMM GRANULOCYTES NFR BLD: 0 %
INTERPRETATION ECG - MUSE: NORMAL
LYMPHOCYTES # BLD AUTO: 3.2 10E3/UL (ref 0.8–5.3)
LYMPHOCYTES NFR BLD AUTO: 36 %
MCH RBC QN AUTO: 32.1 PG (ref 26.5–33)
MCHC RBC AUTO-ENTMCNC: 33.4 G/DL (ref 31.5–36.5)
MCV RBC AUTO: 96 FL (ref 78–100)
MONOCYTES # BLD AUTO: 0.7 10E3/UL (ref 0–1.3)
MONOCYTES NFR BLD AUTO: 8 %
NEUTROPHILS # BLD AUTO: 4.4 10E3/UL (ref 1.6–8.3)
NEUTROPHILS NFR BLD AUTO: 50 %
NRBC # BLD AUTO: 0 10E3/UL
NRBC BLD AUTO-RTO: 0 /100
P AXIS - MUSE: 50 DEGREES
PLATELET # BLD AUTO: 336 10E3/UL (ref 150–450)
POTASSIUM SERPL-SCNC: 4.4 MMOL/L (ref 3.4–5.3)
PR INTERVAL - MUSE: 160 MS
PROT SERPL-MCNC: 7.5 G/DL (ref 6.4–8.3)
QRS DURATION - MUSE: 106 MS
QT - MUSE: 374 MS
QTC - MUSE: 397 MS
R AXIS - MUSE: 40 DEGREES
RBC # BLD AUTO: 3.61 10E6/UL (ref 3.8–5.2)
SODIUM SERPL-SCNC: 138 MMOL/L (ref 135–145)
SYSTOLIC BLOOD PRESSURE - MUSE: NORMAL MMHG
T AXIS - MUSE: 38 DEGREES
VENTRICULAR RATE- MUSE: 68 BPM
WBC # BLD AUTO: 8.9 10E3/UL (ref 4–11)

## 2024-06-17 PROCEDURE — 99214 OFFICE O/P EST MOD 30 MIN: CPT | Performed by: FAMILY MEDICINE

## 2024-06-17 PROCEDURE — 85025 COMPLETE CBC W/AUTO DIFF WBC: CPT | Mod: ZL

## 2024-06-17 PROCEDURE — 36415 COLL VENOUS BLD VENIPUNCTURE: CPT | Mod: ZL

## 2024-06-17 PROCEDURE — 93010 ELECTROCARDIOGRAM REPORT: CPT | Performed by: INTERNAL MEDICINE

## 2024-06-17 PROCEDURE — 93005 ELECTROCARDIOGRAM TRACING: CPT | Performed by: FAMILY MEDICINE

## 2024-06-17 PROCEDURE — 82247 BILIRUBIN TOTAL: CPT | Mod: ZL

## 2024-06-17 PROCEDURE — G0463 HOSPITAL OUTPT CLINIC VISIT: HCPCS

## 2024-06-17 RX ORDER — CARVEDILOL 6.25 MG/1
TABLET ORAL
Qty: 28 TABLET | Refills: 11 | Status: SHIPPED | OUTPATIENT
Start: 2024-06-17

## 2024-06-17 RX ORDER — FUROSEMIDE 20 MG
20 TABLET ORAL DAILY
Qty: 28 TABLET | Refills: 11 | Status: SHIPPED | OUTPATIENT
Start: 2024-06-17

## 2024-06-17 ASSESSMENT — PAIN SCALES - GENERAL: PAINLEVEL: NO PAIN (1)

## 2024-06-17 NOTE — PROGRESS NOTES
Preoperative Evaluation  [facility] [place]  [address]  [place] [facility] [place] [address]  Phone: [phone]  Primary Provider: Nikita Hutchinson MD  Pre-op Performing Provider: Nikita Hutchinson MD  Jun 17, 2024 6/17/2024   Surgical Information   What procedure is being done? Perianal/perivulvar abscess  with fistula times 2 - having Seton placement times 2    Facility or Hospital where procedure/surgery will be performed: [facility]   Who is doing the procedure / surgery? Dr Zavala   Date of surgery / procedure: 06/18/2024   Time of surgery / procedure: 12:   Where do you plan to recover after surgery? at home with family     Fax number for surgical facility: Note does not need to be faxed, will be available electronically in Epic.    Assessment & Plan     The proposed surgical procedure is considered INTERMEDIATE risk.      ICD-10-CM    1. Pre-operative cardiovascular examination  Z01.810 CBC with Platelets & Differential     Comprehensive metabolic panel     EKG 12-lead complete w/read - Clinics     EKG 12-lead complete w/read - Clinics      2. Perianal fistula  K60.3 CBC with Platelets & Differential     Comprehensive metabolic panel     EKG 12-lead complete w/read - Clinics     EKG 12-lead complete w/read - Clinics      3. Vaginal fistula  N82.8 CBC with Platelets & Differential     Comprehensive metabolic panel     EKG 12-lead complete w/read - Clinics     EKG 12-lead complete w/read - Clinics      4. Chronic systolic congestive heart failure (H)  I50.22 CBC with Platelets & Differential     Comprehensive metabolic panel     EKG 12-lead complete w/read - Clinics     EKG 12-lead complete w/read - Clinics      5. Atherosclerosis of aorta (H24)  I70.0 CBC with Platelets & Differential     Comprehensive metabolic panel     EKG 12-lead complete w/read - Clinics     EKG 12-lead complete w/read - Clinics      6. Stage 3a chronic kidney disease (H)  N18.31 CBC with Platelets & Differential      Comprehensive metabolic panel     EKG 12-lead complete w/read - Clinics     EKG 12-lead complete w/read - Clinics      7. Obstructive sleep apnea  G47.33 CBC with Platelets & Differential     Comprehensive metabolic panel     EKG 12-lead complete w/read - Clinics     EKG 12-lead complete w/read - Clinics      8. Refusal of blood transfusions as patient is Synagogue  Z53.1 CBC with Platelets & Differential     Comprehensive metabolic panel     EKG 12-lead complete w/read - Clinics     EKG 12-lead complete w/read - Clinics                    - No identified additional risk factors other than previously addressed    Antiplatelet or Anticoagulation Medication Instructions      Additional Medication Instructions  Take all scheduled medications on the day of surgery EXCEPT for modifications listed below:  HOLD ASPIRIN TODAY AND TOMORROW-- TAKE REGULAR MEDS TODAY EXCEPT ASPIRIN    TOMORROW -- JUST ONLY TAKE THE COREG / CARVEDILOL   Recommendation  Approval given to proceed with proposed procedure, without further diagnostic evaluation.        Ladi Agarwal is a 78 year old, presenting for the following:  Pre-Op Exam          6/17/2024     7:53 AM   Additional Questions   Roomed by Aleah Harry   Accompanied by None         6/17/2024     7:53 AM   Patient Reported Additional Medications   Patient reports taking the following new medications None     HPI related to upcoming procedure:   77 yO female  presents for cardiopulmonary/general clearance to undergo the above procedure.  His surgeon listed above has asked for this clearance to undergo anesthesia. Pt has had this condition for approximately almost a year of recurrent abscesses/ fistula .   Overall pt is of good health and has not  had any perioperative complications with previous surgeries.      Pt has known CAD and CHF - very well compensated.  Pt is active for her age/ had no issues with previous several perineal surgeries this year        6/17/2024   Pre-Op Questionnaire   Have you ever had a heart attack or stroke? No   Have you ever had surgery on your heart or blood vessels, such as a stent placement, a coronary artery bypass, or surgery on an artery in your head, neck, heart, or legs? No   Do you have chest pain with activity? No   Do you have a history of heart failure? (!) YES patient reports history of heart failure previously   Do you currently have a cold, bronchitis or symptoms of other infection? No   Do you have a cough, shortness of breath, or wheezing? No   Do you or anyone in your family have previous history of blood clots? No   Do you or does anyone in your family have a serious bleeding problem such as prolonged bleeding following surgeries or cuts? No   Have you ever had problems with anemia or been told to take iron pills? No   Have you had any abnormal blood loss such as black, tarry or bloody stools, or abnormal vaginal bleeding? No   Have you ever had a blood transfusion? No   Are you willing to have a blood transfusion if it is medically needed before, during, or after your surgery? (!) NO patient reports not wanting a blood transfusion   Have you or any of your relatives ever had problems with anesthesia? No   Do you have sleep apnea, excessive snoring or daytime drowsiness? No   Do you have any artifical heart valves or other implanted medical devices like a pacemaker, defibrillator, or continuous glucose monitor? No   Do you have artificial joints? (!) YES   Are you allergic to latex? No     Health Care Directive  Patient has a Health Care Directive on file      Preoperative Review of    reviewed - no record of controlled substances prescribed.      Status of Chronic Conditions:  ANEMIA - Patient has a recent history of moderate-severe anemia, which has not been symptomatic. Work up to date has revealed more of anemia of chronic dz and recent several surgeries . Treatment has been monitoring .     CAD - Patient has a  longstanding history of moderate-severe CAD. Patient denies recent chest pain or NTG use, denies exercise induced dyspnea or PND.Echo  see Epic/ 10/2023, EKG today .     CHF - Patient has a longstanding history of moderate-severe CHF. Exacerbating conditions include ischemic heart disease. Currently the patient's condition is same. Current treatment regimen includes ACE inhibitor, Coreg, and diuretic. The patient denies chest pain, edema, orthopnea, SOB or recent weight gain. Last Echocardiogram 10/2023, EKG today .     Patient Active Problem List    Diagnosis Date Noted    Perianal fistula 06/13/2024     Priority: Medium    Vaginal fistula 06/13/2024     Priority: Medium    Perianal abscess 10/02/2023     Priority: Medium    Perirectal abscess 10/02/2023     Priority: Medium    Chronic systolic congestive heart failure (H) 09/26/2023     Priority: Medium    Cognitive and behavioral changes 09/26/2023     Priority: Medium    Vulvar abscess 09/26/2023     Priority: Medium    Stage 3a chronic kidney disease (H) 09/26/2023     Priority: Medium    Gout of multiple sites, unspecified cause, unspecified chronicity 09/26/2023     Priority: Medium    Mixed hyperlipidemia 09/26/2023     Priority: Medium    Primary osteoarthritis of both knees 09/26/2023     Priority: Medium    DDD (degenerative disc disease), lumbar 09/26/2023     Priority: Medium    Refusal of blood transfusions as patient is Yazdanism 09/26/2023     Priority: Medium    Osteoarthritis of right knee 01/27/2022     Priority: Medium     Formatting of this note might be different from the original. Added automatically from request for surgery [id number]      Osteopenia 10/31/2021     Priority: Medium    Anemia 08/23/2020     Priority: Medium    Acute nontraumatic kidney injury (H24) 01/30/2020     Priority: Medium    Hx of sepsis 01/30/2020     Priority: Medium     Formatting of this note might be different from the original. SEPTIC SHOCK [A41.9, R65.21]    HX OF SEPSIS [Z86.19]      Chronic kidney disease, stage 3a (H) 08/30/2019     Priority: Medium    Obstructive sleep apnea 01/24/2019     Priority: Medium     Formatting of this note might be different from the original. AHI 31.7, % < 90 % 0.0, ESS 1      Left heart failure (H) 01/04/2019     Priority: Medium    Atherosclerosis of aorta (H24) 12/07/2018     Priority: Medium    Arthritis 07/24/2014     Priority: Medium     Formatting of this note might be different from the original. Affecting hands, neck, shoulders and knees      Chronic pain 06/21/2012     Priority: Medium    Shoulder pain 06/14/2012     Priority: Medium     Formatting of this note might be different from the original. Bilateral. Chronic. Work related      Obesity 01/10/2011     Priority: Medium    Posterior calcaneal exostosis 01/10/2011     Priority: Medium      Past Medical History:   Diagnosis Date    Refusal of blood transfusions as patient is Rastafari      Past Surgical History:   Procedure Laterality Date    COLONOSCOPY      CV CORONARY ANGIOGRAM  2018    NO stent needed    EXAM UNDER ANESTHESIA RECTUM N/A 5/6/2024    Procedure: EXAM UNDER ANESTHESIA, RECTUM;  Surgeon: Stu Francisco MD;  Location: [facility]    EYE MUSCLE SURGERY      INCISION AND DRAINAGE BUTTOCKS Left 10/14/2023    Procedure: Incision And Drainage Left Perirectal Abcess;  Surgeon: Brian Owusu MD;  Location: [facility] OR    TOTAL KNEE ARTHROPLASTY Right     TUBAL LIGATION       Current Outpatient Medications   Medication Sig Dispense Refill    aspirin 81 MG EC tablet Take 1 tablet (81 mg) by mouth daily 90 tablet 3    carvedilol (COREG) 6.25 MG tablet TAKE 1/2 TABLET BY MOUTH TWICE DAILY WITH MEALS. 28 tablet 11    Cyanocobalamin (VITAMIN B-12 PO) Take by mouth daily Patient unsure of dosage      furosemide (LASIX) 20 MG tablet TAKE 1 TABLET BY MOUTH ONCE DAILY. 28 tablet 11    lisinopril (ZESTRIL) 10 MG tablet Take 0.5 tablets (5 mg) by mouth daily 90  "tablet 3    senna (SENOKOT) 8.6 MG tablet Take 2 tablets by mouth every morning 180 tablet 3       No Known Allergies     Social History     Tobacco Use    Smoking status: Never    Smokeless tobacco: Never   Substance Use Topics    Alcohol use: Not on file     Family History   Problem Relation Age of Onset    Breast Cancer Paternal Aunt      History   Drug Use Not on file             Review of Systems  Constitutional, neuro, ENT, endocrine, pulmonary, cardiac, gastrointestinal, genitourinary, musculoskeletal, integument and psychiatric systems are negative, except as otherwise noted.    Objective    /68 (BP Location: Right arm, Patient Position: Sitting, Cuff Size: Adult Regular)   Pulse 82   Temp 97.2  F (36.2  C) (Tympanic)   Resp 16   Ht 1.664 m (5' 5.5\")   Wt 73.5 kg (162 lb 2 oz)   SpO2 98%   BMI 26.57 kg/m     Estimated body mass index is 26.57 kg/m  as calculated from the following:    Height as of this encounter: 1.664 m (5' 5.5\").    Weight as of this encounter: 73.5 kg (162 lb 2 oz).  Physical Exam  GENERAL: alert and no distress  EYES: Eyes grossly normal to inspection, PERRL and conjunctivae and sclerae normal  HENT: ear canals and TM's normal, nose and mouth without ulcers or lesions  NECK: no adenopathy, no asymmetry, masses, or scars  RESP: lungs clear to auscultation - no rales, rhonchi or wheezes  CV: regular rate and rhythm, normal S1 S2, no S3 or S4, no murmur, click or rub, no peripheral edema  ABDOMEN: soft, nontender, no hepatosplenomegaly, no masses and bowel sounds normal  MS: no gross musculoskeletal defects noted, no edema  SKIN: no suspicious lesions or rashes  NEURO: Normal strength and tone, mentation intact and speech normal  PSYCH: mentation appears normal, affect normal/bright  LYMPH: no cervical, supraclavicular, axillary, or inguinal adenopathy    Recent Labs   Lab Test 06/17/24  0751 04/17/24  1429 10/14/23  1512 10/14/23  1429   HGB 11.6* 12.2   < >  --     338 "   < >  --    NA  --  143  --  137   POTASSIUM  --  4.0  --  4.1   CR  --  1.44*  --  1.26*    < > = values in this interval not displayed.        Diagnostics  Recent Results (from the past 24 hour(s))   Comprehensive metabolic panel    Collection Time: 06/17/24  7:51 AM   Result Value Ref Range    Sodium 138 135 - 145 mmol/L    Potassium 4.4 3.4 - 5.3 mmol/L    Carbon Dioxide (CO2) 20 (L) 22 - 29 mmol/L    Anion Gap 14 7 - 15 mmol/L    Urea Nitrogen 36.8 (H) 8.0 - 23.0 mg/dL    Creatinine 1.32 (H) 0.51 - 0.95 mg/dL    GFR Estimate 42 (L) >60 mL/min/1.73m2    Calcium 9.1 8.8 - 10.2 mg/dL    Chloride 104 98 - 107 mmol/L    Glucose 101 (H) 70 - 99 mg/dL    Alkaline Phosphatase 105 40 - 150 U/L    AST 19 0 - 45 U/L    ALT 8 0 - 50 U/L    Protein Total 7.5 6.4 - 8.3 g/dL    Albumin 4.0 3.5 - 5.2 g/dL    Bilirubin Total 0.7 <=1.2 mg/dL   CBC with platelets and differential    Collection Time: 06/17/24  7:51 AM   Result Value Ref Range    WBC Count 8.9 4.0 - 11.0 10e3/uL    RBC Count 3.61 (L) 3.80 - 5.20 10e6/uL    Hemoglobin 11.6 (L) 11.7 - 15.7 g/dL    Hematocrit 34.7 (L) 35.0 - 47.0 %    MCV 96 78 - 100 fL    MCH 32.1 26.5 - 33.0 pg    MCHC 33.4 31.5 - 36.5 g/dL    RDW 12.5 10.0 - 15.0 %    Platelet Count 336 150 - 450 10e3/uL    % Neutrophils 50 %    % Lymphocytes 36 %    % Monocytes 8 %    % Eosinophils 4 %    % Basophils 1 %    % Immature Granulocytes 0 %    NRBCs per 100 WBC 0 <1 /100    Absolute Neutrophils 4.4 1.6 - 8.3 10e3/uL    Absolute Lymphocytes 3.2 0.8 - 5.3 10e3/uL    Absolute Monocytes 0.7 0.0 - 1.3 10e3/uL    Absolute Eosinophils 0.4 0.0 - 0.7 10e3/uL    Absolute Basophils 0.1 0.0 - 0.2 10e3/uL    Absolute Immature Granulocytes 0.0 <=0.4 10e3/uL    Absolute NRBCs 0.0 10e3/uL   EKG 12-lead complete w/read - Clinics    Collection Time: 06/17/24  8:21 AM   Result Value Ref Range    Systolic Blood Pressure  mmHg    Diastolic Blood Pressure  mmHg    Ventricular Rate 68 BPM    Atrial Rate 68 BPM    CT Interval  160 ms    QRS Duration 106 ms     ms    QTc 397 ms    P Axis 50 degrees    R AXIS 40 degrees    T Axis 38 degrees    Interpretation ECG       Sinus rhythm with Premature atrial complexes  Nonspecific ST and T wave abnormality  Abnormal ECG  When compared with ECG of 03-OCT-2023 14:57,  Premature ventricular complexes are no longer Present  Premature atrial complexes are now Present  Nonspecific T wave abnormality now evident in Inferior leads  Nonspecific T wave abnormality has replaced inverted T waves in Lateral leads        EKG: appears normal, NSR, PAC, normal axis, normal intervals, no acute ST/T changes c/w ischemia, no LVH by voltage criteria, nonspecific ST-T changes, unchanged from previous tracings  Reading Physician Reading Date Result Priority   Raza Mayo MD  [phone] 10/3/2023      Narrative & Impression  [id number]  [id number]  [id number]  [id number]^YANNICK^SARAH^LUZ MARIA     [facility]  Echocardiography Laboratory  [address] [place] [address]     Name: GÓMEZ KESSLER  MRN: [id number]  : 1945  Study Date: 10/03/2023 10:38 AM  Age: 77 yrs  Gender: Female  Patient Location: [facility]  Reason For Study: Other, Please Specify in Comments  History: Elevated BNP  Ordering Physician: SARAH LANIER  Performed By: Julia Santiago RDCS     BSA: 1.8 m2  Height: 61 in  Weight: 179 lb  ______________________________________________________________________________  Procedure  Complete Portable Echo Adult.  ______________________________________________________________________________  Interpretation Summary  Left ventricular function is decreased. The ejection fraction is 45-50%  (mildly reduced).  Moderate to severe left ventricular dilation is present.  Global right ventricular function is normal.  Severe left atrial enlargement is present.  Trace to mild mitral insufficiency is present.  Aortic valve is normal in structure and  function.  Trace tricuspid insufficiency is present.  Grade I or early diastolic dysfunction.  ______________________________________________________________________________  Left Ventricle  Moderate to severe left ventricular dilation is present. Left ventricular  function is decreased. The ejection fraction is 45-50% (mildly reduced). Grade  I or early diastolic dysfunction.     Right Ventricle  Global right ventricular function is normal.     Atria  Severe left atrial enlargement is present.     Mitral Valve  Trace to mild mitral insufficiency is present.     Aortic Valve  Aortic valve is normal in structure and function. The aortic valve is  tricuspid. The mean AoV pressure gradient is 5.6 mmHg. The peak AoV pressure  gradient is 8.5 mmHg. No aortic stenosis is present.     Tricuspid Valve  Trace tricuspid insufficiency is present.     Pericardium  No pericardial effusion is present.  ______________________________________________________________________________  MMode/2D Measurements & Calculations  IVSd: 0.90 cm  LVIDd: 6.2 cm  LVIDs: 4.5 cm  LVPWd: 0.92 cm  FS: 26.7 %  LV mass(C)d: 230.9 grams  LV mass(C)dI: 128.1 grams/m2  Ao root diam: 3.2 cm  asc Aorta Diam: 3.5 cm  LVOT diam: 2.1 cm  LVOT area: 3.6 cm2  Ao root diam index Ht(cm/m): 2.1  Ao root diam index BSA (cm/m2): 1.8  Asc Ao diam index BSA (cm/m2): 2.0  Asc Ao diam index Ht(cm/m): 2.3     LA Volume Indexed (AL/bp): 59.8 ml/m2  RWT: 0.30  TAPSE: 2.6 cm     Doppler Measurements & Calculations  MV E max francisco: 107.0 cm/sec  MV A max francisco: 123.0 cm/sec  MV E/A: 0.87  MV dec slope: 550.0 cm/sec2  MV dec time: 0.19 sec  Ao V2 max: 146.0 cm/sec  Ao max P.5 mmHg  Ao V2 mean: 114.0 cm/sec  Ao mean P.6 mmHg  Ao V2 VTI: 36.0 cm  CRISTOPHER(I,D): 2.2 cm2  CRISTOPHER(V,D): 2.2 cm2  LV V1 max PG: 3.2 mmHg  LV V1 max: 90.0 cm/sec  LV V1 VTI: 22.3 cm  SV(LVOT): 80.9 ml  SI(LVOT): 44.9 ml/m2     AV Francisco Ratio (DI): 0.62  CRISTOPHER Index (cm2/m2): 1.2  E/E' avg: 15.8  Lateral E/e':  11.4  [facility] E/e': 20.3     Revised Cardiac Risk Index (RCRI)  The patient has the following serious cardiovascular risks for perioperative complications:   - Coronary Artery Disease (MI, positive stress test, angina, Qs on EKG) = 1 point     RCRI Interpretation: 1 point: Class II (low risk - 0.9% complication rate)         Signed Electronically by: Nikita Hutchinson MD  Copy of this evaluation report is provided to requesting physician.

## 2024-06-17 NOTE — PATIENT INSTRUCTIONS
HOLD ASPIRIN TODAY AND TOMORROW-- TAKE REGULAR MEDS TODAY EXCEPT ASPIRIN    TOMORROW -- JUST ONLY TAKE THE COREG / CARVEDILOL

## 2024-06-18 ENCOUNTER — ANESTHESIA (OUTPATIENT)
Dept: SURGERY | Facility: AMBULATORY SURGERY CENTER | Age: 79
End: 2024-06-18
Payer: COMMERCIAL

## 2024-06-18 ENCOUNTER — HOSPITAL ENCOUNTER (OUTPATIENT)
Facility: AMBULATORY SURGERY CENTER | Age: 79
Discharge: HOME | End: 2024-06-18
Attending: SURGERY
Payer: COMMERCIAL

## 2024-06-18 VITALS
HEIGHT: 65 IN | WEIGHT: 162 LBS | SYSTOLIC BLOOD PRESSURE: 122 MMHG | OXYGEN SATURATION: 99 % | HEART RATE: 67 BPM | RESPIRATION RATE: 20 BRPM | BODY MASS INDEX: 26.99 KG/M2 | DIASTOLIC BLOOD PRESSURE: 68 MMHG | TEMPERATURE: 97.1 F

## 2024-06-18 DIAGNOSIS — K60.30 PERIANAL FISTULA: Primary | ICD-10-CM

## 2024-06-18 PROCEDURE — 46020 PLACEMENT OF SETON: CPT | Mod: GC | Performed by: SURGERY

## 2024-06-18 PROCEDURE — 46020 PLACEMENT OF SETON: CPT

## 2024-06-18 PROCEDURE — 99100 ANES PT EXTEME AGE<1 YR&>70: CPT | Performed by: NURSE ANESTHETIST, CERTIFIED REGISTERED

## 2024-06-18 PROCEDURE — 46020 PLACEMENT OF SETON: CPT | Performed by: NURSE ANESTHETIST, CERTIFIED REGISTERED

## 2024-06-18 PROCEDURE — 99100 ANES PT EXTEME AGE<1 YR&>70: CPT | Performed by: ANESTHESIOLOGY

## 2024-06-18 PROCEDURE — 46020 PLACEMENT OF SETON: CPT | Performed by: ANESTHESIOLOGY

## 2024-06-18 RX ORDER — ONDANSETRON 2 MG/ML
4 INJECTION INTRAMUSCULAR; INTRAVENOUS EVERY 30 MIN PRN
Status: DISCONTINUED | OUTPATIENT
Start: 2024-06-18 | End: 2024-06-18 | Stop reason: HOSPADM

## 2024-06-18 RX ORDER — HYDROMORPHONE HYDROCHLORIDE 1 MG/ML
0.4 INJECTION, SOLUTION INTRAMUSCULAR; INTRAVENOUS; SUBCUTANEOUS EVERY 5 MIN PRN
Status: DISCONTINUED | OUTPATIENT
Start: 2024-06-18 | End: 2024-06-18 | Stop reason: HOSPADM

## 2024-06-18 RX ORDER — DEXAMETHASONE SODIUM PHOSPHATE 10 MG/ML
4 INJECTION, SOLUTION INTRAMUSCULAR; INTRAVENOUS
Status: DISCONTINUED | OUTPATIENT
Start: 2024-06-18 | End: 2024-06-19 | Stop reason: HOSPADM

## 2024-06-18 RX ORDER — SODIUM CHLORIDE, SODIUM LACTATE, POTASSIUM CHLORIDE, CALCIUM CHLORIDE 600; 310; 30; 20 MG/100ML; MG/100ML; MG/100ML; MG/100ML
INJECTION, SOLUTION INTRAVENOUS CONTINUOUS
Status: DISCONTINUED | OUTPATIENT
Start: 2024-06-18 | End: 2024-06-18 | Stop reason: HOSPADM

## 2024-06-18 RX ORDER — FENTANYL CITRATE 50 UG/ML
25 INJECTION, SOLUTION INTRAMUSCULAR; INTRAVENOUS EVERY 5 MIN PRN
Status: DISCONTINUED | OUTPATIENT
Start: 2024-06-18 | End: 2024-06-18 | Stop reason: HOSPADM

## 2024-06-18 RX ORDER — ONDANSETRON 4 MG/1
4 TABLET, ORALLY DISINTEGRATING ORAL
Status: DISCONTINUED | OUTPATIENT
Start: 2024-06-18 | End: 2024-06-19 | Stop reason: HOSPADM

## 2024-06-18 RX ORDER — ACETAMINOPHEN 325 MG/1
975 TABLET ORAL ONCE
Status: DISCONTINUED | OUTPATIENT
Start: 2024-06-18 | End: 2024-06-18 | Stop reason: HOSPADM

## 2024-06-18 RX ORDER — ACETAMINOPHEN 325 MG/1
325-650 TABLET ORAL EVERY 6 HOURS PRN
COMMUNITY

## 2024-06-18 RX ORDER — KETAMINE HYDROCHLORIDE 10 MG/ML
INJECTION INTRAMUSCULAR; INTRAVENOUS PRN
Status: DISCONTINUED | OUTPATIENT
Start: 2024-06-18 | End: 2024-06-18

## 2024-06-18 RX ORDER — PROPOFOL 10 MG/ML
INJECTION, EMULSION INTRAVENOUS PRN
Status: DISCONTINUED | OUTPATIENT
Start: 2024-06-18 | End: 2024-06-18

## 2024-06-18 RX ORDER — HYDROMORPHONE HYDROCHLORIDE 1 MG/ML
0.2 INJECTION, SOLUTION INTRAMUSCULAR; INTRAVENOUS; SUBCUTANEOUS EVERY 5 MIN PRN
Status: DISCONTINUED | OUTPATIENT
Start: 2024-06-18 | End: 2024-06-18 | Stop reason: HOSPADM

## 2024-06-18 RX ORDER — ONDANSETRON 2 MG/ML
4 INJECTION INTRAMUSCULAR; INTRAVENOUS ONCE
Status: COMPLETED | OUTPATIENT
Start: 2024-06-18 | End: 2024-06-18

## 2024-06-18 RX ORDER — NALOXONE HYDROCHLORIDE 0.4 MG/ML
0.1 INJECTION, SOLUTION INTRAMUSCULAR; INTRAVENOUS; SUBCUTANEOUS
Status: DISCONTINUED | OUTPATIENT
Start: 2024-06-18 | End: 2024-06-18 | Stop reason: HOSPADM

## 2024-06-18 RX ORDER — ONDANSETRON 4 MG/1
4 TABLET, ORALLY DISINTEGRATING ORAL EVERY 30 MIN PRN
Status: DISCONTINUED | OUTPATIENT
Start: 2024-06-18 | End: 2024-06-19 | Stop reason: HOSPADM

## 2024-06-18 RX ORDER — LIDOCAINE HYDROCHLORIDE 20 MG/ML
INJECTION, SOLUTION INFILTRATION; PERINEURAL PRN
Status: DISCONTINUED | OUTPATIENT
Start: 2024-06-18 | End: 2024-06-18

## 2024-06-18 RX ORDER — GINSENG 100 MG
CAPSULE ORAL PRN
Status: DISCONTINUED | OUTPATIENT
Start: 2024-06-18 | End: 2024-06-18 | Stop reason: HOSPADM

## 2024-06-18 RX ORDER — METRONIDAZOLE 500 MG/100ML
INJECTION, SOLUTION INTRAVENOUS PRN
Status: DISCONTINUED | OUTPATIENT
Start: 2024-06-18 | End: 2024-06-18

## 2024-06-18 RX ORDER — LIDOCAINE 40 MG/G
CREAM TOPICAL
Status: DISCONTINUED | OUTPATIENT
Start: 2024-06-18 | End: 2024-06-18 | Stop reason: HOSPADM

## 2024-06-18 RX ORDER — PROPOFOL 10 MG/ML
INJECTION, EMULSION INTRAVENOUS CONTINUOUS PRN
Status: DISCONTINUED | OUTPATIENT
Start: 2024-06-18 | End: 2024-06-18

## 2024-06-18 RX ORDER — DEXAMETHASONE SODIUM PHOSPHATE 10 MG/ML
4 INJECTION, SOLUTION INTRAMUSCULAR; INTRAVENOUS
Status: DISCONTINUED | OUTPATIENT
Start: 2024-06-18 | End: 2024-06-18 | Stop reason: HOSPADM

## 2024-06-18 RX ORDER — OXYCODONE HYDROCHLORIDE 5 MG/1
5 TABLET ORAL EVERY 8 HOURS PRN
Qty: 6 TABLET | Refills: 0 | Status: SHIPPED | OUTPATIENT
Start: 2024-06-18 | End: 2024-06-21

## 2024-06-18 RX ORDER — OXYCODONE HYDROCHLORIDE 5 MG/1
5 TABLET ORAL
Status: COMPLETED | OUTPATIENT
Start: 2024-06-18 | End: 2024-06-18

## 2024-06-18 RX ORDER — ACETAMINOPHEN 325 MG/1
975 TABLET ORAL ONCE
Status: COMPLETED | OUTPATIENT
Start: 2024-06-18 | End: 2024-06-18

## 2024-06-18 RX ORDER — FENTANYL CITRATE 50 UG/ML
50 INJECTION, SOLUTION INTRAMUSCULAR; INTRAVENOUS EVERY 5 MIN PRN
Status: DISCONTINUED | OUTPATIENT
Start: 2024-06-18 | End: 2024-06-18 | Stop reason: HOSPADM

## 2024-06-18 RX ORDER — ONDANSETRON 4 MG/1
4 TABLET, ORALLY DISINTEGRATING ORAL EVERY 30 MIN PRN
Status: DISCONTINUED | OUTPATIENT
Start: 2024-06-18 | End: 2024-06-18 | Stop reason: HOSPADM

## 2024-06-18 RX ORDER — ONDANSETRON 2 MG/ML
4 INJECTION INTRAMUSCULAR; INTRAVENOUS EVERY 30 MIN PRN
Status: DISCONTINUED | OUTPATIENT
Start: 2024-06-18 | End: 2024-06-19 | Stop reason: HOSPADM

## 2024-06-18 RX ORDER — NALOXONE HYDROCHLORIDE 0.4 MG/ML
0.1 INJECTION, SOLUTION INTRAMUSCULAR; INTRAVENOUS; SUBCUTANEOUS
Status: DISCONTINUED | OUTPATIENT
Start: 2024-06-18 | End: 2024-06-19 | Stop reason: HOSPADM

## 2024-06-18 RX ORDER — METRONIDAZOLE 500 MG/100ML
500 INJECTION, SOLUTION INTRAVENOUS
Status: DISCONTINUED | OUTPATIENT
Start: 2024-06-18 | End: 2024-06-18 | Stop reason: HOSPADM

## 2024-06-18 RX ADMIN — OXYCODONE HYDROCHLORIDE 5 MG: 5 TABLET ORAL at 15:48

## 2024-06-18 RX ADMIN — PROPOFOL 75 MCG/KG/MIN: 10 INJECTION, EMULSION INTRAVENOUS at 14:39

## 2024-06-18 RX ADMIN — SODIUM CHLORIDE, SODIUM LACTATE, POTASSIUM CHLORIDE, CALCIUM CHLORIDE: 600; 310; 30; 20 INJECTION, SOLUTION INTRAVENOUS at 13:08

## 2024-06-18 RX ADMIN — KETAMINE HYDROCHLORIDE 10 MG: 10 INJECTION INTRAMUSCULAR; INTRAVENOUS at 15:01

## 2024-06-18 RX ADMIN — PROPOFOL 30 MG: 10 INJECTION, EMULSION INTRAVENOUS at 14:39

## 2024-06-18 RX ADMIN — LIDOCAINE HYDROCHLORIDE 60 MG: 20 INJECTION, SOLUTION INFILTRATION; PERINEURAL at 14:39

## 2024-06-18 RX ADMIN — METRONIDAZOLE 500 MG: 500 INJECTION, SOLUTION INTRAVENOUS at 14:58

## 2024-06-18 RX ADMIN — ONDANSETRON 4 MG: 2 INJECTION INTRAMUSCULAR; INTRAVENOUS at 14:39

## 2024-06-18 RX ADMIN — KETAMINE HYDROCHLORIDE 10 MG: 10 INJECTION INTRAMUSCULAR; INTRAVENOUS at 14:39

## 2024-06-18 RX ADMIN — ACETAMINOPHEN 975 MG: 325 TABLET ORAL at 13:06

## 2024-06-18 NOTE — ANESTHESIA PREPROCEDURE EVALUATION
Anesthesia Pre-Procedure Evaluation    Patient: Any Selby   MRN: [id number] : 1945        Procedure : Procedure(s):  PLACEMENT, SETON STITCH, EXAMINATION UNDER ANESETHESIA          Past Medical History:   Diagnosis Date    Refusal of blood transfusions as patient is Muslim       Past Surgical History:   Procedure Laterality Date    COLONOSCOPY      CV CORONARY ANGIOGRAM      NO stent needed    EXAM UNDER ANESTHESIA RECTUM N/A 2024    Procedure: EXAM UNDER ANESTHESIA, RECTUM;  Surgeon: Stu Francisco MD;  Location: [facility]    EYE MUSCLE SURGERY      INCISION AND DRAINAGE BUTTOCKS Left 10/14/2023    Procedure: Incision And Drainage Left Perirectal Abcess;  Surgeon: Brian Owusu MD;  Location: [facility]    TOTAL KNEE ARTHROPLASTY Right     TUBAL LIGATION        No Known Allergies   Social History     Tobacco Use    Smoking status: Never    Smokeless tobacco: Never   Substance Use Topics    Alcohol use: Not on file      Wt Readings from Last 1 Encounters:   24 73.5 kg (162 lb 2 oz)        Anesthesia Evaluation   Pt has had prior anesthetic. Type: General and MAC.    History of anesthetic complications  -  and PONV.  jehovah witness no blood products.    ROS/MED HX  ENT/Pulmonary:     (+) sleep apnea, mild, doesn't use CPAP,                                      Neurologic: Comment: Hx cognitive and behavioral changes       Cardiovascular:     (+) Dyslipidemia - -   -  - -      CHF  Last EF: 40                         Previous cardiac testing   Echo: Date: 10/23 Results:   Interpretation SummaryLeft ventricular function is decreased. The ejection fraction is 45-50%(mildly reduced).Moderate to severe left ventricular dilation is present.Global right ventricular function is normal.Severe left atrial enlargement is present.Trace to mild mitral insufficiency is present.Aortic valve is normal in structure and function.Trace tricuspid insufficiency is present.Grade I or  "early diastolic dysfunction.  Stress Test:  Date: Results:    ECG Reviewed:  Date: 10/23 Results:  SR with frequent PVCs  Cath:  Date: Results:      METS/Exercise Tolerance: >4 METS    Hematologic:     (+)      anemia,          Musculoskeletal:   (+)  arthritis,             GI/Hepatic: Comment: Previous ID Oct 2023      Renal/Genitourinary:     (+) renal disease, type: CRI,            Endo:     (+)               Obesity,       Psychiatric/Substance Use:  - neg psychiatric ROS     Infectious Disease: Comment: Perianal abscess      Malignancy:  - neg malignancy ROS     Other:  - neg other ROS    (+)  , H/O Chronic Pain,         Physical Exam    Airway        Mallampati: II     Mouth opening: < 3 cm    Respiratory Devices and Support         Dental       (+) Minor Abnormalities - some fillings, tiny chips      Cardiovascular   cardiovascular exam normal          Pulmonary   pulmonary exam normal                OUTSIDE LABS:  CBC:   Lab Results   Component Value Date    WBC 8.9 06/17/2024    WBC 10.2 04/17/2024    HGB 11.6 (L) 06/17/2024    HGB 12.2 04/17/2024    HCT 34.7 (L) 06/17/2024    HCT 36.3 04/17/2024     06/17/2024     04/17/2024     BMP:   Lab Results   Component Value Date     06/17/2024     04/17/2024    POTASSIUM 4.4 06/17/2024    POTASSIUM 4.0 04/17/2024    CHLORIDE 104 06/17/2024    CHLORIDE 104 04/17/2024    CO2 20 (L) 06/17/2024    CO2 28 04/17/2024    BUN 36.8 (H) 06/17/2024    BUN 34.2 (H) 04/17/2024    CR 1.32 (H) 06/17/2024    CR 1.44 (H) 04/17/2024     (H) 06/17/2024     (H) 04/17/2024     COAGS: No results found for: \"PTT\", \"INR\", \"FIBR\"  POC: No results found for: \"BGM\", \"HCG\", \"HCGS\"  HEPATIC:   Lab Results   Component Value Date    ALBUMIN 4.0 06/17/2024    PROTTOTAL 7.5 06/17/2024    ALT 8 06/17/2024    AST 19 06/17/2024    ALKPHOS 105 06/17/2024    BILITOTAL 0.7 06/17/2024     OTHER:   Lab Results   Component Value Date    LACT 1.5 10/14/2023    GEORGE 9.1 " "06/17/2024    MAG 2.0 10/02/2023    TSH 1.15 10/02/2023       Anesthesia Plan    ASA Status:  3    NPO Status:  NPO Appropriate    Anesthesia Type: MAC.     - Reason for MAC: straight local not clinically adequate   Induction: Intravenous.   Maintenance: TIVA.        Consents    Anesthesia Plan(s) and associated risks, benefits, and realistic alternatives discussed. Questions answered and patient/representative(s) expressed understanding.     - Discussed: Risks, Benefits and Alternatives for BOTH SEDATION and the PROCEDURE were discussed     - Discussed with:  Patient            Postoperative Care    Pain management: IV analgesics, Oral pain medications, Multi-modal analgesia.   PONV prophylaxis: Ondansetron (or other 5HT-3), Dexamethasone or Solumedrol, Background Propofol Infusion     Comments:               David Rush MD    I have reviewed the pertinent notes and labs in the chart from the past 30 days and (re)examined the patient.  Any updates or changes from those notes are reflected in this note.              # Overweight: Estimated body mass index is 26.57 kg/m  as calculated from the following:    Height as of 6/17/24: 1.664 m (5' 5.5\").    Weight as of 6/17/24: 73.5 kg (162 lb 2 oz).      "

## 2024-06-18 NOTE — OP NOTE
"[facility] Colorectal Surgery Operative Report  June 18, 2024      PREOPERATIVE DIAGNOSIS:  1. History of anorectal infection.  2. Suspicion for anorectal fistula.    POSTOPERATIVE DIAGNOSIS:   1. History of anorectal infection.  2. Transsphincteric fistula x2    PROCEDURE:  1. Evaluation under anesthesia.  2. Placement of non-cutting seton.     ANESTHESIA: MAC plus local anesthesia.    SURGEON:  Javier Zavala M.D.    ASSISTANT(S): Jennifer Bell, PGY1.    INDICATIONS FOR PROCEDURE  Any Selby is a 78 year old female who presented with clinical suspicion for anorectal fistulas x2. She has had multiple anal operations and debridements related to recurrent abscesses. I thoroughly discussed the risks, benefits, and alternatives of operative treatment with the patient and she agreed to proceed.    General risks related to anorectal surgery were reviewed with the patient. These include, but are not limited to urinary retention, abscess, infection, bleeding, chronic pain, anal stenosis, fistula, fissure, and fecal incontinence.     OPERATIVE PROCEDURE: After obtaining informed consent, the patient was brought to the operating room and placed in the prone jackknife position. Appropriate preoperative mechanical deep venous thrombosis prophylaxis was administered. MAC anesthesia was gently induced. Bilateral lower extremity pneumatic compression devices were applied and all pressure points were cushioned. The perianal area was then preped and draped in the standard sterile fashion. After a \"time-out\" was performed, a total of 50 mL of 1% lidocaine with epinephrine mixed with Bupivacaine 0.25% without epinephrine was injected as a pudendal block. Digital rectal exam and anoscopy were performed. Findings: significant anal scarring in the distal anal canal, as well as two left lateral transsphincteric fistula. One was left posterior and one was left anterior leading to a cavity towards the left labia, but no communication to " the vagina was found. Yellow vessel loop non-cutting setons x2 were gently placed and secured with 5 individual 0 silk sutures. The distal rectum and anal canal were irrigated. Hemostasis was achieved. Instrument, sponge, and needle counts were all correct as reported to me. Bacitracin was applied, as well as a sterile dressing. The patient tolerated the procedure well.    COMPLICATIONS: none, immediately.    ESTIMATED BLOOD LOSS: <1 mL.    SPECIMEN(S): none.    DRAINS/TUBES: yellow vessel loop non-cutting seton x2    OPERATIVE FINDINGS: Transsphincteric fistulas x2, left posterior and left anterior which leads to an anterior left labial cavity without communication to the vagina    DISPOSITION: PACU.      Javier Zavala MD  Division of Colon and Rectal Surgery  [facility]  [phone]    Plan: setons to remain in place indefinitely.      CC:  [facility] Surgery billing.  Airam Lee NP.

## 2024-06-18 NOTE — ANESTHESIA CARE TRANSFER NOTE
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    Anesthesia Type:   MAC     Note:      Level of Consciousness: awake  Oxygen Supplementation: room air    Independent Airway: airway patency satisfactory and stable        Patient transferred to: Phase II    Handoff Report: Identifed the Patient, Identified the Reponsible Provider, Reviewed the pertinent medical history, Discussed the surgical course, Reviewed Intra-OP anesthesia mangement and issues during anesthesia, Set expectations for post-procedure period and Allowed opportunity for questions and acknowledgement of understanding  Vitals:  Vitals Value Taken Time   BP     Temp     Pulse     Resp     SpO2         Electronically Signed By: LESTER Santoro CRNA  June 18, 2024  3:33 PM

## 2024-06-18 NOTE — DISCHARGE INSTRUCTIONS
Anorectal Surgery Instructions    What can I expect after anorectal surgery?  Most anorectal procedures are done as outpatient surgery, and you go home the same day as the procedure. A few surgical procedures will require that you stay in the hospital for about one to three days. No matter where the procedure is done or how long or short it takes, these recommendations will help you heal and feel more comfortable.    Medicines:  The anal area is very sensitive; you can expect to have some pain for up to 2-4 weeks after the procedure. Your doctor will give you a prescription for one or more pain medications.    Take ibuprofen 600 mg four times a day OR naprosyn 500 mg twice a day  Take this on a regular basis (not as needed) following your surgery.   The drugs are best taken with food.  Do not take if it causes stomach upset or if you have a history of ulcers or gastritis. You can stop the naprosyn (or ibuprofen) or reduce the dose when you are feeling better.    Take acetominaphen (Tylenol) 650-1000 mg four times a day.   Take this on a regular basis (not as needed) following surgery for pain control.   Take the lower dose if you are >65 years old or have liver disease. The maximum dose of acetominaphen is 4000 mg a day. You can stop the acetaminophen or reduce the dose when you are feeling better.  It is important to realize that many narcotic pain relievers (including vicodin, percocet, tylenol #3) also have acetaminophen, and excessive doses of acetaminophen can be dangerous, so do not take these in addition to acetominaphen.  You may take narcotics that don't contain acetominaphen such as oxycodone.      Take oxycodone AS NEEDED in addition to the acetominaphen and naprosyn.    Because narcotics have side effects (including constipation), you should reduce your use of these medications as tolerated as your pain improves.    *In general, the best strategy is to take (if you are able to tolerate it) the tylenol  and ibuprofen on a regular basis until your pain has largely gone away. You can take the narcotic pain medicine as needed in addition to the tylenol and ibuprofen. As your pain begins to lessen, you should cut back on your narcotic use while continuing to take your regular tylenol and ibuprofen doses.    Refilling prescriptions. If you need additional pain medication, please call the triage nurse at [phone] during normal business hours (8 a.m. to 4 p.m., Monday though Friday) or have your pharmacy fax a refill request to [phone]. If you call after hours or on the weekends, the doctor on call may not know you personally and may not renew narcotic pain medication by phone. Call your primary care provider for all other medication refills.    Perineal care:  Tub baths:  If possible, take a tub bath or shower immediately after each bowel movement.   Baths should be take at least 3 times daily for the first week to 10 days following your procedure. You should soak in the tub for 10 to 15 minutes each time with water as warm as you can tolerate. You may also use a microphone shower head (with an extension) to shower your back-side instead of a bath.  Even after you go back to work, it is a good idea to sit in the tub in the morning, after returning from work, and again in the evening before bedtime.    Bleeding/Infection:  You can expect to have some bleeding after bowel movements, but it should stop soon after you wipe.   Use a wet cloth or perianal pad (Tucks or Preparation H pads) to gently wipe the area after each bowel movement.  Do not rub the anal area or use a lot of pressure.  Using a spray bottle filled with warm water helps loosen any remaining stool. Blot gently with a soft dry cloth or tissue paper.  Infection around the anal opening is not very common. The anal area has excellent blood supply, which helps the area to heal. Bloody discharge after bowel movements is normal and may last 2 to 4 weeks  after your surgery. However, if you bleed between bowel movements and cannot get it to stop, call the triage nurse immediately [phone].    Bowel function:  Take a fiber supplement such as Metamucil, which is over the counter. It is important to drink six to eight glasses of water or juice everyday when using fiber products.    If you do not have a bowel movement after 1-2 days:  Take Milk of Magnesia-2 tablespoons.     If there are no results, repeat this or add over the counter Miralax.    If you still do not have results, contact the clinic.   If there are no results, repeat this. Stop taking Milk of Magnesia or other laxatives if you begin to have diarrhea.    * Constipation will cause you to strain when you have a bowel movement. The hard stool will be difficult to pass, will increase pain and bleeding, and will slow down healing. Try to avoid constipation and/or diarrhea as this can make the pain and bleeding worse.    * It is important to have regular bowel movements at least every other day and to keep your stool soft. A high fiber diet, including at least four servings of fruits or vegetables daily, will help to keep your bowel movements regular and soft.    Activity:  After your procedure, there are no restrictions on your activity   Except restrictions because being on narcotics and in pain, such as no heavy machine operating or driving.   You may walk, climb stairs, ride in a car, and sit as tolerated.   It is helpful to avoid sitting in one position for long periods (2 or more hours).  After some surgeries, you may be told not to perform any lifting (more than 10 pounds) for several weeks after surgery.    When to call:  When do I need to call the doctor or triage nurse?  If you experience any of the problems listed here, call our triage nurse during business hours ([phone]).   The nurse will help you with your problem or have the doctor call you.   After hours and on weekends, please call the  "[facility] number ([phone]) and ask for the colon and rectal surgery person on call.   Call for:   ? Fever greater than 101 degrees   ? Chills   ? Foul-smelling drainage   ? Nausea and vomiting   ? Diarrhea - greater than 4 water stools in 24 hours   ? Constipation - no bowel movement after 3 days   ? Severe bleeding that does not stop soon after a bowel movement   ? Problems with the incision, including severe pain, swelling, or redness    [facility]  Home Care Following Anesthesia  For 24 hours after surgery:  Get plenty of rest.  A responsible adult must stay with you for at least 24 hours after you leave the surgery center.  Do not drive or use heavy equipment.  If you have weakness or tingling, don't drive or use heavy equipment until this feeling goes away.   Do not drink alcohol.   Avoid strenuous or risky activities.  Ask for help when climbing stairs.  You may feel lightheaded.  IF so, sit for a few minutes before standing.  Have someone help you get up.   If you have nausea (feel sick to your stomach): Drink only clear liquids such as apple juice, ginger ale, broth or 7-Up.  Rest may also help.  Be sure to drink enough fluids.  Move to a regular diet as you feel able.   You may have a slight fever.  Call the doctor if your fever is over 100 F (37.7 C) (taken under the tongue) or lasts longer than 24 hours.  You may have a dry mouth, a sore throat, muscle aches or trouble sleeping. These should go away after 24 hours.  Do not make important or legal decisions.   It is recommended to avoid smoking.        Today you received a Marcaine or bupivacaine block to numb the nerves near your surgery site.  This is a block using local anesthetic or \"numbing\" medication injected around the nerves to anesthetize or \"numb\" the area supplied by those nerves.  This block is injected into the muscle layer near your surgical site.  The medication may numb the location where you " had surgery for 6-18 hours, but may last up to 24 hours.  If your surgical site is an arm or leg you should be careful with your affected limb, since it is possible to injure your limb without being aware of it due to the numbing.  Until full feeling returns, you should guard against bumping or hitting your limb, and avoid extreme hot or cold temperatures on the skin.  As the block wears off, the feeling will return as a tingling or prickly sensation near your surgical site.  You will experience more discomfort from your incision as the feeling returns.  You may want to take a pain pill (a narcotic or Tylenol if this was prescribed by your surgeon) when you start to experience mild pain before the pain beccomes more severe.  If your pain medications do not control your pain you should notifiy your surgeon.    Tips for taking pain medications  To get the best pain relief possible, remember these points:  Take pain medications as directed, before pain becomes severe.  Pain medication can upset your stomach: taking it with food may help.  Constipation is a common side effect of pain medication. Drink plenty of  fluids.  Eat foods high in fiber. Take a stool softener if recommended by your doctor or pharmacist.  Do not drink alcohol, drive or operate machinery while taking pain medications.  Ask about other ways to control pain, such as with heat, ice or relaxation.    Tylenol/Acetaminophen Consumption    If you feel your pain relief is insufficient, you may take Tylenol/Acetaminophen in addition to your narcotic pain medication.   Be careful not to exceed 4,000 mg of Tylenol/Acetaminophen in a 24 hour period from all sources.  If you are taking extra strength Tylenol/acetaminophen (500 mg), the maximum dose is 8 tablets in 24 hours.  If you are taking regular strength acetaminophen (325 mg), the maximum dose is 12 tablets in 24 hours.    Call a doctor for any of the following:  Signs of infection (fever, growing  tenderness at the surgery site, a large amount of drainage or bleeding, severe pain, foul-smelling drainage, redness, swelling).  It has been over 8 to 10 hours since surgery and you are still not able to urinate (pass water).  Headache for over 24 hours.  Numbness, tingling or weakness the day after surgery (if you had spinal anesthesia).  Signs of Covid-19 infection (temperature over 100 degrees, shortness of breath, cough, loss of taste/smell, generalized body aches, persistent headache, chills, sore throat, nausea/vomiting/diarrhea)  Your doctor is:  Dr. Javier Zavala, Colon Rectal: [phone]                    Or dial [phone] and ask for the resident on call for:  Colon Rectal  For emergency care, call the:  [facility]:  [phone] (TTY for hearing impaired: [phone])

## 2024-06-18 NOTE — ANESTHESIA POSTPROCEDURE EVALUATION
Patient: Any Selby    Procedure: Procedure(s):  PLACEMENT, SETON STITCH X 2, EXAMINATION UNDER ANESETHESIA       Anesthesia Type:  MAC    Note:  Disposition: Outpatient   Postop Pain Control: Uneventful            Sign Out: Well controlled pain   PONV: No   Neuro/Psych: Uneventful            Sign Out: Acceptable/Baseline neuro status   Airway/Respiratory: Uneventful            Sign Out: Acceptable/Baseline resp. status   CV/Hemodynamics: Uneventful            Sign Out: Acceptable CV status; No obvious hypovolemia; No obvious fluid overload   Other NRE: NONE   DID A NON-ROUTINE EVENT OCCUR?            Last vitals:  Vitals Value Taken Time   /68 06/18/24 1600   Temp 36.2  C (97.1  F) 06/18/24 1600   Pulse 67 06/18/24 1600   Resp 20 06/18/24 1600   SpO2 99 % 06/18/24 1600       Electronically Signed By: David Rush MD  June 18, 2024  4:17 PM

## 2024-06-19 ENCOUNTER — NURSE TRIAGE (OUTPATIENT)
Dept: NURSING | Facility: CLINIC | Age: 79
End: 2024-06-19
Payer: COMMERCIAL

## 2024-06-19 NOTE — TELEPHONE ENCOUNTER
"  Nurse Triage SBAR    Is this a 2nd Level Triage? YES, LICENSED PRACTITIONER REVIEW IS REQUIRED    Situation: patient was told to pull out strings, but she feels that she should not.  She is not sure about them, she feels they should be there.  She is unsure how to keep them clean and is afraid to have a BM with them there.  Pt needs further instructions, about them and care and when they come out.    Background:   PLACEMENT, SETON STITCH X 2, EXAMINATION UNDER ANESETHESIA     Assessment:   Patient denies pain  Denies fever or chills  No BM yet  Patient only has questions about procedure yesterday and care and when they come out    Protocol Recommended Disposition:   Routing to Colon rectal for a call back    Recommendation:   DO NOT pull them out    Routed to provider        Reason for Disposition    Caller has URGENT question and triager unable to answer question    Additional Information    Negative: Sounds like a life-threatening emergency to the triager    Negative: Bright red, wide-spread, sunburn-like rash    Negative: SEVERE headache and after spinal (epidural) anesthesia    Negative: Vomiting and persists > 4 hours    Negative: Vomiting and abdomen looks much more swollen than usual    Negative: Drinking very little and dehydration suspected (e.g., no urine > 12 hours, very dry mouth, very lightheaded)    Negative: Patient sounds very sick or weak to the triager    Negative: Sounds like a serious complication to the triager    Answer Assessment - Initial Assessment Questions  1. SYMPTOM: \"What's the main symptom you're concerned about?\" (e.g., pain, fever, vomiting)      Strings from surgery, no one told me anything about it.  I was told yesterday to pull them out by someone while I was coming back from the bathroom.     #1  do they stay?                       #2  how long are they there, when do they come out#                       #3  afraid to go to the BR, how will she keep them clean  She feels as " "though, she knows nothing about this.  2. ONSET: \"When did   start?\"      yesterday  3. SURGERY: \"What surgery did you have?\"        4. DATE of SURGERY: \"When was the surgery?\"       06-  5. ANESTHESIA: \" What type of anesthesia did you have?\" (e.g., general, spinal, epidural, local)        6. PAIN: \"Is there any pain?\" If Yes, ask: \"How bad is it?\"  (Scale 1-10; or mild, moderate, severe)      Denies pain  7. FEVER: \"Do you have a fever?\" If Yes, ask: \"What is your temperature, how was it measured, and when did it start?\"      denies  8. VOMITING: \"Is there any vomiting?\" If Yes, ask: \"How many times?\"      denies  9. BLEEDING: \"Is there any bleeding?\" If Yes, ask: \"How much?\" and \"Where?\"      denies  10. OTHER SYMPTOMS: \"Do you have any other symptoms?\" (e.g., drainage from wound, painful urination, constipation)        No BM, she is afraid to have one until she  knows more    Protocols used: Post-Op Symptoms and [id number]    "

## 2024-06-21 ENCOUNTER — TELEPHONE (OUTPATIENT)
Dept: SURGERY | Facility: CLINIC | Age: 79
End: 2024-06-21
Payer: COMMERCIAL

## 2024-06-21 NOTE — TELEPHONE ENCOUNTER
[facility] Call [facility]    Phone Message    May a detailed message be left on voicemail: yes     Reason for Call: Symptoms or Concerns     If patient has red-flag symptoms, warm transfer to triage line    Current symptom or concern: Foul smell and swelling around anus.    Pt is wondering if she should take Tylenol.     Symptoms have been present for:  2 day(s)    Has patient previously been seen for this? Yes    By: Dr. Zavala    Date: 6-18-24    Are there any new or worsening symptoms? No    Action Taken: Message routed to:  [facility]    Travel Screening: Not Applicable

## 2024-07-02 ENCOUNTER — OFFICE VISIT (OUTPATIENT)
Dept: SURGERY | Facility: CLINIC | Age: 79
End: 2024-07-02
Payer: COMMERCIAL

## 2024-07-02 VITALS — SYSTOLIC BLOOD PRESSURE: 99 MMHG | HEART RATE: 77 BPM | DIASTOLIC BLOOD PRESSURE: 61 MMHG | OXYGEN SATURATION: 99 %

## 2024-07-02 DIAGNOSIS — K60.30 PERIANAL FISTULA: ICD-10-CM

## 2024-07-02 DIAGNOSIS — Z09 FOLLOW-UP EXAMINATION AFTER COLORECTAL SURGERY: Primary | ICD-10-CM

## 2024-07-02 PROCEDURE — 99024 POSTOP FOLLOW-UP VISIT: CPT | Performed by: NURSE PRACTITIONER

## 2024-07-02 ASSESSMENT — PAIN SCALES - GENERAL: PAINLEVEL: NO PAIN (0)

## 2024-07-02 NOTE — PATIENT INSTRUCTIONS
Start a daily fiber supplement. If you take fiber pills, will need to take 4-6 tablets a day. This will help bulk up your stools and make them more complete.  Return every 8-12 months for seton exchange in clinic.  Contact the clinic in the meantime with any questions or concerns.

## 2024-07-02 NOTE — NURSING NOTE
Chief Complaint   Patient presents with    Post-op Visit       Vitals:    07/02/24 1230   BP: 99/61   BP Location: Left arm   Patient Position: Sitting   Cuff Size: Adult Regular   Pulse: 77   SpO2: 99%       There is no height or weight on file to calculate BMI.    Cj Horner EMT-P

## 2024-07-02 NOTE — PROGRESS NOTES
Colon and Rectal Surgery Postoperative Clinic Note    RE: Any Selby  : 1945  MITZI: 2024    Any Selby is a very pleasant 78 year old female status post seton placement on 24 by Dr. Zavala for transsphincteric fistulas X2, left posterior and left anterior positions that lead to an anterior left labial cavity without communication to the vagina.     Interval history: Any has been doing well. No significant pain. Minimal drainage. She sits 30-60 minutes at a time to have bowel movements as they do not feel complete. Stools are not hard but tend to be on the looser side.    Physical Examination: Exam was chaperoned by Selena Spencer MA   BP 99/61 (BP Location: Left arm, Patient Position: Sitting, Cuff Size: Adult Regular)   Pulse 77   SpO2 99%   General: alert, oriented, in no acute distress, sitting comfortably  HEENT: mucous membranes moist    Perianal external examination:  Two yellow vessel loop setons in place with ties external to the tract. No erythema and minimal drainage.    Digital rectal examination: Was deferred.    Anoscopy: Was deferred.    Assessment/Plan:  78 year old female status post seton placement on 24 by Dr. Zavala for transsphincteric fistulas X2, left posterior and left anterior positions that lead to an anterior left labial cavity without communication to the vagina. Setons will remain in place long term. Return every 8-12 months for seton exchange or anytime in the meantime if she has any concerns. Patient's questions were answered to her stated satisfaction and she is in agreement with this plan.     Medical history:  Past Medical History:   Diagnosis Date    Refusal of blood transfusions as patient is Yarsani        Surgical history:  Past Surgical History:   Procedure Laterality Date    COLONOSCOPY      CV CORONARY ANGIOGRAM  2018    NO stent needed    EXAM UNDER ANESTHESIA RECTUM N/A 2024    Procedure: EXAM UNDER ANESTHESIA,  RECTUM;  Surgeon: Stu Francisco MD;  Location: [facility]    EYE MUSCLE SURGERY      INCISION AND DRAINAGE BUTTOCKS Left 10/14/2023    Procedure: Incision And Drainage Left Perirectal Abcess;  Surgeon: Brian Owusu MD;  Location: [facility]    PLACEMENT OF SETON RECTUM N/A 6/18/2024    Procedure: PLACEMENT, SETON STITCH X 2, EXAMINATION UNDER ANESETHESIA;  Surgeon: Javier Zavala MD;  Location: [facility] OR    TOTAL KNEE ARTHROPLASTY Right     TUBAL LIGATION         Problem list:    Patient Active Problem List    Diagnosis Date Noted    Perianal fistula 06/13/2024     Priority: Medium    Vaginal fistula 06/13/2024     Priority: Medium    Perianal abscess 10/02/2023     Priority: Medium    Perirectal abscess 10/02/2023     Priority: Medium    Chronic systolic congestive heart failure (H) 09/26/2023     Priority: Medium    Cognitive and behavioral changes 09/26/2023     Priority: Medium    Vulvar abscess 09/26/2023     Priority: Medium    Stage 3a chronic kidney disease (H) 09/26/2023     Priority: Medium    Gout of multiple sites, unspecified cause, unspecified chronicity 09/26/2023     Priority: Medium    Mixed hyperlipidemia 09/26/2023     Priority: Medium    Primary osteoarthritis of both knees 09/26/2023     Priority: Medium    DDD (degenerative disc disease), lumbar 09/26/2023     Priority: Medium    Refusal of blood transfusions as patient is Yazidism 09/26/2023     Priority: Medium    Osteoarthritis of right knee 01/27/2022     Priority: Medium     Formatting of this note might be different from the original. Added automatically from request for surgery [id number]      Osteopenia 10/31/2021     Priority: Medium    Anemia 08/23/2020     Priority: Medium    Acute nontraumatic kidney injury (H24) 01/30/2020     Priority: Medium    Hx of sepsis 01/30/2020     Priority: Medium     Formatting of this note might be different from the original. SEPTIC SHOCK [A41.9, R65.21]   HX OF SEPSIS [Z86.19]       Chronic kidney disease, stage 3a (H) 08/30/2019     Priority: Medium    Obstructive sleep apnea 01/24/2019     Priority: Medium     Formatting of this note might be different from the original. AHI 31.7, % < 90 % 0.0, ESS 1      Left heart failure (H) 01/04/2019     Priority: Medium    Atherosclerosis of aorta (H24) 12/07/2018     Priority: Medium    Arthritis 07/24/2014     Priority: Medium     Formatting of this note might be different from the original. Affecting hands, neck, shoulders and knees      Chronic pain 06/21/2012     Priority: Medium    Shoulder pain 06/14/2012     Priority: Medium     Formatting of this note might be different from the original. Bilateral. Chronic. Work related      Obesity 01/10/2011     Priority: Medium    Posterior calcaneal exostosis 01/10/2011     Priority: Medium       Medications:  Current Outpatient Medications   Medication Sig Dispense Refill    acetaminophen (TYLENOL) 325 MG tablet Take 325-650 mg by mouth every 6 hours as needed for mild pain      aspirin 81 MG EC tablet Take 1 tablet (81 mg) by mouth daily 90 tablet 3    carvedilol (COREG) 6.25 MG tablet TAKE 1/2 TABLET BY MOUTH TWICE DAILY WITH MEALS. 28 tablet 11    Cyanocobalamin (VITAMIN B-12 PO) Take by mouth daily Patient unsure of dosage      furosemide (LASIX) 20 MG tablet TAKE 1 TABLET BY MOUTH ONCE DAILY. 28 tablet 11    lisinopril (ZESTRIL) 10 MG tablet Take 0.5 tablets (5 mg) by mouth daily 90 tablet 3    senna (SENOKOT) 8.6 MG tablet Take 2 tablets by mouth every morning 180 tablet 3       Allergies:  No Known Allergies    Family history:  Family History   Problem Relation Age of Onset    Breast Cancer Paternal Aunt        Social history:  Social History     Tobacco Use    Smoking status: Never    Smokeless tobacco: Never   Substance Use Topics    Alcohol use: Not on file     Marital status: .  Nursing Notes:   Cj Horner, EMT  7/2/2024 12:31 PM  Signed  Chief Complaint   Patient presents with     Post-op Visit       Vitals:    07/02/24 1230   BP: 99/61   BP Location: Left arm   Patient Position: Sitting   Cuff Size: Adult Regular   Pulse: 77   SpO2: 99%       There is no height or weight on file to calculate BMI.    Cj Horner EMT-P       30 minutes spent on the date of the encounter doing chart review, history and exam, documentation and further activities as noted above.   This is a postop visit.    LESTER Collins, NP-C  Colon and Rectal Surgery  [facility]    This note was created using speech recognition software and may contain unintended word substitutions.

## 2024-07-02 NOTE — LETTER
2024       RE: Any Selby  [address] [place] [address]       Dear Colleague,    Thank you for referring your patient, Any Selby, to the [facility] [place] [facility]. Please see a copy of my visit note below.    Colon and Rectal Surgery Postoperative Clinic Note    RE: Any Selby  : 1945  MITZI: 2024    Any Selby is a very pleasant 78 year old female status post seton placement on 24 by Dr. Zavala for transsphincteric fistulas X2, left posterior and left anterior positions that lead to an anterior left labial cavity without communication to the vagina.     Interval history: Any has been doing well. No significant pain. Minimal drainage. She sits 30-60 minutes at a time to have bowel movements as they do not feel complete. Stools are not hard but tend to be on the looser side.    Physical Examination: Exam was chaperoned by Selena Spencer MA   BP 99/61 (BP Location: Left arm, Patient Position: Sitting, Cuff Size: Adult Regular)   Pulse 77   SpO2 99%   General: alert, oriented, in no acute distress, sitting comfortably  HEENT: mucous membranes moist    Perianal external examination:  Two yellow vessel loop setons in place with ties external to the tract. No erythema and minimal drainage.    Digital rectal examination: Was deferred.    Anoscopy: Was deferred.    Assessment/Plan:  78 year old female status post seton placement on 24 by Dr. Zavala for transsphincteric fistulas X2, left posterior and left anterior positions that lead to an anterior left labial cavity without communication to the vagina. Setons will remain in place long term. Return every 8-12 months for seton exchange or anytime in the meantime if she has any concerns. Patient's questions were answered to her stated satisfaction and she is in agreement with this plan.     Medical  history:  Past Medical History:   Diagnosis Date    Refusal of blood transfusions as patient is Restorationism        Surgical history:  Past Surgical History:   Procedure Laterality Date    COLONOSCOPY      CV CORONARY ANGIOGRAM  2018    NO stent needed    EXAM UNDER ANESTHESIA RECTUM N/A 5/6/2024    Procedure: EXAM UNDER ANESTHESIA, RECTUM;  Surgeon: Stu Francisco MD;  Location: [facility]    EYE MUSCLE SURGERY      INCISION AND DRAINAGE BUTTOCKS Left 10/14/2023    Procedure: Incision And Drainage Left Perirectal Abcess;  Surgeon: Brian Owusu MD;  Location: [facility]    PLACEMENT OF SETON RECTUM N/A 6/18/2024    Procedure: PLACEMENT, SETON STITCH X 2, EXAMINATION UNDER ANESETHESIA;  Surgeon: Javier Zavala MD;  Location: [facility] OR    TOTAL KNEE ARTHROPLASTY Right     TUBAL LIGATION         Problem list:    Patient Active Problem List    Diagnosis Date Noted    Perianal fistula 06/13/2024     Priority: Medium    Vaginal fistula 06/13/2024     Priority: Medium    Perianal abscess 10/02/2023     Priority: Medium    Perirectal abscess 10/02/2023     Priority: Medium    Chronic systolic congestive heart failure (H) 09/26/2023     Priority: Medium    Cognitive and behavioral changes 09/26/2023     Priority: Medium    Vulvar abscess 09/26/2023     Priority: Medium    Stage 3a chronic kidney disease (H) 09/26/2023     Priority: Medium    Gout of multiple sites, unspecified cause, unspecified chronicity 09/26/2023     Priority: Medium    Mixed hyperlipidemia 09/26/2023     Priority: Medium    Primary osteoarthritis of both knees 09/26/2023     Priority: Medium    DDD (degenerative disc disease), lumbar 09/26/2023     Priority: Medium    Refusal of blood transfusions as patient is Restorationism 09/26/2023     Priority: Medium    Osteoarthritis of right knee 01/27/2022     Priority: Medium     Formatting of this note might be different from the original. Added automatically from request for surgery  [id number]      Osteopenia 10/31/2021     Priority: Medium    Anemia 08/23/2020     Priority: Medium    Acute nontraumatic kidney injury (H24) 01/30/2020     Priority: Medium    Hx of sepsis 01/30/2020     Priority: Medium     Formatting of this note might be different from the original. SEPTIC SHOCK [A41.9, R65.21]   HX OF SEPSIS [Z86.19]      Chronic kidney disease, stage 3a (H) 08/30/2019     Priority: Medium    Obstructive sleep apnea 01/24/2019     Priority: Medium     Formatting of this note might be different from the original. AHI 31.7, % < 90 % 0.0, ESS 1      Left heart failure (H) 01/04/2019     Priority: Medium    Atherosclerosis of aorta (H24) 12/07/2018     Priority: Medium    Arthritis 07/24/2014     Priority: Medium     Formatting of this note might be different from the original. Affecting hands, neck, shoulders and knees      Chronic pain 06/21/2012     Priority: Medium    Shoulder pain 06/14/2012     Priority: Medium     Formatting of this note might be different from the original. Bilateral. Chronic. Work related      Obesity 01/10/2011     Priority: Medium    Posterior calcaneal exostosis 01/10/2011     Priority: Medium       Medications:  Current Outpatient Medications   Medication Sig Dispense Refill    acetaminophen (TYLENOL) 325 MG tablet Take 325-650 mg by mouth every 6 hours as needed for mild pain      aspirin 81 MG EC tablet Take 1 tablet (81 mg) by mouth daily 90 tablet 3    carvedilol (COREG) 6.25 MG tablet TAKE 1/2 TABLET BY MOUTH TWICE DAILY WITH MEALS. 28 tablet 11    Cyanocobalamin (VITAMIN B-12 PO) Take by mouth daily Patient unsure of dosage      furosemide (LASIX) 20 MG tablet TAKE 1 TABLET BY MOUTH ONCE DAILY. 28 tablet 11    lisinopril (ZESTRIL) 10 MG tablet Take 0.5 tablets (5 mg) by mouth daily 90 tablet 3    senna (SENOKOT) 8.6 MG tablet Take 2 tablets by mouth every morning 180 tablet 3       Allergies:  No Known Allergies    Family history:  Family History   Problem Relation  Age of Onset    Breast Cancer Paternal Aunt        Social history:  Social History     Tobacco Use    Smoking status: Never    Smokeless tobacco: Never   Substance Use Topics    Alcohol use: Not on file     Marital status: .  Nursing Notes:   Cj Horner EMT  7/2/2024 12:31 PM  Signed  Chief Complaint   Patient presents with    Post-op Visit       Vitals:    07/02/24 1230   BP: 99/61   BP Location: Left arm   Patient Position: Sitting   Cuff Size: Adult Regular   Pulse: 77   SpO2: 99%       There is no height or weight on file to calculate BMI.    Cj Horner EMT-P       30 minutes spent on the date of the encounter doing chart review, history and exam, documentation and further activities as noted above.   This is a postop visit.      This note was created using speech recognition software and may contain unintended word substitutions.        Again, thank you for allowing me to participate in the care of your patient.      Sincerely,    LESTER William CNP

## 2024-09-28 NOTE — LETTER
MEDICAL ICU PROGRESS NOTE  /09/28/2024    Date of Service (when I saw the patient): 09/28/2024    ASSESSMENT: Massiel Flaherty is a 55 year old female with PMH Anxiety/depression, fibromyalgia, c/f nonepileptic seizures not on AEDs, hypothyroidism, asthma, HLD, MURIEL on CPAP, expressive aphasia, hypertension who was admitted on 8/3/2024 for fatigue, fever and dyspnea and intubated 8/6/24 at OSH for ARDS, proned and paralyzed without improvement. Transferred to [facility] 8/8/24, hypoxic with high plateaus/peaks and placed on VV ECMO and CRRT. Decannulated from VV ECMO 8/18 and transferred to MICU 8/26/24 for ongoing management. Extubated 8/27 then reintubated 8/29 iso worsening AHRF and pseudomonas pneumonia. Extubated again 9/16 to BiPAP/HFNC, re-intubated 9/20 with tachypnea, increased WOB, fevers, and new lung opacities c/f possible HAP vs ILD/vasculitis/organizing pneumonia flare. Course complicated by tension pneumothorax while re-intubated 9/20 now s/p right chest tube.      CHANGES and MAJOR THINGS TODAY:   - Tolerated veletri discontinuation yesterday afternoon  - discontinue IV solumedrol, start 60 mg oral prednisone tomorrow with plans to continue to taper down as able  - Increase free water flushes to 200 ml Q3 hours, BMP to assess sodium at 1400  - continue to titrate sedation as tolerated today. Patient on scheduled Ativan. Attempt to reduce versed slowly throughout course of day.  - Would consider scheduling oxycodone when starting to reduce fentanyl      PLAN:    Neuro:  # Pain and sedation  # Concern for ICU delirium   # Hx Fibromyalgia   # Hx myalgic encephalomyelitis   # Hx anxiety/depression  - Pain: Oxycodone 2.5 mg q6hrs PRN, fentanyl bolus PRN   - Sedation plan:   - Sedated with fentanyl, ketamine, versed, and precedex infusion. Plan for 9/28: Precedex has been reduced yesterday (now at 0.6). Patient on scheduled ativan, 2 mg Q6 hours. Plan to wean versed drip as tolerated today.  - Vecuronium  2024       RE: Any Selby  [address] [place] [address]     Dear Colleague,    Thank you for referring your patient, Any Selby, to the [facility] [place] at [facility]. Please see a copy of my visit note below.    Colon and Rectal Surgery Clinic Note    RE: Any Selby.  : 1945.  MITZI: 2024.    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     HPI: Any is a 78 year old female a history of a perianal abscess that has been drained multiple times, she has also had a vulvar abscess last summer 2023 that was drained x2    Endoanal Ultrasound 24 by Dr. Christopher       Examination under anesthesia, rectum by Dr Stu Francisco 24:  FINDINGS: Complex abscess along the left side of the perineum.  Extending both anteriorly and posteriorly.  2 fistulas involving the skin.  These did not seem to connect.  No absolute tract from either fistula into the rectum or into the vagina.  Defect along the left posterior wall of the vagina.  Cervical polyp    DESCRIPTIONS OF PROCEDURE IN DETAIL: After consent was obtained the patient was taken to the operative suite and buffy in the supine position.  The patient was identified and the correct patient was confirmed.  General Endotracheal Anesthesia was administered by anesthesia.  The patient was placed in the high lithotomy and all pressure points were checked.  The patient was sterilely prepped and draped in the usual fashion.  A time out was performed verifying the correct patient and the correct procedure.  The entire operative team was in agreement.  All necessary equipment and supplies were in the room.     On initial investigation there were 2 areas of possible fistula.  1 anteriorly and 1 posterior.  The anterior 1 was probed and entered into a relatively large cavity which extended both anteriorly and  infusion off since 9/23 at 1200   - Gabapentin to 300mg TID held while sedated  - Psych meds (venlafaxine and amitriptyline) held while sedated  - RASS -2 to -3,  will attempt to wean sedation as tolerated and liberalize RASS as able     # Hx spells with staring and BUE posturing previously described as nonepileptic seizures   # Hx of GTC seizures and myoclonic epilepsy, weaned off AEDs   # Diffuse cerebral edema - improved  - 8/21: MRI Brain: no acute intracranial pathology. No findings to suggest anoxic brain injury.   - MRI brain 9/20: no anoxic brain injury  - While extubated, patient was able to follow commands and answer yes/no questions   - continue to monitor neurological exam     Pulmonary:  # Acute hypoxic respiratory failure c/b ARDS  # Pseudomonas pneumonia  # Mechanical ventilation  # s/p VV ECMO 8/8 - 8/18   # Hx CAP  # Asthma  # MURIEL on home CPAP  # S/p tracheostomy  PFT dated 9/8/2020 demonstrated normal spirometry with mild diffusion impairment and mild restriction (FEV1/FVC 80%, FEV1 2.59, DLCO 40%). CT abdomen chest 3/9/2020 demonstrated scarring and fibrosis bilaterally which correlated with the decreased DLCO. The patient also has a history of MURIEL on CPAP therapy as currently managed by her provider in [place]. Unclear what triggered ARDS or why she has had such severe hospital course, further investigated ILD but results all unremarkable. Extubated 8/27, reintubated 8/29 for worsening O2 demands and diffuse opacities iso new/recurrent psuedomonas pneumonia. Bronch with BAL 8/30, pseudomonas growing as below. Extubated again 9/16, worsening respiratory distress noted 9/19 with repeat polymicrobial growth on respiratory sputum raising c/f aspiration/hospital associated pneumonia. 9/20 with increased WOB, new fevers, and CXR significant for opacities c/f possible HAP vs ILD/vasculitis/organizing pneumonia and was reintubated 9/20. Now s/p tracheostomy on 9/25. 9/28: Sputum positive for  posteriorly.  There was a large amount of tissue noted between the rectum and the cavity.  When probed towards the vagina there was minimal tissue noted.  The posterior wound was also probed which probed more towards the rectum.  No evidence of fistula was noted.  No communication into the more anterior cavity was also found.      Because of the possible involvement with the vagina Dr. Alcazar of OB/GYN was interoperatively consulted.  Please see his notes for his findings.      Again no absolute defect from the anterior or posterior fistula openings along the perineum into either the rectum or the vagina were noted.  Decided to halt the procedure.  All instrumentation was removed.  All needle,  instrument and sponge counts were correct x2.  The patient was awakened in the operating room and taken to the recovery room in stable condition tolerated procedure well.      Incision and drainage of recurrent left perirectal abscess 10/14/23   Findings: Large recurrent abscess cavity in the area seen on CT scan in the left perirectal location, estimated to be at least 200 cc in volume.  Aerobic and anaerobic cultures were taken.     The patient was taken to the operating room where she was placed in the supine lithotomy position under monitored anesthesia care.  After appropriate timeout, the buttocks area and midline cleft were prepped and draped in the usual sterile fashion.  0.25% bupivacaine with epinephrine was used to anesthetize the skin and subcutaneous tissues surrounding her previous, healing, I&D site.  An elliptical incision was made to excise that previous wound.  This specimen was sent to pathology for evaluation.  The dissection was carried into the deeper subcutaneous plane using electrocautery to ensure simultaneous hemostasis.  The abscess cavity was entered without difficulty.  Cultures were obtained from within the cavity.  The cavity extended anteriorly from the entry site so the skin was incised  pseudomonas again. Query colonization versus ongoing infection as patient was febrile on 9/26 and has low grade temperature elevations on 9/27 and 9/28. Could consider precedex as a possible etiology for temperature elevation as well.   - ILD w/u aldolase, EVELIO, RF, CCP, ANCA, MPO, SSA Ro and La, Scleroderma antibody, Radha 1,  hypersensity pneumonitis, IGG subclasses,  aspergillus, blastomyces, histoplasma negative  - SpO2 goals >92%, PaO2 goals >60   - PTA singular at bedtime   - xopenex and ipratropium Q4 hours  - pulmicort currently on hold  - Lung protective ventilation strategies given ARDS   - Steroids:  - CRP trend 9/21 487,  9/23 166, 9/24 77, 9/25 47, 9/27 49.70  - Methylpred 60mg q6H 9/21- 9/24, 60 mg BID daily 9/25-9/27, 60 mg daily on 9/28  - 9/28: start 60 mg prednisone daily on 9/29 with plans to continue to taper as tolerated  - ABG daily and PRN      FiO2 (%): 55 %, Resp: 27, Vent Mode: CMV/AC, Resp Rate (Set): 26 breaths/min, Tidal Volume (Set, mL): 350 mL, PEEP (cm H2O): 5 cmH2O, Resp Rate (Set): 26 breaths/min, Tidal Volume (Set, mL): 350 mL, PEEP (cm H2O): 5 cmH2O       Pneumothorax, resolved   Tension pneumothorax during re-intubation 9/20, concern for fibrotic lung disease and possible barotrauma during bag ventilation during re-intubation vs complication of re-intubation in general. Leave in while intubated.   - chest tube removed on 9/27 with no recurrence of pneumothorax      Cardiovascular:  # Hyperlipidemia  # Hx HTN   # Sinus Tachycardia  - MAP goal >65, SBP goals >110  - PTA atorvastatin  - PTA clonidine held while sedated  - Lower extremity ultrasound without vascular pathology, no hematoma or clots  - Monitor discoloration of extremities for necrosis  - PRN hydralazine or labetolol for SBP > 180, plan to use labetolol rather than hydralazine if tachycardic  - 9/28: tachycardia and hypertension improved today, likely was related to patient not tolerating sedation wean. Now improved with  slightly in an anterior direction to allow better access.  The remaining purulent fluid was aspirated from the cavity.  Digital examination of the cavity was performed gently to lyse any loculations.  The cavity extended slightly anteriorly and slightly posteriorly along the deeper subcutaneous plane.  After ensuring hemostasis and adequate evacuation of that abscess, iodoform gauze was packed into these cavernous areas, with a tail of that gauze left protruding from the wound.  The surrounding skin was cleansed and dried.  A sterile ABD pad and mesh panties were applied as the patient was taken down out of lithotomy position.  She tolerated the procedure well.  Counts reported as correct.  Blood loss estimated to be about 20 cc.  She was then allowed to arouse from the depths of her anesthesia and [facility].  Thank you.    CT A/P 10/14/23- Recurrent 7.4 cm left perianal abscess.      CT A/P 10/2/23   IMPRESSION:  Rim-enhancing fluid collection with a few foci of air in the  subcutaneous tissues of the left buttocks adjacent to the left aspect  of the anus measuring up to 6.8 cm, most compatible with a perianal  abscess. There is no discrete supralevator extension.    CT A/P 8/16/23  Left labial soft tissue heterogeneous collection measuring up to 7.5 cm, relatively similar in size compared to prior however with more heterogeneous density. Additional bilateral labial soft tissue nodularity, measuring again up to 7.5 cm in the right labia. Findings are incompletely characterized on noncontrast evaluation however may reflect phlegmonous change versus early fluid collection.     GYNECOLOGIC EXAM UNDER REGIONAL ANESTHESIA VULVAR ABSCESS INCISION AND DRAINAGE 8/17/23 (in [place])  Findings:  Large 7 x 5cm left labial/vulvar abscess draining about 100 cc purulent, foul smelling and blood tinged fluid. Stab incision made outside of hymenal ring in vulvar  epidermis at 5 o'clock, incision extended to 2cm, 10mm round ADEBAYO drain placed and sutured with 2-0 nylon. Abscess completely decompressed     Procedure in Detail:  The patient was placed in dorsal lithotomy position. Prepped and draped in standard fashion.   After bimanual exam performed, findings as above.   A stab incision made with 11 blade scalpel at most fluctuant site just outside hymenal ring at labia majus epidermis with immediate release of purulent, foul smelling fluid drained. Incision then extended to 2cm. Remainder of fluid drained with manual compression and exploration with jason clamps. Cavity measuring 4x3 cm when probed. After drainage of purulent fluid, serosanguinous fluid drained and some harpal blood ~ 50 cc.   Size 10mm ADEBAYO drain placed, tracking about 3cm inside cavity and sewed to epithelium with 2-0 nylon. Area hemostatic. 5 cc of lidocaine 1.5% with epi injected at incision site.          Medical history:  Past Medical History:   Diagnosis Date    Refusal of blood transfusions as patient is Mandaeism        Surgical history:  Past Surgical History:   Procedure Laterality Date    COLONOSCOPY      CV CORONARY ANGIOGRAM  2018    NO stent needed    EXAM UNDER ANESTHESIA RECTUM N/A 5/6/2024    Procedure: EXAM UNDER ANESTHESIA, RECTUM;  Surgeon: Stu Francisco MD;  Location: [facility] OR    EYE MUSCLE SURGERY      INCISION AND DRAINAGE BUTTOCKS Left 10/14/2023    Procedure: Incision And Drainage Left Perirectal Abcess;  Surgeon: Brian Owusu MD;  Location: [facility] OR    TOTAL KNEE ARTHROPLASTY Right     TUBAL LIGATION         Family history:  Family History   Problem Relation Age of Onset    Breast Cancer Paternal Aunt        Medications:  Current Outpatient Medications   Medication Sig Dispense Refill    aspirin 81 MG EC tablet Take 1 tablet (81 mg) by mouth daily 90 tablet 3    carvedilol (COREG) 6.25 MG tablet Take 0.5 tablets (3.125 mg) by mouth 2 times daily (with meals) 90 tablet  increased scheduled oral ativan.     GI/Nutrition:  # Severe malnutrition (see RD note)   # Non-infectious Diarrhea  # GERD   # s/p PEG on 9/25/24  - Protonix (home medication)   - Nutrition consulted, appreciate recs  - Diet: TF at goal, on high fiber feed   - Hold bowel regimen d/t loose stools  - Continue scheduled multivitamin  - Loperamide PRN  - Rectal tube, output slowing down, continue to monitor  - 9/28: rectal tube output continues to decrease, nurse to assess for ability to remove rectal tube    Renal/Fluids/Electrolytes:  # Acute kidney injury, resolved   # AGMA, improving   # Elevated lactate, resovled  Baseline Cr approx 0.6. Pt was anuric required CRRT here but now making urine, likely prerenal due to shock.  - Adequate I/Os, daily weights   - Avoid nephrotoxins as able  - RN managed electrolyte replacement protocol  - Diuresis:   - 9/28: goal net even, will diurese if needed    Hypernatremia (currently resolved)  - 9/28: increase FWF 200ml Q3H  - Continue to trend  - Repeat BMP at 1400     Endocrine:  # Steroid and stress induced hyperglycemia  # Hypothyroidism   - Goal to keep BG < 180 for optimal wound healing  - Continue on insulin infusion while on high dose steroids   - Continue PTA synthroid  - continue insulin drip today while on high dose steroids, plan to transition to sliding scale in coming days     ID:  # Leukocytosis, resolved   # Concern for aspiration pneumonia/hospital acquired pnemonia (9/19 - )  # Pseudomonas pneumonia (s/p treatment) with c/f possible ongoing infection vs colonization (pseudomonas 1+ in sputum and fever on 9/26)   # Hx CAP  Respiratory cx 8/29 pseudomonas pneumonia. Intermediate susceptability to meropenem, more significant sensitivities to ciprofloxacin. New leukocytosis, elevated PC, CRP and lactate 9/19 with sputum cx 4+ psuedomonas, 2+ GPCs, and 1+ GPBs could be colonized , BAL studies 9/21 positive for pseudomonas. Sputum from 9/26 showing 1+ GNB, patient had  "3    Cyanocobalamin (VITAMIN B-12 PO) Take by mouth daily Patient unsure of dosage      furosemide (LASIX) 20 MG tablet Take 1 tablet (20 mg) by mouth daily 90 tablet 3    lisinopril (ZESTRIL) 10 MG tablet Take 0.5 tablets (5 mg) by mouth daily 90 tablet 3    senna (SENOKOT) 8.6 MG tablet Take 2 tablets by mouth every morning 180 tablet 3       Allergies:  No Known Allergies    Social history:   Social History     Tobacco Use    Smoking status: Never    Smokeless tobacco: Never   Substance Use Topics    Alcohol use: Not on file     Marital status: .    Physical Examination:  /69 (BP Location: Left arm, Patient Position: Sitting, Cuff Size: Adult Regular)   Pulse 65   Ht 1.664 m (5' 5.5\")   Wt 73.7 kg (162 lb 8 oz)   SpO2 99%   BMI 26.63 kg/m    Perianal external examination:  Two obvious external openings, left anterior and left posterior external opening 3-4 cm from the anal verge.  Digital rectal examination: Was performed.   Sphincter tone: Good.  Palpable lesions: yes, likely two fistula tracts following the external openings  Other: None.  Bimanual examination: was not performed.    Anoscopy: Was performed.   Hemorrhoids: No significant internal hemorrhoids.  Lesions: large external skin tags.    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       PLAN  1. EUA with seton placement x2 to allow for adequate drainage.    45 minutes spent on the date of encounter performing chart review, history and exam, documentation.        Again, thank you for allowing me to participate in the care of your patient.      Sincerely,    Javier Zavala MD, MD    " a fever to 102.9 as well. C/f possible ongoing infection.   - Continue to monitor fever curve and inflammatory markers as appropriate  - ID now signed off   - Repeat cultures and sputum growing pseudomonas with similar resistance pattern to previous.   - 9/28: If fever recurs or patient clinically deteriorates, may consider resuming pseudomonas treatment     Abx  - Meropenem 8/29 - 9/1  - Vancomycin 8/29 - 8/30  - Tobramycin x 1 8/29  - Vancomycin 9/5 - 9/8    - Tobramycin nebs BID 9/1 - 9/11, 9/21- 9/25  - Ciprofloxacin infusion 9/1 - 9/11  - Metronidazole 9/6 - 9/11  - Atovaquone 9/11 -   - Vancomycin 9/19 - 9/20  - Zosyn 9/19 - 9/21  - Ciprofloxacin 9/19 - 9/21     PJP Ppx  Given Dex-ARDS Massiel will likely remain on steriods for > 3 weeks so will initiate PJP ppx. Given history of allergy to sulfa, will obtain G6PD test to determine if dapsone can be used: levels are ok, however continuing atovaquone.   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   - Ganciclovir 9/6 - 9/8, discontinued given ID recs  - CMV levels recheck 9/18 shows continued downtrending, level at 289    Thrush  Noted 9/21  - Fluconazole x 7 days (stop date added)      # HSV-1  Mouth sores present, which could have viral etiology. 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   - Hematoma stable on repeat US 9/21  - Received 1 unit pRBCs 9/24 for hgb 6.9  - Daily CBC  - Transfuse if hgb <7.0 or signs/symptoms of hypoperfusion. Monitor and trend.      Musculoskeletal/Rheum:  # Alopecia  - Hold PTA hydroxychloroquine      # Weakness and deconditioning of critical  illness  # Left ankle injury pre-hospitalization    - Physical and occupational therapy when able.      Skin:  # BLE scattered ecchymosis  # Left toe duskiness  # Peripheral Edema  - WOC consult  - OT consult for lymphedema therapy     General Cares/Prophylaxis:  DVT Prophylaxis: SubQ heparin  GI Prophylaxis: PPI  Restraints: no  Family: Will try to call mother  Code Status: Full Code     Lines/tubes/drains:  - PIV x3  - Rectal tube (8/17)  - Tracheostomy 9/25  - PEG 9/25  - PICC 9/27     Disposition:  - Medical ICU     Patient seen and findings/plan discussed with medical ICU staff, Dr. Perlman.    DOREEN Gonzales CNP    Critical Care time: 35 minutes     Clinically Significant Risk Factors         # Hypernatremia: Highest Na = 151 mmol/L in last 2 days, will monitor as appropriate    # Hypomagnesemia: Lowest Mg = 1.6 mg/dL in last 2 days, will replace as needed   # Hypoalbuminemia: Lowest albumin = 2.2 g/dL at 8/10/2024  9:47 AM, will monitor as appropriate                  # Severe Malnutrition: based on nutrition assessment    # Financial/Environmental Concerns: none              ====================================  INTERVAL HISTORY:   Attempting sedation wean again today. With precedex off patient is again tachypneic and tachycardic and requiring more oxygen. Veletri was also stopped this morning which may be contributing, currently awaiting follow up ABG. Patient had fever of 102.9 yesterday evening, sputum grew 1+ GNB, considering possible ongoing pseudomonas infection vs colonization and fever of other etiology. No leukocytosis on labs.     OBJECTIVE:   1. VITAL SIGNS:   Temp:  [98.3  F (36.8  C)-100.1  F (37.8  C)] 100.1  F (37.8  C)  Pulse:  [] 109  Resp:  [26-33] 27  BP: (117-182)/(66-97) 122/71  MAP:  [65 mmHg-159 mmHg] 159 mmHg  Arterial Line BP: ()/() 114/56  FiO2 (%):  [50 %-75 %] 55 %  SpO2:  [88 %-100 %] 97 %  FiO2 (%): 55 %, Resp: 27, Vent Mode: CMV/AC, Resp Rate (Set): 26  breaths/min, Tidal Volume (Set, mL): 350 mL, PEEP (cm H2O): 5 cmH2O, Resp Rate (Set): 26 breaths/min, Tidal Volume (Set, mL): 350 mL, PEEP (cm H2O): 5 cmH2O  2. INTAKE/ OUTPUT:   I/O last 3 completed shifts:  In: 2501.21 [I.V.:1031.21; NG/GT:270]  Out: 2345 [Urine:1885; Stool:400; Chest Tube:60]    3. PHYSICAL EXAMINATION:  General: sedated  HEENT: NC/AT  Neuro: deeply sedated  Pulm/Resp: tracheostomy present, lung sounds course, tachypnea improved since previous exam  CV: RRR, S1/2, no m/r/g  Abdomen: Soft, tender, bowel sounds present, right groin hematoma stable  : Gonzalez with yellow, clear urine   Incisions/Skin: Trace to mild edema in all extremities, equally. Lower leg ecchymosis present and scattered. LL inguinal area with ecchymotic discoloration     4. LABS:   Arterial Blood Gases   Recent Labs   Lab 09/28/24 0328 09/27/24  1133 09/27/24 0419 09/26/24  2341   PH 7.39 7.36 7.40 7.41   PCO2 51* 54* 46* 47*   PO2 140* 108* 95 132*   HCO3 31* 30* 29* 29*     Complete Blood Count   Recent Labs   Lab 09/28/24  0328 09/27/24  0419 09/26/24  0345 09/25/24  0400   WBC 9.0 7.0 5.7 7.6   HGB 7.2* 8.0* 7.4* 8.2*    254 233 234     Basic Metabolic Panel  Recent Labs   Lab 09/28/24  0652 09/28/24  0614 09/28/24  0458 09/28/24  0401 09/28/24 0328 09/27/24 1956 09/27/24 1951 09/27/24  0603 09/27/24 0419 09/26/24 2211 09/26/24 2008 09/26/24 0418 09/26/24  0345   NA  --   --   --   --  151*  --   --   --  146*  --  145  --  145   POTASSIUM  --   --   --   --  4.0  --  4.5  --  3.5  --  4.2  --  4.0   CHLORIDE  --   --   --   --  113*  --   --   --  110*  --  108*  --  108*   CO2  --   --   --   --  27  --   --   --  25  --  26  --  23   BUN  --   --   --   --  22.4*  --   --   --  27.6*  --  34.0*  --  42.2*   CR  --   --   --   --  0.22*  --   --   --  0.25*  --  0.28*  --  0.31*   * 172* 147* 127* 131*   < >  --    < > 155*   < > 130*   < > 98    < > = values in this interval not displayed.      Liver Function Tests  Recent Labs   Lab 09/23/24  0404 09/22/24  0759   AST 57* 13   ALT 93* 56*   ALKPHOS 462* 207*   BILITOTAL 0.5 0.2   ALBUMIN 2.7* 2.5*   INR  --  1.29*     Coagulation Profile  Recent Labs   Lab 09/22/24  0759   INR 1.29*   PTT 33       5. RADIOLOGY:   Recent Results (from the past 24 hour(s))   XR Chest 1 View    Narrative    Exam: XR CHEST 1 VIEW, 9/27/2024 12:37 PM    Comparison: Radiograph same day, 9/26/2024    History: Picc    Findings:  Portable AP view of the chest. Tracheostomy tube tip projects over the  upper thoracic trachea. Right IJ and left IJ central venous catheter  tips project in stable position. Grossly stable cardiomediastinal  silhouette. Persistent bilateral pleural effusions. Slightly decreased  diffuse hazy opacities throughout the lung. Right chest tube has been  removed. No appreciable pneumothorax. No PICC identified.      Impression    Impression:   1. No PICC identified.  2. Right chest tube has been removed. No appreciable pneumothorax.  3. Slightly decreased diffuse hazy opacities bilaterally.     I have personally reviewed the examination and initial interpretation  and I agree with the findings.    THELMA PEREZ DO         SYSTEM ID:  [id number]   XR Chest Port 1 View    Impression    RESIDENT PRELIMINARY INTERPRETATION  IMPRESSION: Ongoing diffuse bilateral mixed interstitial and airspace  opacities as well as small left greater than right pleural effusions.

## 2024-10-01 PROBLEM — Z53.1 PROCEDURE AND TREATMENT NOT CARRIED OUT BECAUSE OF PATIENT'S DECISION FOR REASONS OF BELIEF AND GROUP PRESSURE: Status: ACTIVE | Noted: 2023-09-26

## 2024-10-30 ENCOUNTER — TELEPHONE (OUTPATIENT)
Dept: FAMILY MEDICINE | Facility: OTHER | Age: 79
End: 2024-10-30

## 2024-10-30 NOTE — TELEPHONE ENCOUNTER
1:14 PM    Reason for Call: Phone Call    Description: pt called would like to talk to a nurse about getting the flu and covid shots together. She did schedule for tomorrow 10-31 but wants to know if there are side effects of getting them together or if it is safe. Did explain pt have been getting them together but she would really talk to a nurse that is more knowledgeable than writer. Please call pt    Was an appointment offered for this call? Yes  If yes : Appointment type              Date    Preferred method for responding to this message: Telephone Call  What is your phone number ? [phone]     If we cannot reach you directly, may we leave a detailed response at the number you provided? Yes    Can this message wait until your PCP/provider returns, if available today? Abiola Turner

## 2024-10-31 ENCOUNTER — ALLIED HEALTH/NURSE VISIT (OUTPATIENT)
Dept: FAMILY MEDICINE | Facility: OTHER | Age: 79
End: 2024-10-31
Attending: FAMILY MEDICINE
Payer: MEDICARE

## 2024-10-31 DIAGNOSIS — Z23 NEED FOR PROPHYLACTIC VACCINATION AND INOCULATION AGAINST INFLUENZA: Primary | ICD-10-CM

## 2024-10-31 PROCEDURE — G0008 ADMIN INFLUENZA VIRUS VAC: HCPCS

## 2024-10-31 PROCEDURE — 90480 ADMN SARSCOV2 VAC 1/ONLY CMP: CPT

## 2024-11-04 DIAGNOSIS — R46.89 COGNITIVE AND BEHAVIORAL CHANGES: ICD-10-CM

## 2024-11-04 DIAGNOSIS — I70.0 ATHEROSCLEROSIS OF AORTA (H): Chronic | ICD-10-CM

## 2024-11-04 DIAGNOSIS — K59.00 CONSTIPATION, UNSPECIFIED CONSTIPATION TYPE: ICD-10-CM

## 2024-11-04 DIAGNOSIS — R41.89 COGNITIVE AND BEHAVIORAL CHANGES: ICD-10-CM

## 2024-11-05 NOTE — TELEPHONE ENCOUNTER
aspirin 81 MG EC tablet 90 tablet 3 10/13/2023       lisinopril (ZESTRIL) 10 MG tablet 90 tablet 3 10/12/2023     senna (SENOKOT) 8.6 MG tablet 180 tablet 3 10/13/2023   Last Office Visit: 06/17/2024  Future Office visit:       Routing refill request to provider for review/approval because:

## 2024-11-07 RX ORDER — LISINOPRIL 5 MG/1
5 TABLET ORAL DAILY
Qty: 90 TABLET | Status: SHIPPED | OUTPATIENT
Start: 2024-11-07

## 2024-11-07 RX ORDER — SENNOSIDES 8.6 MG
2 TABLET ORAL DAILY
Qty: 70 TABLET | Status: SHIPPED | OUTPATIENT
Start: 2024-11-07

## 2024-11-07 RX ORDER — ASPIRIN 81 MG/1
81 TABLET, COATED ORAL DAILY
Qty: 35 TABLET | Refills: 3 | Status: SHIPPED | OUTPATIENT
Start: 2024-11-07

## 2024-11-07 NOTE — TELEPHONE ENCOUNTER
ACE Inhibitors (Including Combos) Protocol Failed    Rerun Protocol (11/4/2024 11:48 AM)    Medication indicated for associated diagnosis    Medication is associated with one or more of the following diagnoses:                Chronic Kidney Disease (CKD)              Coronary Artery Disease (CAD)              Diabetes              Heart Failure (HF)              Hypertension (HTN)              Nephropathy              History of myocarditis              Tachycardia induced cardiomyopathy              STEMI (ST elevation myocardial infarction)              Spontaneous dissection of coronary artery              Status post percutaneous transluminal coronary angioplasty

## 2025-02-12 ENCOUNTER — OFFICE VISIT (OUTPATIENT)
Dept: FAMILY MEDICINE | Facility: OTHER | Age: 80
End: 2025-02-12
Attending: STUDENT IN AN ORGANIZED HEALTH CARE EDUCATION/TRAINING PROGRAM
Payer: MEDICARE

## 2025-02-12 VITALS
TEMPERATURE: 97 F | HEIGHT: 63 IN | WEIGHT: 155.7 LBS | OXYGEN SATURATION: 96 % | DIASTOLIC BLOOD PRESSURE: 62 MMHG | BODY MASS INDEX: 27.59 KG/M2 | HEART RATE: 97 BPM | SYSTOLIC BLOOD PRESSURE: 112 MMHG | RESPIRATION RATE: 16 BRPM

## 2025-02-12 DIAGNOSIS — M10.9 GOUT OF MULTIPLE SITES, UNSPECIFIED CAUSE, UNSPECIFIED CHRONICITY: ICD-10-CM

## 2025-02-12 DIAGNOSIS — N18.31 STAGE 3A CHRONIC KIDNEY DISEASE (H): ICD-10-CM

## 2025-02-12 DIAGNOSIS — K61.1 PERIRECTAL ABSCESS: ICD-10-CM

## 2025-02-12 DIAGNOSIS — Z96.651 STATUS POST TOTAL RIGHT KNEE REPLACEMENT: ICD-10-CM

## 2025-02-12 DIAGNOSIS — Z76.89 ENCOUNTER TO ESTABLISH CARE: Primary | ICD-10-CM

## 2025-02-12 DIAGNOSIS — J01.90 ACUTE SINUSITIS, RECURRENCE NOT SPECIFIED, UNSPECIFIED LOCATION: ICD-10-CM

## 2025-02-12 PROCEDURE — G0463 HOSPITAL OUTPT CLINIC VISIT: HCPCS

## 2025-02-12 RX ORDER — COLCHICINE 0.6 MG/1
0.6 TABLET ORAL DAILY
Qty: 30 TABLET | Refills: 2 | Status: SHIPPED | OUTPATIENT
Start: 2025-02-12

## 2025-02-12 RX ORDER — COLCHICINE 0.6 MG/1
0.6 TABLET ORAL DAILY
COMMUNITY
End: 2025-02-12

## 2025-02-12 ASSESSMENT — PAIN SCALES - GENERAL: PAINLEVEL_OUTOF10: NO PAIN (0)

## 2025-02-12 NOTE — PROGRESS NOTES
{PROVIDER CHARTING PREFERENCE:486228}    Ladi Agarwal is a 79 year old, presenting for the following health issues:  Establish Care and Chronic Disease Management (CKD, Lipids, Heart Failure)      2/12/2025    10:50 AM   Additional Questions   Roomed by Luz johnson   Accompanied by self         2/12/2025    10:50 AM   Patient Reported Additional Medications   Patient reports taking the following new medications none     HPI     Moved in 2023 from [place].  Lived with daughter in [place].  Came here for the grandchildren. Has one daughter.    Had knee replacement about 3 years ago.  Knee replacement turned out good.  Pain on the inside of her knee.  Walking is terrible she tells me  Cannot do a lot of walking, gets tired a lot.    Did PT after knee replaced and this didn't help.    XR done- everything looks normal.    Cannot stand up straight.  Rides the stationary bike.  Has tried different creams, nothing works.      Runny nose for 3 days.  Has been taking sudafed.       Hyperlipidemia Follow-Up    Are you regularly taking any medication or supplement to lower your cholesterol?   No  Are you having muscle aches or other side effects that you think could be caused by your cholesterol lowering medication?  N/A    Heart Failure Follow-up {Provider  Link to Heart Failure SmartSet :094562}  Are you experiencing any shortness of breath? No  Are you experiencing any swelling in your legs or feet?  No  Are you using more pillows than usual? No  Do you cough at night?  No  Do you check your weight daily?  Yes  Have you had a weight change recently?  Weight decrease   Are you having any of the following side effects from your medications? (Select all that apply)  The patient does not report symptoms of dizziness, fatigue, cough, swelling, or slow heart beat.  Since your last visit, how many times have you gone to the cardiologist, urgent care, emergency room, or hospital because of your heart failure?    "None    Last Echo:   Echo result w/o MOPS: Interpretation SummaryLeft ventricular function is decreased. The ejection fraction is 45-50%(mildly reduced).Moderate to severe left ventricular dilation is present.Global right ventricular function is normal.Severe left atrial enlargement is present.Trace to mild mitral insufficiency is present.Aortic valve is normal in structure and function.Trace tricuspid insufficiency is present.Grade I or early diastolic dysfunction.    Chronic Kidney Disease Follow-up  {Provider  Link to CKD SmartSet :591669}  Do you take any over the counter pain medicine?: Yes  What over the counter medicine are you taking for your pain?:  Tylenol    How often do you take this medicine?:   Rarely uses, only when in extreme pain    {additonal problems for provider to add (Optional):322398}    {ROS Picklists (Optional):990731}      Objective    /62 (BP Location: Right arm, Patient Position: Sitting, Cuff Size: Adult Regular)   Pulse 97   Temp 97  F (36.1  C) (Tympanic)   Resp 16   Ht 1.608 m (5' 3.3\")   Wt 70.6 kg (155 lb 11.2 oz)   SpO2 96%   BMI 27.32 kg/m    Body mass index is 27.32 kg/m .  Physical Exam   {Exam List (Optional):611817}    {Diagnostic Test Results (Optional):724220}        Signed Electronically by: Casandra Felix MD  {Email feedback regarding this note to [email or web address]   :119287}  " 11.2 oz)   SpO2 96%   BMI 27.32 kg/m    Body mass index is 27.32 kg/m .  Physical Exam   GENERAL: alert and no distress  EYES: Eyes grossly normal to inspection, PERRL and conjunctivae and sclerae normal  HENT: ear canals and TM's normal, nose and mouth without ulcers or lesions  NECK: no adenopathy, no asymmetry, masses, or scars  RESP: lungs clear to auscultation - no rales, rhonchi or wheezes  CV: regular rate and rhythm, normal S1 S2, no S3 or S4, no murmur, click or rub, no peripheral edema  ABDOMEN: soft, nontender, no hepatosplenomegaly, no masses and bowel sounds normal  MS: no gross musculoskeletal defects noted, no edema  SKIN: no suspicious lesions or rashes  NEURO: Normal strength and tone, mentation intact and speech normal  PSYCH: mentation appears normal, affect normal/bright          Signed Electronically by: Casandra Felix MD

## 2025-03-18 DIAGNOSIS — K59.00 CONSTIPATION, UNSPECIFIED CONSTIPATION TYPE: ICD-10-CM

## 2025-03-18 RX ORDER — SENNOSIDES 8.6 MG
2 TABLET ORAL DAILY
Qty: 70 TABLET | Refills: 0 | Status: SHIPPED | OUTPATIENT
Start: 2025-03-18

## 2025-03-18 NOTE — TELEPHONE ENCOUNTER
sennosides (SENOKOT) 8.6 MG table       Last Written Prescription Date:  11/7/24  Last Fill Quantity: 70,   # refills: 0  Last Office Visit: 2/12/25  Future Office visit:       Routing refill request to provider for review/approval because:

## 2025-03-24 DIAGNOSIS — R41.89 COGNITIVE AND BEHAVIORAL CHANGES: ICD-10-CM

## 2025-03-24 DIAGNOSIS — R46.89 COGNITIVE AND BEHAVIORAL CHANGES: ICD-10-CM

## 2025-03-24 DIAGNOSIS — I70.0 ATHEROSCLEROSIS OF AORTA: Chronic | ICD-10-CM

## 2025-03-24 RX ORDER — ASPIRIN 81 MG/1
81 TABLET ORAL DAILY
Qty: 35 TABLET | Refills: 3 | Status: SHIPPED | OUTPATIENT
Start: 2025-03-24

## 2025-03-24 RX ORDER — FUROSEMIDE 20 MG/1
20 TABLET ORAL DAILY
Qty: 28 TABLET | Refills: 11 | Status: SHIPPED | OUTPATIENT
Start: 2025-03-24

## 2025-03-24 RX ORDER — CARVEDILOL 6.25 MG/1
3.12 TABLET ORAL 2 TIMES DAILY WITH MEALS
Qty: 28 TABLET | Refills: 11 | Status: SHIPPED | OUTPATIENT
Start: 2025-03-24

## 2025-03-24 NOTE — TELEPHONE ENCOUNTER
Aspirin      Last Written Prescription Date:  11/7/24  Last Fill Quantity: 35,   # refills: 3  Last Office Visit: 2/12/25  Future Office visit:    Next 5 appointments (look out 90 days)      Jun 17, 2025 11:00 AM  (Arrive by 10:45 AM)  Adult Preventative Visit with Casandra Felix MD  [facility] [place] ([facility] [place] ) [address]  [place] [address]  [phone]             Routing refill request to provider for review/approval because:      Lasix      Last Written Prescription Date:  6/17/24  Last Fill Quantity: 28,   # refills: 11  Last Office Visit: 2/12/25  Future Office visit:    Next 5 appointments (look out 90 days)      Jun 17, 2025 11:00 AM  (Arrive by 10:45 AM)  Adult Preventative Visit with Casandra Felix MD  [facility] [place] ([facility] [place] ) [address]  [place] [address]  [phone]             Routing refill request to provider for review/approval because:      Carvedilol      Last Written Prescription Date:  6/17/24  Last Fill Quantity: 28,   # refills: 11  Last Office Visit: 2/12/25  Future Office visit:    Next 5 appointments (look out 90 days)      Jun 17, 2025 11:00 AM  (Arrive by 10:45 AM)  Adult Preventative Visit with Casandra Felix MD  [facility] [place] ([facility] [place] ) [address]  [place] [address]  [phone]             Routing refill request to provider for review/approval because:

## 2025-04-17 DIAGNOSIS — K59.00 CONSTIPATION, UNSPECIFIED CONSTIPATION TYPE: ICD-10-CM

## 2025-04-17 RX ORDER — SENNOSIDES 8.6 MG
2 TABLET ORAL
Qty: 70 TABLET | Refills: 0 | Status: SHIPPED | OUTPATIENT
Start: 2025-04-17

## 2025-04-17 NOTE — TELEPHONE ENCOUNTER
sennosides (SENOKOT) 8.6 MG       Last Written Prescription Date:  03/18/2025  Last Fill Quantity: 70,   # refills: 0  Last Office Visit: 02/12/2025  Future Office visit:    Next 5 appointments (look out 90 days)      Jun 17, 2025 11:00 AM  (Arrive by 10:45 AM)  Adult Preventative Visit with Casandra Felix MD  [facility] [place] ([facility] [place] ) [address]  [place] [address]  [phone]             Routing refill request to provider for review/approval because:  Drug not on the [facility], [facility] or [facility] refill protocol or controlled substance

## 2025-05-22 ENCOUNTER — TELEPHONE (OUTPATIENT)
Dept: SURGERY | Facility: CLINIC | Age: 80
End: 2025-05-22
Payer: COMMERCIAL

## 2025-05-22 NOTE — TELEPHONE ENCOUNTER
[facility] Call Center    Phone Message    May a detailed message be left on voicemail: yes     Reason for Call: Other: Patient is calling and is asking if there is a Monday she can come in to be retied due to after her anal abscess. Please call the patient back to reschedule this as Dr. Zavala only has appointments on Thursdays. Thanks!      Action Taken: Message routed to:  [facility] CRS    Travel Screening: Not Applicable     Date of Service:

## 2025-05-22 NOTE — TELEPHONE ENCOUNTER
Due for seton exchange this summer. Patient requesting to schedule August 4th. Scheduled veronica Alegria NP.

## 2025-06-17 ENCOUNTER — RESULTS FOLLOW-UP (OUTPATIENT)
Dept: FAMILY MEDICINE | Facility: OTHER | Age: 80
End: 2025-06-17

## 2025-06-17 ENCOUNTER — OFFICE VISIT (OUTPATIENT)
Dept: FAMILY MEDICINE | Facility: OTHER | Age: 80
End: 2025-06-17
Attending: STUDENT IN AN ORGANIZED HEALTH CARE EDUCATION/TRAINING PROGRAM
Payer: MEDICARE

## 2025-06-17 VITALS
TEMPERATURE: 98 F | BODY MASS INDEX: 27.52 KG/M2 | OXYGEN SATURATION: 96 % | WEIGHT: 155.3 LBS | HEIGHT: 63 IN | SYSTOLIC BLOOD PRESSURE: 104 MMHG | HEART RATE: 88 BPM | RESPIRATION RATE: 20 BRPM | DIASTOLIC BLOOD PRESSURE: 58 MMHG

## 2025-06-17 DIAGNOSIS — M10.9 GOUT OF MULTIPLE SITES, UNSPECIFIED CAUSE, UNSPECIFIED CHRONICITY: ICD-10-CM

## 2025-06-17 DIAGNOSIS — Z00.00 ENCOUNTER FOR MEDICARE ANNUAL WELLNESS EXAM: Primary | ICD-10-CM

## 2025-06-17 DIAGNOSIS — I70.0 ATHEROSCLEROSIS OF AORTA: ICD-10-CM

## 2025-06-17 DIAGNOSIS — Z78.0 ASYMPTOMATIC MENOPAUSAL STATE: ICD-10-CM

## 2025-06-17 DIAGNOSIS — Z12.31 ENCOUNTER FOR SCREENING MAMMOGRAM FOR MALIGNANT NEOPLASM OF BREAST: ICD-10-CM

## 2025-06-17 DIAGNOSIS — I10 ESSENTIAL HYPERTENSION: ICD-10-CM

## 2025-06-17 DIAGNOSIS — G47.33 OBSTRUCTIVE SLEEP APNEA: Chronic | ICD-10-CM

## 2025-06-17 DIAGNOSIS — M85.80 OSTEOPENIA, UNSPECIFIED LOCATION: ICD-10-CM

## 2025-06-17 DIAGNOSIS — I50.22 CHRONIC SYSTOLIC CONGESTIVE HEART FAILURE (H): ICD-10-CM

## 2025-06-17 DIAGNOSIS — N18.31 STAGE 3A CHRONIC KIDNEY DISEASE (H): ICD-10-CM

## 2025-06-17 LAB
ALBUMIN SERPL BCG-MCNC: 4.1 G/DL (ref 3.5–5.2)
ALP SERPL-CCNC: 106 U/L (ref 40–150)
ALT SERPL W P-5'-P-CCNC: 8 U/L (ref 0–50)
ANION GAP SERPL CALCULATED.3IONS-SCNC: 12 MMOL/L (ref 7–15)
AST SERPL W P-5'-P-CCNC: 17 U/L (ref 0–45)
BASOPHILS # BLD AUTO: 0.1 10E3/UL (ref 0–0.2)
BASOPHILS NFR BLD AUTO: 1 %
BILIRUB SERPL-MCNC: 0.9 MG/DL
BUN SERPL-MCNC: 31.4 MG/DL (ref 8–23)
CALCIUM SERPL-MCNC: 9.4 MG/DL (ref 8.8–10.4)
CHLORIDE SERPL-SCNC: 105 MMOL/L (ref 98–107)
CHOLEST SERPL-MCNC: 248 MG/DL
CREAT SERPL-MCNC: 1.26 MG/DL (ref 0.51–0.95)
CREAT UR-MCNC: 150.8 MG/DL
EGFRCR SERPLBLD CKD-EPI 2021: 44 ML/MIN/1.73M2
EOSINOPHIL # BLD AUTO: 0.2 10E3/UL (ref 0–0.7)
EOSINOPHIL NFR BLD AUTO: 3 %
ERYTHROCYTE [DISTWIDTH] IN BLOOD BY AUTOMATED COUNT: 12.2 % (ref 10–15)
EST. AVERAGE GLUCOSE BLD GHB EST-MCNC: 100 MG/DL
FASTING STATUS PATIENT QL REPORTED: YES
FASTING STATUS PATIENT QL REPORTED: YES
GLUCOSE SERPL-MCNC: 92 MG/DL (ref 70–99)
HBA1C MFR BLD: 5.1 %
HCO3 SERPL-SCNC: 25 MMOL/L (ref 22–29)
HCT VFR BLD AUTO: 36.4 % (ref 35–47)
HDLC SERPL-MCNC: 58 MG/DL
HGB BLD-MCNC: 12.1 G/DL (ref 11.7–15.7)
IMM GRANULOCYTES # BLD: 0 10E3/UL
IMM GRANULOCYTES NFR BLD: 0 %
LDLC SERPL CALC-MCNC: 161 MG/DL
LYMPHOCYTES # BLD AUTO: 2.5 10E3/UL (ref 0.8–5.3)
LYMPHOCYTES NFR BLD AUTO: 28 %
MCH RBC QN AUTO: 32.8 PG (ref 26.5–33)
MCHC RBC AUTO-ENTMCNC: 33.2 G/DL (ref 31.5–36.5)
MCV RBC AUTO: 99 FL (ref 78–100)
MICROALBUMIN UR-MCNC: <12 MG/L
MICROALBUMIN/CREAT UR: NORMAL MG/G{CREAT}
MONOCYTES # BLD AUTO: 0.6 10E3/UL (ref 0–1.3)
MONOCYTES NFR BLD AUTO: 7 %
NEUTROPHILS # BLD AUTO: 5.3 10E3/UL (ref 1.6–8.3)
NEUTROPHILS NFR BLD AUTO: 61 %
NONHDLC SERPL-MCNC: 190 MG/DL
NRBC # BLD AUTO: 0 10E3/UL
NRBC BLD AUTO-RTO: 0 /100
PLATELET # BLD AUTO: 346 10E3/UL (ref 150–450)
POTASSIUM SERPL-SCNC: 4.5 MMOL/L (ref 3.4–5.3)
PROT SERPL-MCNC: 7.5 G/DL (ref 6.4–8.3)
RBC # BLD AUTO: 3.69 10E6/UL (ref 3.8–5.2)
SODIUM SERPL-SCNC: 142 MMOL/L (ref 135–145)
TRIGL SERPL-MCNC: 146 MG/DL
TSH SERPL DL<=0.005 MIU/L-ACNC: 2.74 UIU/ML (ref 0.3–4.2)
WBC # BLD AUTO: 8.8 10E3/UL (ref 4–11)

## 2025-06-17 PROCEDURE — 36415 COLL VENOUS BLD VENIPUNCTURE: CPT | Mod: ZL | Performed by: STUDENT IN AN ORGANIZED HEALTH CARE EDUCATION/TRAINING PROGRAM

## 2025-06-17 PROCEDURE — 80061 LIPID PANEL: CPT | Mod: ZL | Performed by: STUDENT IN AN ORGANIZED HEALTH CARE EDUCATION/TRAINING PROGRAM

## 2025-06-17 PROCEDURE — 85004 AUTOMATED DIFF WBC COUNT: CPT | Mod: ZL | Performed by: STUDENT IN AN ORGANIZED HEALTH CARE EDUCATION/TRAINING PROGRAM

## 2025-06-17 PROCEDURE — 82043 UR ALBUMIN QUANTITATIVE: CPT | Mod: ZL | Performed by: STUDENT IN AN ORGANIZED HEALTH CARE EDUCATION/TRAINING PROGRAM

## 2025-06-17 PROCEDURE — 83036 HEMOGLOBIN GLYCOSYLATED A1C: CPT | Mod: ZL | Performed by: STUDENT IN AN ORGANIZED HEALTH CARE EDUCATION/TRAINING PROGRAM

## 2025-06-17 PROCEDURE — 84443 ASSAY THYROID STIM HORMONE: CPT | Mod: ZL | Performed by: STUDENT IN AN ORGANIZED HEALTH CARE EDUCATION/TRAINING PROGRAM

## 2025-06-17 PROCEDURE — 80053 COMPREHEN METABOLIC PANEL: CPT | Mod: ZL | Performed by: STUDENT IN AN ORGANIZED HEALTH CARE EDUCATION/TRAINING PROGRAM

## 2025-06-17 SDOH — HEALTH STABILITY: PHYSICAL HEALTH: ON AVERAGE, HOW MANY DAYS PER WEEK DO YOU ENGAGE IN MODERATE TO STRENUOUS EXERCISE (LIKE A BRISK WALK)?: 6 DAYS

## 2025-06-17 ASSESSMENT — PAIN SCALES - GENERAL: PAINLEVEL_OUTOF10: NO PAIN (0)

## 2025-06-17 ASSESSMENT — SOCIAL DETERMINANTS OF HEALTH (SDOH): HOW OFTEN DO YOU GET TOGETHER WITH FRIENDS OR RELATIVES?: ONCE A WEEK

## 2025-06-17 NOTE — PATIENT INSTRUCTIONS
Patient Education   Preventive Care Advice   This is general advice given by our system to help you stay healthy. However, your care team may have specific advice just for you. Please talk to your care team about your preventive care needs.  Nutrition  Eat 5 or more servings of fruits and vegetables each day.  Try wheat bread, brown rice and whole grain pasta (instead of white bread, rice, and pasta).  Get enough calcium and vitamin D. Check the label on foods and aim for 100% of the RDA (recommended daily allowance).  Lifestyle  Exercise at least 150 minutes each week  (30 minutes a day, 5 days a week).  Do muscle strengthening activities 2 days a week. These help control your weight and prevent disease.  No smoking.  Wear sunscreen to prevent skin cancer.  Have a dental exam and cleaning every 6 months.  Yearly exams  See your health care team every year to talk about:  Any changes in your health.  Any medicines your care team has prescribed.  Preventive care, family planning, and ways to prevent chronic diseases.  Shots (vaccines)   HPV shots (up to age 26), if you've never had them before.  Hepatitis B shots (up to age 59), if you've never had them before.  COVID-19 shot: Get this shot when it's due.  Flu shot: Get a flu shot every year.  Tetanus shot: Get a tetanus shot every 10 years.  Pneumococcal, hepatitis A, and RSV shots: Ask your care team if you need these based on your risk.  Shingles shot (for age 50 and up)  General health tests  Diabetes screening:  Starting at age 35, Get screened for diabetes at least every 3 years.  If you are younger than age 35, ask your care team if you should be screened for diabetes.  Cholesterol test: At age 39, start having a cholesterol test every 5 years, or more often if advised.  Bone density scan (DEXA): At age 50, ask your care team if you should have this scan for osteoporosis (brittle bones).  Hepatitis C: Get tested at least once in your life.  STIs (sexually  transmitted infections)  Before age 24: Ask your care team if you should be screened for STIs.  After age 24: Get screened for STIs if you're at risk. You are at risk for STIs (including HIV) if:  You are sexually active with more than one person.  You don't use condoms every time.  You or a partner was diagnosed with a sexually transmitted infection.  If you are at risk for HIV, ask about PrEP medicine to prevent HIV.  Get tested for HIV at least once in your life, whether you are at risk for HIV or not.  Cancer screening tests  Cervical cancer screening: If you have a cervix, begin getting regular cervical cancer screening tests starting at age 21.  Breast cancer scan (mammogram): If you've ever had breasts, begin having regular mammograms starting at age 40. This is a scan to check for breast cancer.  Colon cancer screening: It is important to start screening for colon cancer at age 45.  Have a colonoscopy test every 10 years (or more often if you're at risk) Or, ask your provider about stool tests like a FIT test every year or Cologuard test every 3 years.  To learn more about your testing options, visit:   .  For help making a decision, visit:   https://bit.ly/si30356.  Prostate cancer screening test: If you have a prostate, ask your care team if a prostate cancer screening test (PSA) at age 55 is right for you.  Lung cancer screening: If you are a current or former smoker ages 50 to 80, ask your care team if ongoing lung cancer screenings are right for you.  For informational purposes only. Not to replace the advice of your health care provider. Copyright   2023 [employer]. All rights reserved. Clinically reviewed by the [facility]. [employer] [id number] - REV 01/24.

## 2025-06-17 NOTE — PROGRESS NOTES
"Preventive Care Visit  [facility]  Casandra Felix MD, Family Medicine  Jun 17, 2025      Assessment & Plan     Encounter for Medicare annual wellness exam  Any presents for her annual physical   She is due for fasting labs  She is due for mammogram as well  - CBC with platelets and differential; Future  - Comprehensive metabolic panel; Future  - TSH with free T4 reflex; Future  - Hemoglobin A1c; Future  - Lipid Profile (Chol, Trig, HDL, LDL calc); Future  - MA Screen Bilateral w/Buddy; Future    Gout of multiple sites, unspecified cause, unspecified chronicity  No recent gout flares.  She is not on any prophylactic medication at this time    Stage 3a chronic kidney disease (H)  Clinically stable  Caution with NSAIDs    Atherosclerosis of aorta  Clinically stable    Obstructive sleep apnea  On CPAP    Chronic systolic congestive heart failure (H)  Clinically stable.  Denies any exertional symptoms    Osteopenia, unspecified location  Due for repeat. Last DEXA done 2021  - DX Bone Density; Future    Encounter for screening mammogram for malignant neoplasm of breast  Due  - MA Screen Bilateral w/Buddy; Future    Essential hypertension  BP stable  - Albumin Random Urine Quantitative with Creat Ratio; Future    Asymptomatic menopausal state  Due  - DX Bone Density; Future          BMI  Estimated body mass index is 27.25 kg/m  as calculated from the following:    Height as of this encounter: 1.608 m (5' 3.3\").    Weight as of this encounter: 70.4 kg (155 lb 4.8 oz).       Counseling  Appropriate preventive services were addressed with this patient via screening, questionnaire, or discussion as appropriate for fall prevention, nutrition, physical activity, Tobacco-use cessation, social engagement, weight loss and cognition.  Checklist reviewing preventive services available has been given to the patient.  Reviewed patient's diet, addressing concerns and/or questions.         Subjective   Any is a 79 year " old, presenting for the following:  Physical, Kidney Problem, Sleep Apnea, and Heart Failure        6/17/2025    11:26 AM   Additional Questions   Roomed by Kelly RASMUSSEN   Accompanied by self         6/17/2025    11:26 AM   Patient Reported Additional Medications   Patient reports taking the following new medications none             Kidney Problem         Heart Failure Follow-up   Are you experiencing any shortness of breath? No  Are you experiencing any swelling in your legs or feet?  Stable  Are you using more pillows than usual? No  Do you cough at night?  No  Do you check your weight daily?  Yes  Have you had a weight change recently?  No  Are you having any of the following side effects from your medications? (Select all that apply)  The patient does not report symptoms of dizziness, fatigue, cough, swelling, or slow heart beat.  Since your last visit, how many times have you gone to the cardiologist, urgent care, emergency room, or hospital because of your heart failure?   None  Last Echo:   Echo result w/o MOPS: Interpretation SummaryLeft ventricular function is decreased. The ejection fraction is 45-50%(mildly reduced).Moderate to severe left ventricular dilation is present.Global right ventricular function is normal.Severe left atrial enlargement is present.Trace to mild mitral insufficiency is present.Aortic valve is normal in structure and function.Trace tricuspid insufficiency is present.Grade I or early diastolic dysfunction.      Chronic Kidney Disease Follow-up    Do you take any over the counter pain medicine?: No    Advance Care Planning    Document on file is a Health Care Directive or POLST.        6/17/2025   General Health   How would you rate your overall physical health? Good   Feel stress (tense, anxious, or unable to sleep) Only a little   (!) STRESS CONCERN      6/17/2025   Nutrition   Diet: Other   If other, please elaborate: no         6/17/2025   Exercise   Days per week of  moderate/strenous exercise 6 days         6/17/2025   Social Factors   Frequency of gathering with friends or relatives Once a week   Worry food won't last until get money to buy more No   Food not last or not have enough money for food? No   Do you have housing? (Housing is defined as stable permanent housing and does not include staying outside in a car, in a tent, in an abandoned building, in an overnight shelter, or couch-surfing.) Yes   Are you worried about losing your housing? No   Lack of transportation? No   Unable to get utilities (heat,electricity)? No         6/17/2025   Fall Risk   Fallen 2 or more times in the past year? No   Trouble with walking or balance? Yes         6/17/2025   Activities of Daily Living- Home Safety   Needs help with the following daily activites None of the above   Safety concerns in the home None of the above         6/17/2025   Dental   Dentist two times every year? Yes         6/17/2025   Hearing Screening   Hearing concerns? None of the above         6/17/2025   Driving Risk Screening   Patient/family members have concerns about driving No         6/17/2025   General Alertness/Fatigue Screening   Have you been more tired than usual lately? No         6/17/2025   Urinary Incontinence Screening   Bothered by leaking urine in past 6 months No         Today's PHQ-2 Score:       6/17/2025    11:20 AM   PHQ-2 ( 1999 [employer])   Q1: Little interest or pleasure in doing things 0   Q2: Feeling down, depressed or hopeless 0   PHQ-2 Score 0    Q1: Little interest or pleasure in doing things Not at all   Q2: Feeling down, depressed or hopeless Not at all   PHQ-2 Score 0       Patient-reported           6/17/2025   Substance Use   Alcohol more than 3/day or more than 7/wk No   Do you have a current opioid prescription? No   How severe/bad is pain from 1 to 10? 1/10   Do you use any other substances recreationally? No     Social History     Tobacco Use    Smoking status: Never     Passive  exposure: Never    Smokeless tobacco: Never   Vaping Use    Vaping status: Never Used         10/11/2023   LAST FHS-7 RESULTS   1st degree relative breast or ovarian cancer No   Any relative bilateral breast cancer No   Any male have breast cancer No   Any ONE woman have BOTH breast AND ovarian cancer No   Any woman with breast cancer before 50yrs No   2 or more relatives with breast AND/OR bowel cancer No     Mammogram Screening - Mammogram every 1-2 years updated in Health Maintenance based on mutual decision making    ASCVD Risk   The ASCVD Risk score (Lili BEAN, et al., 2019) failed to calculate for the following reasons:    Cannot find a previous HDL lab    Cannot find a previous total cholesterol lab      Reviewed and updated as needed this visit by Provider                    Past Medical History:   Diagnosis Date    Refusal of blood transfusions as patient is [employer]      Past Surgical History:   Procedure Laterality Date    COLONOSCOPY      CV CORONARY ANGIOGRAM      NO stent needed    EXAM UNDER ANESTHESIA RECTUM N/A 2024    Procedure: EXAM UNDER ANESTHESIA, RECTUM;  Surgeon: Stu Francisco MD;  Location: [facility] OR    EYE MUSCLE SURGERY      INCISION AND DRAINAGE BUTTOCKS Left 10/14/2023    Procedure: Incision And Drainage Left Perirectal Abcess;  Surgeon: Brian Owusu MD;  Location: [facility] OR    PLACEMENT OF SETON RECTUM N/A 2024    Procedure: PLACEMENT, SETON STITCH X 2, EXAMINATION UNDER ANESETHESIA;  Surgeon: Javier Zavala MD;  Location: [facility] OR    TOTAL KNEE ARTHROPLASTY Right     TUBAL LIGATION       OB History    Para Term  AB Living   1 1 1 0 0 0   SAB IAB Ectopic Multiple Live Births   0 0 0 0 0      # Outcome Date GA Lbr Erick/2nd Weight Sex Type Anes PTL Lv   1 Term              Current providers sharing in care for this patient include:  Patient Care Team:  Casandra Felix MD as PCP - General (Family Medicine)  Nikita Hutchinson MD as  "Assigned PCP  Claudia Hernandez NP as Assigned Surgical Provider    The following health maintenance items are reviewed in Epic and correct as of today:  Health Maintenance   Topic Date Due    DEXA  Never done    LIPID  Never done    HEPATITIS C SCREENING  Never done    MICROALBUMIN  02/14/2023    BMP  12/17/2024    COVID-19 VACCINE (6 - 2024-25 season) 04/30/2025    ALT  06/17/2025    CBC  06/17/2025    HEMOGLOBIN  06/17/2025    MEDICARE ANNUAL WELLNESS VISIT  06/17/2026    FALL RISK ASSESSMENT  06/17/2026    HF ACTION PLAN  04/17/2027    DIABETES SCREENING  06/17/2027    ADVANCE CARE PLANNING  05/16/2029    DTAP/TDAP/TD VACCINE (3 - Td or Tdap) 08/28/2033    TSH W/FREE T4 REFLEX  Completed    PHQ-2 (once per calendar year)  Completed    INFLUENZA VACCINE  Completed    PNEUMOCOCCAL VACCINE 50+ YEARS  Completed    URINALYSIS  Completed    ZOSTER VACCINE  Completed    RSV VACCINE  Completed    HPV VACCINE  Aged Out    MENINGITIS VACCINE  Aged Out    MAMMO SCREENING  Discontinued         Review of Systems  Constitutional, HEENT, cardiovascular, pulmonary, GI, , musculoskeletal, neuro, skin, endocrine and psych systems are negative, except as otherwise noted.     Objective    Exam  /58 (BP Location: Left arm, Patient Position: Sitting, Cuff Size: Adult Regular)   Pulse 88   Temp 98  F (36.7  C) (Tympanic)   Resp 20   Ht 1.608 m (5' 3.3\")   Wt 70.4 kg (155 lb 4.8 oz)   SpO2 96%   BMI 27.25 kg/m     Estimated body mass index is 27.25 kg/m  as calculated from the following:    Height as of this encounter: 1.608 m (5' 3.3\").    Weight as of this encounter: 70.4 kg (155 lb 4.8 oz).    Physical Exam  GENERAL: alert and no distress  EYES: Eyes grossly normal to inspection, PERRL and conjunctivae and sclerae normal  HENT: ear canals and TM's normal, nose and mouth without ulcers or lesions  NECK: no adenopathy, no asymmetry, masses, or scars  RESP: lungs clear to auscultation - no rales, rhonchi or " wheezes  BREAST: normal without masses, tenderness or nipple discharge and no palpable axillary masses or adenopathy  CV: regular rate and rhythm, normal S1 S2, no S3 or S4, no murmur, click or rub, no peripheral edema  ABDOMEN: soft, nontender, no hepatosplenomegaly, no masses and bowel sounds normal  MS: no gross musculoskeletal defects noted, no edema  SKIN: no suspicious lesions or rashes  NEURO: Normal strength and tone, mentation intact and speech normal  PSYCH: mentation appears normal, affect normal/bright         6/17/2025   Mini Cog   Clock Draw Score 0 Abnormal   3 Item Recall 2 objects recalled   Mini Cog Total Score 2             Signed Electronically by: Casandra Felix MD

## 2025-06-18 DIAGNOSIS — E78.5 HYPERLIPIDEMIA LDL GOAL <100: Primary | ICD-10-CM

## 2025-06-18 RX ORDER — ROSUVASTATIN CALCIUM 5 MG/1
5 TABLET, COATED ORAL DAILY
Qty: 30 TABLET | Refills: 1 | Status: SHIPPED | OUTPATIENT
Start: 2025-06-18

## 2025-08-04 ENCOUNTER — OFFICE VISIT (OUTPATIENT)
Dept: SURGERY | Facility: CLINIC | Age: 80
End: 2025-08-04
Payer: COMMERCIAL

## 2025-08-04 VITALS
OXYGEN SATURATION: 99 % | SYSTOLIC BLOOD PRESSURE: 130 MMHG | HEIGHT: 63 IN | DIASTOLIC BLOOD PRESSURE: 63 MMHG | BODY MASS INDEX: 27.46 KG/M2 | WEIGHT: 155 LBS | HEART RATE: 73 BPM

## 2025-08-04 DIAGNOSIS — K60.30 ANAL FISTULA: Primary | ICD-10-CM

## 2025-08-04 PROCEDURE — 1126F AMNT PAIN NOTED NONE PRSNT: CPT | Performed by: NURSE PRACTITIONER

## 2025-08-04 PROCEDURE — 46020 PLACEMENT OF SETON: CPT | Performed by: NURSE PRACTITIONER

## 2025-08-04 PROCEDURE — 3075F SYST BP GE 130 - 139MM HG: CPT | Performed by: NURSE PRACTITIONER

## 2025-08-04 PROCEDURE — 99213 OFFICE O/P EST LOW 20 MIN: CPT | Mod: 25 | Performed by: NURSE PRACTITIONER

## 2025-08-04 PROCEDURE — 3078F DIAST BP <80 MM HG: CPT | Performed by: NURSE PRACTITIONER

## 2025-08-04 ASSESSMENT — PAIN SCALES - GENERAL: PAINLEVEL_OUTOF10: NO PAIN (0)

## 2025-08-06 ENCOUNTER — HOSPITAL ENCOUNTER (OUTPATIENT)
Dept: BONE DENSITY | Facility: HOSPITAL | Age: 80
Discharge: HOME | End: 2025-08-06
Attending: STUDENT IN AN ORGANIZED HEALTH CARE EDUCATION/TRAINING PROGRAM
Payer: MEDICARE

## 2025-08-06 ENCOUNTER — ANCILLARY PROCEDURE (OUTPATIENT)
Dept: MAMMOGRAPHY | Facility: OTHER | Age: 80
End: 2025-08-06
Attending: STUDENT IN AN ORGANIZED HEALTH CARE EDUCATION/TRAINING PROGRAM
Payer: MEDICARE

## 2025-08-06 DIAGNOSIS — M85.80 OSTEOPENIA, UNSPECIFIED LOCATION: ICD-10-CM

## 2025-08-06 DIAGNOSIS — Z12.31 ENCOUNTER FOR SCREENING MAMMOGRAM FOR MALIGNANT NEOPLASM OF BREAST: ICD-10-CM

## 2025-08-06 DIAGNOSIS — Z78.0 ASYMPTOMATIC MENOPAUSAL STATE: ICD-10-CM

## 2025-08-06 DIAGNOSIS — Z00.00 ENCOUNTER FOR MEDICARE ANNUAL WELLNESS EXAM: ICD-10-CM

## 2025-08-06 PROCEDURE — 77067 SCR MAMMO BI INCL CAD: CPT | Mod: 26 | Performed by: RADIOLOGY

## 2025-08-06 PROCEDURE — 77080 DXA BONE DENSITY AXIAL: CPT

## 2025-08-06 PROCEDURE — 77063 BREAST TOMOSYNTHESIS BI: CPT | Mod: TC

## 2025-08-06 PROCEDURE — 77063 BREAST TOMOSYNTHESIS BI: CPT | Mod: 26 | Performed by: RADIOLOGY

## 2025-08-11 ENCOUNTER — TELEPHONE (OUTPATIENT)
Dept: SURGERY | Facility: CLINIC | Age: 80
End: 2025-08-11
Payer: COMMERCIAL

## 2025-08-11 DIAGNOSIS — K59.00 CONSTIPATION, UNSPECIFIED CONSTIPATION TYPE: ICD-10-CM

## 2025-08-11 DIAGNOSIS — I70.0 ATHEROSCLEROSIS OF AORTA: Chronic | ICD-10-CM

## 2025-08-11 DIAGNOSIS — E78.5 HYPERLIPIDEMIA LDL GOAL <100: ICD-10-CM

## 2025-08-11 DIAGNOSIS — K60.30 ANAL FISTULA: Primary | ICD-10-CM

## 2025-08-14 RX ORDER — ROSUVASTATIN CALCIUM 5 MG/1
5 TABLET, COATED ORAL DAILY
Qty: 30 TABLET | Refills: 11 | Status: SHIPPED | OUTPATIENT
Start: 2025-08-14

## 2025-08-14 RX ORDER — ASPIRIN 81 MG/1
81 TABLET ORAL DAILY
Qty: 35 TABLET | Refills: 11 | Status: SHIPPED | OUTPATIENT
Start: 2025-08-14

## 2025-08-14 RX ORDER — SENNOSIDES 8.6 MG
2 TABLET ORAL DAILY
Qty: 56 TABLET | Refills: 11 | Status: SHIPPED | OUTPATIENT
Start: 2025-08-14

## 2025-08-19 ENCOUNTER — TELEPHONE (OUTPATIENT)
Dept: FAMILY MEDICINE | Facility: OTHER | Age: 80
End: 2025-08-19

## 2025-09-04 DIAGNOSIS — R46.89 COGNITIVE AND BEHAVIORAL CHANGES: ICD-10-CM

## 2025-09-04 DIAGNOSIS — R41.89 COGNITIVE AND BEHAVIORAL CHANGES: ICD-10-CM

## 2025-09-04 RX ORDER — CARVEDILOL 6.25 MG/1
TABLET ORAL
Qty: 28 TABLET | Refills: 0 | Status: SHIPPED | OUTPATIENT
Start: 2025-09-04

## (undated) DEVICE — PAD CHUX UNDERPAD 30X36" P3036C

## (undated) DEVICE — IRRIGATION-H2O 1000ML

## (undated) DEVICE — JELLY LUBRICATING SURGILUBE 2OZ TUBE

## (undated) DEVICE — IRRIGATION-NACL 1000ML

## (undated) DEVICE — GOWN-SURG XL LVL 3 REINFORCED

## (undated) DEVICE — GLOVE 8.5 PROTEXIS PI CLSC PF BD CUF STRL LF 12IN 2D72PL85X

## (undated) DEVICE — TRAY SKIN PREP POVIDONE/IODINE DYND70372

## (undated) DEVICE — DRAPE POUCH INSTRUMENT 1018

## (undated) DEVICE — SLEEVE SCD EXPRESS KNEE LENGTH MED 9529

## (undated) DEVICE — CAUTERY PAD-POLYHESIVE II ADULT

## (undated) DEVICE — RX BACITRACIN OINTMENT 14G 0.5OZ 45802-060-01

## (undated) DEVICE — SUCTION MANIFOLD NEPTUNE 2 SYS 1 PORT 702-025-000

## (undated) DEVICE — PREP POVIDONE IODINE SOLUTION 10% 4OZ BOTTLE 29906-004

## (undated) DEVICE — ESU LIGASURE DISSECTOR EXACT LF2019

## (undated) DEVICE — ESU GROUND PAD ADULT W/CORD E7507

## (undated) DEVICE — STRAP KNEE/BODY 31143004

## (undated) DEVICE — PACK LAPAROTOMY CUSTOM SBA32LPMBG

## (undated) DEVICE — LABEL STERILE PREPRINTED FOR OR FRRH01-2M

## (undated) DEVICE — PANTIES MESH LG/XLG 2PK 706M2

## (undated) DEVICE — SYR 20ML LL W/O NDL 302830

## (undated) DEVICE — PACK-GYN CYSTO-CUSTOM

## (undated) DEVICE — VESSEL LOOPS YELLOW MAXI 31145694

## (undated) DEVICE — Device

## (undated) DEVICE — CANISTER SUCTION MEDI-VAC GUARDIAN 2000ML 90D 65651-220

## (undated) DEVICE — TUBING SUCTION 20FT N620A

## (undated) DEVICE — COVER LT HANDLE 2/PK 5160-2FG

## (undated) DEVICE — PULSE LAVAGE IRRIGATION SYSTEM 0210-114-100

## (undated) DEVICE — SOL HYDROGEN PEROXIDE 3% 4OZ BOTTLE F0010

## (undated) DEVICE — PEN MARKING SKIN W/LABELS 31145918

## (undated) DEVICE — PAD ABD 10X8IN DERMACEA ABS NWVN 4 SL EDG SFT LF STRL 7198D

## (undated) DEVICE — PACK GYN CYSTO CUSTOM SMA32GCMBF

## (undated) DEVICE — SOL NACL 0.9% IRRIG 1000ML BOTTLE 2F7124

## (undated) DEVICE — DRSG KERLIX SUPER SPONGE 6X6.75" 2585

## (undated) DEVICE — LINEN TOWEL PACK X5 5464

## (undated) DEVICE — ESU PENCIL W/SMOKE EVAC CVPLP2000

## (undated) DEVICE — SOL WATER IRRIG 500ML BOTTLE 2F7113

## (undated) DEVICE — PACK RECTAL KIT CUSTOM ASC

## (undated) DEVICE — PANTIES-MESH L/XL

## (undated) DEVICE — SOL WATER IRRIG 1000ML BOTTLE 2F7114

## (undated) DEVICE — TAPE DURAPORE 3" SILK 1538-3

## (undated) DEVICE — PACK BASIN SET UP SUTCNBSBBA

## (undated) DEVICE — DRSG GAUZE 4X4" TRAY 6939

## (undated) DEVICE — GOWN SURG XL LVL 3 REINFORCED 9541

## (undated) DEVICE — LIGHT HANDLE COVER

## (undated) DEVICE — DRSG-ABDOMINAL 5 X 9

## (undated) DEVICE — CAUTERY PENCIL-SMOKE EVACUATION

## (undated) DEVICE — SU VICRYL 3-0 SH 27" UND J416H

## (undated) DEVICE — DRSG GAUZE 4X4" 3033

## (undated) DEVICE — DRSG STERI STRIP 1/2X4" R1547

## (undated) RX ORDER — PROPOFOL 10 MG/ML
INJECTION, EMULSION INTRAVENOUS
Status: DISPENSED
Start: 2023-10-14

## (undated) RX ORDER — DEXAMETHASONE SODIUM PHOSPHATE 10 MG/ML
INJECTION, SOLUTION INTRAMUSCULAR; INTRAVENOUS
Status: DISPENSED
Start: 2023-10-02

## (undated) RX ORDER — DEXMEDETOMIDINE HYDROCHLORIDE 100 UG/ML
INJECTION, SOLUTION INTRAVENOUS
Status: DISPENSED
Start: 2023-10-14

## (undated) RX ORDER — FENTANYL CITRATE-0.9 % NACL/PF 10 MCG/ML
PLASTIC BAG, INJECTION (ML) INTRAVENOUS
Status: DISPENSED
Start: 2023-10-02

## (undated) RX ORDER — PROPOFOL 10 MG/ML
INJECTION, EMULSION INTRAVENOUS
Status: DISPENSED
Start: 2024-06-18

## (undated) RX ORDER — ONDANSETRON 2 MG/ML
INJECTION INTRAMUSCULAR; INTRAVENOUS
Status: DISPENSED
Start: 2024-06-18

## (undated) RX ORDER — DEXAMETHASONE SODIUM PHOSPHATE 10 MG/ML
INJECTION, SOLUTION INTRAMUSCULAR; INTRAVENOUS
Status: DISPENSED
Start: 2024-05-06

## (undated) RX ORDER — ACETAMINOPHEN 325 MG/1
TABLET ORAL
Status: DISPENSED
Start: 2024-06-18

## (undated) RX ORDER — PROPOFOL 10 MG/ML
INJECTION, EMULSION INTRAVENOUS
Status: DISPENSED
Start: 2023-10-02

## (undated) RX ORDER — ONDANSETRON 2 MG/ML
INJECTION INTRAMUSCULAR; INTRAVENOUS
Status: DISPENSED
Start: 2024-05-06

## (undated) RX ORDER — FENTANYL CITRATE 50 UG/ML
INJECTION, SOLUTION INTRAMUSCULAR; INTRAVENOUS
Status: DISPENSED
Start: 2024-05-06

## (undated) RX ORDER — FENTANYL CITRATE 50 UG/ML
INJECTION, SOLUTION INTRAMUSCULAR; INTRAVENOUS
Status: DISPENSED
Start: 2023-10-02

## (undated) RX ORDER — GLYCOPYRROLATE 0.2 MG/ML
INJECTION, SOLUTION INTRAMUSCULAR; INTRAVENOUS
Status: DISPENSED
Start: 2023-10-14

## (undated) RX ORDER — FENTANYL CITRATE 50 UG/ML
INJECTION, SOLUTION INTRAMUSCULAR; INTRAVENOUS
Status: DISPENSED
Start: 2023-10-14

## (undated) RX ORDER — METRONIDAZOLE 500 MG/100ML
INJECTION, SOLUTION INTRAVENOUS
Status: DISPENSED
Start: 2024-06-18

## (undated) RX ORDER — EPHEDRINE SULFATE 50 MG/ML
INJECTION, SOLUTION INTRAMUSCULAR; INTRAVENOUS; SUBCUTANEOUS
Status: DISPENSED
Start: 2024-05-06

## (undated) RX ORDER — OXYCODONE HYDROCHLORIDE 5 MG/1
TABLET ORAL
Status: DISPENSED
Start: 2024-06-18

## (undated) RX ORDER — ONDANSETRON 2 MG/ML
INJECTION INTRAMUSCULAR; INTRAVENOUS
Status: DISPENSED
Start: 2023-10-02